# Patient Record
Sex: FEMALE | Race: WHITE | NOT HISPANIC OR LATINO | Employment: OTHER | URBAN - NONMETROPOLITAN AREA
[De-identification: names, ages, dates, MRNs, and addresses within clinical notes are randomized per-mention and may not be internally consistent; named-entity substitution may affect disease eponyms.]

---

## 2017-05-05 ENCOUNTER — ANTICOAGULATION VISIT (OUTPATIENT)
Dept: MEDICAL GROUP | Facility: PHYSICIAN GROUP | Age: 67
End: 2017-05-05
Payer: MEDICARE

## 2017-05-05 DIAGNOSIS — Z79.01 LONG TERM (CURRENT) USE OF ANTICOAGULANTS: ICD-10-CM

## 2017-05-05 PROBLEM — I63.9 STROKE (HCC): Status: ACTIVE | Noted: 2017-05-05

## 2017-05-05 PROBLEM — I48.91 ATRIAL FIBRILLATION (HCC): Status: ACTIVE | Noted: 2017-05-05

## 2017-05-05 LAB — INR PPP: 2.4 (ref 2–3.5)

## 2017-05-05 PROCEDURE — 85610 PROTHROMBIN TIME: CPT | Performed by: PHYSICIAN ASSISTANT

## 2017-05-05 NOTE — MR AVS SNAPSHOT
Tonya Del Valle   2017 9:45 AM   Anticoagulation Visit   MRN: [id number]    Department:  [facility]   Dept Phone:  [phone]    Description:  Female : 1950   Provider:  DANNA ANTI-COAG           Allergies as of 2017     Not on File      Basic Information     Date Of Birth Sex Race Ethnicity Preferred Language    1950 Female White Non- English      Your appointments     May 05, 2017  9:45 AM   Anti-Coag Routine with [place] ANTI-COAG   [facility] ([place])    [address]  [place] [id number] [phone]            2017  9:45 AM   Anti-Coag Routine with [place] ANTI-COAG   [facility] ([place])    [address]  [place] [address] [phone]              Problem List              ICD-10-CM Priority Class Noted - Resolved    Stroke (CMS-HCC) [I63.9] I63.9   2017 - Present      Health Maintenance     Patient has no pending health maintenance at this time      Current Immunizations     No immunizations on file.      Below and/or attached are the medications your provider expects you to take. Review all of your home medications and newly ordered medications with your provider and/or pharmacist. Follow medication instructions as directed by your provider and/or pharmacist. Please keep your medication list with you and share with your provider. Update the information when medications are discontinued, doses are changed, or new medications (including over-the-counter products) are added; and carry medication information at all times in the event of emergency situations     Allergies:  (Not on file)          Medications  Valid as of: May 05, 2017 -  9:43 AM    Generic Name Brand Name Tablet Size Instructions for use    .                 Medicines prescribed today were sent to:     None      Medication refill instructions:       If your prescription bottle indicates you have medication refills left,  it is not necessary to call your provider’s office. Please contact your pharmacy and they will refill your medication.    If your prescription bottle indicates you do not have any refills left, you may request refills at any time through one of the following ways: The online [employer] party supplies & rentals system (except Urgent Care), by calling your provider’s office, or by asking your pharmacy to contact your provider’s office with a refill request. Medication refills are processed only during regular business hours and may not be available until the next business day. Your provider may request additional information or to have a follow-up visit with you prior to refilling your medication.   *Please Note: Medication refills are assigned a new Rx number when refilled electronically. Your pharmacy may indicate that no refills were authorized even though a new prescription for the same medication is available at the pharmacy. Please request the medicine by name with the pharmacy before contacting your provider for a refill.           [employer] Status: Patient Declined

## 2017-05-05 NOTE — PROGRESS NOTES
Anticoagulation Summary as of 5/5/2017     INR goal 2.0-3.0   Selected INR 2.4 (5/5/2017)   Maintenance plan No maintenance plan   Next INR check 6/2/2017   Target end date Indefinite    Indications   Stroke (CMS-[facility]) [I63.9] [I63.9]  Atrial fibrillation (CMS-[facility]) [I48.91] [I48.91]  Long term (current) use of anticoagulants [Z79.01] [Z79.01]         Anticoagulation Episode Summary     INR check location     Preferred lab     Send INR reminders to     Comments INR to MD at [phone]        Anticoagulation Patient Findings      POC INR : 2.4      Plan: Patient is here for POC. She is being dosed by her MD in [place] where she resides. [facility]. Results will be faxed to [phone]    F/u 4 weeks     Jaspal Oh, WILLIED

## 2017-11-03 ENCOUNTER — ANTICOAGULATION VISIT (OUTPATIENT)
Dept: MEDICAL GROUP | Facility: PHYSICIAN GROUP | Age: 67
End: 2017-11-03
Payer: MEDICARE

## 2017-11-03 VITALS — HEART RATE: 80 BPM | DIASTOLIC BLOOD PRESSURE: 80 MMHG | SYSTOLIC BLOOD PRESSURE: 130 MMHG

## 2017-11-03 DIAGNOSIS — Z79.01 LONG TERM CURRENT USE OF ANTICOAGULANT THERAPY: ICD-10-CM

## 2017-11-03 LAB — INR PPP: 2.6 (ref 2–3.5)

## 2017-11-03 PROCEDURE — 85610 PROTHROMBIN TIME: CPT | Performed by: NURSE PRACTITIONER

## 2017-11-03 NOTE — PROGRESS NOTES
Anticoagulation Summary  As of 11/3/2017    INR goal:   2.0-3.0   TTR:   100.0 % (6.1 mo)   Today's INR:   2.6   Maintenance plan:   No maintenance plan   Next INR check:      Target end date:   Indefinite    Indications    Stroke (CMS-[facility]) [I63.9] [I63.9]  Atrial fibrillation (CMS-[facility]) [I48.91] [I48.91]  Long term (current) use of anticoagulants [Z79.01] [Z79.01]             Anticoagulation Episode Summary     INR check location:       Preferred lab:       Send INR reminders to:       Comments:   INR to MD at [phone]  Ghada CONTRERAS         Anticoagulation Patient Findings      HPI:  Tonya Del Valle seen in clinic today, on anticoagulation therapy with warfarin for stroke   Changes to current medical/health status since last appt: INR only and faxed to provider,   Denies signs/symptoms of bleeding and/or thrombosis since the last appt.    Denies any interval changes to diet  Denies any interval changes to medications since last appt.   Denies any complications or cost restrictions with current therapy.   BP recorded in vitals.    A/P   INR  therapeutic.     Follow up appointment prn      Jaspal Oh, PharmD

## 2018-03-30 ENCOUNTER — ANTICOAGULATION VISIT (OUTPATIENT)
Dept: MEDICAL GROUP | Facility: PHYSICIAN GROUP | Age: 68
End: 2018-03-30
Payer: MEDICARE

## 2018-03-30 ENCOUNTER — APPOINTMENT (OUTPATIENT)
Dept: VASCULAR LAB | Facility: MEDICAL CENTER | Age: 68
End: 2018-03-30
Attending: INTERNAL MEDICINE
Payer: MEDICARE

## 2018-03-30 DIAGNOSIS — Z79.01 LONG TERM CURRENT USE OF ANTICOAGULANT THERAPY: ICD-10-CM

## 2018-03-30 DIAGNOSIS — I48.91 ATRIAL FIBRILLATION, UNSPECIFIED TYPE (HCC): ICD-10-CM

## 2018-03-30 LAB — INR PPP: 3.1 (ref 2–3.5)

## 2018-03-30 PROCEDURE — 85610 PROTHROMBIN TIME: CPT | Performed by: NURSE PRACTITIONER

## 2018-03-30 NOTE — PROGRESS NOTES
Anticoagulation Summary  As of 3/30/2018    INR goal:   2.0-3.0   TTR:   --   Today's INR:   3.1!   Maintenance plan:   No maintenance plan   Next INR check:      Target end date:   Indefinite    Indications    Stroke (CMS-[facility]) [I63.9] [I63.9]  Atrial fibrillation (CMS-[facility]) [I48.91] [I48.91]  Long term (current) use of anticoagulants [Z79.01] [Z79.01]             Anticoagulation Episode Summary     INR check location:       Preferred lab:       Send INR reminders to:       Comments:   INR to MD at [phone]  Ghada CONTRERAS         Anticoagulation Patient Findings    INR results forwarded to provider in [place].    Juan R Mirza, PharmD

## 2018-04-27 ENCOUNTER — ANTICOAGULATION VISIT (OUTPATIENT)
Dept: MEDICAL GROUP | Facility: PHYSICIAN GROUP | Age: 68
End: 2018-04-27
Payer: MEDICARE

## 2018-04-27 DIAGNOSIS — Z79.01 LONG TERM CURRENT USE OF ANTICOAGULANT THERAPY: Primary | ICD-10-CM

## 2018-04-27 LAB — INR PPP: 2.4 (ref 2–3.5)

## 2018-04-27 PROCEDURE — 99999 PR NO CHARGE: CPT | Performed by: NURSE PRACTITIONER

## 2018-04-27 PROCEDURE — 85610 PROTHROMBIN TIME: CPT | Performed by: NURSE PRACTITIONER

## 2018-04-27 NOTE — PROGRESS NOTES
Anticoagulation Summary  As of 4/27/2018    INR goal:   2.0-3.0   TTR:   --   Today's INR:   2.4   Warfarin maintenance plan:   No maintenance plan   Next INR check:   5/25/2018   Target end date:   Indefinite    Indications    Stroke (CMS-[facility]) [I63.9] [I63.9]  Atrial fibrillation (CMS-[facility]) [I48.91] [I48.91]  Long term (current) use of anticoagulants [Z79.01] [Z79.01]             Anticoagulation Episode Summary     INR check location:       Preferred lab:       Send INR reminders to:       Comments:   INR to MD at [phone]  Ghada CONTRERAS         Anticoagulation Patient Findings    Pt came into clinic today for INR.   INR was 2.4  INR result was faxed to DIANE as in comments.       Emy Monroe, PharmD

## 2018-05-25 ENCOUNTER — ANTICOAGULATION VISIT (OUTPATIENT)
Dept: MEDICAL GROUP | Facility: PHYSICIAN GROUP | Age: 68
End: 2018-05-25
Payer: MEDICARE

## 2018-05-25 DIAGNOSIS — Z79.01 LONG TERM CURRENT USE OF ANTICOAGULANT THERAPY: ICD-10-CM

## 2018-05-25 LAB — INR PPP: 2.9 (ref 2–3.5)

## 2018-05-25 PROCEDURE — 85610 PROTHROMBIN TIME: CPT | Performed by: NURSE PRACTITIONER

## 2018-05-25 PROCEDURE — 99999 PR NO CHARGE: CPT | Performed by: NURSE PRACTITIONER

## 2018-05-25 NOTE — PROGRESS NOTES
OP Anticoagulation Service Note    Date: 5/25/2018  There were no vitals filed for this visit.    Anticoagulation Summary  As of 5/25/2018    INR goal:   2.0-3.0   TTR:   91.3 % (1.1 y)   Today's INR:   2.9   Warfarin maintenance plan:   No maintenance plan   Next INR check:      Target end date:   Indefinite    Indications    Stroke (CMS-HCC) [I63.9] [I63.9]  Atrial fibrillation (CMS-HCC) [I48.91] [I48.91]  Long term (current) use of anticoagulants [Z79.01] [Z79.01]             Anticoagulation Episode Summary     INR check location:       Preferred lab:       Send INR reminders to:       Comments:   INR to MD at [phone]  Ghada CONTRERAS         Anticoagulation Patient Findings      HPI:   Tonya Del Valle seen in clinic today, they are here today for a INR check on anticoagulation therapy with warfarin because they have a pmh of a stroke    The reason for today's visit is to prevent morbidity and mortality from a stroke  and to reduce the risk of bleeding while on a anticoagulant.     Reason for today's visit (per our collaborative practice policy) is because their last INR was 2.4  on  4/27.   Intervention at the last visit:  none    Any upcoming procedures:   none    Confirmed warfarin dosing regimen  Interval Changes with foods rich in vitamin K: No  Interval Changes in ETOH:   No  Interval Changes in smoking status:  No  Interval Changes in medication:  No   Cost restriction:  No    S/S of bleeding or bruising:  No  Signs/symptoms  thrombosis since the last appt:  No  Bleed risk is:  moderate,     3 vitals included with today's appt :No  (BP, HR, weight, ht, RR)     Assessment:   INR  therapeutic.      No change in dose needed today, they will need to continue with the same dose and diet to prevent adverse events while on a anticoagulant.     They have a TTR of 91.3  which is not at target (TTR target/goal is 100%) and requires close follow up to prevent a adverse event (the lower the TTR the higher risk of  clots, strokes, or bleeding).       Plan:  Dosing per PCP not us.       Follow up:  Follow up appointment in 4- 5 months per pt, they will call us when they get back into town       Other info:  Pt educated to contact our clinic with any changes in medications or s/s of bleeding or thrombosis    CHEST guidelines recommend frequent INR monitoring at regular intervals (a few days up to a max of 12 weeks) to ensure they are on the proper dose of warfarin and not having any complications from therapy.  INRs can dramatically change over a short time period due to diet, medications, and medical conditions.

## 2018-10-18 ENCOUNTER — ANTICOAGULATION MONITORING (OUTPATIENT)
Dept: MEDICAL GROUP | Facility: MEDICAL CENTER | Age: 68
End: 2018-10-18

## 2018-10-18 DIAGNOSIS — Z79.01 LONG TERM (CURRENT) USE OF ANTICOAGULANTS: ICD-10-CM

## 2018-10-18 DIAGNOSIS — I48.91 ATRIAL FIBRILLATION, UNSPECIFIED TYPE (HCC): ICD-10-CM

## 2018-10-18 NOTE — PROGRESS NOTES
Discharged from [facility].  Anticoagulation managed by MD in [facility], Clinical Pharmacist

## 2018-10-19 ENCOUNTER — HOSPITAL ENCOUNTER (OUTPATIENT)
Dept: LAB | Facility: MEDICAL CENTER | Age: 68
End: 2018-10-19
Payer: MEDICARE

## 2018-10-19 ENCOUNTER — ANTICOAGULATION VISIT (OUTPATIENT)
Dept: MEDICAL GROUP | Facility: PHYSICIAN GROUP | Age: 68
End: 2018-10-19
Payer: MEDICARE

## 2018-10-19 DIAGNOSIS — I48.91 ATRIAL FIBRILLATION, UNSPECIFIED TYPE (HCC): ICD-10-CM

## 2018-10-19 DIAGNOSIS — Z79.01 LONG TERM (CURRENT) USE OF ANTICOAGULANTS: ICD-10-CM

## 2018-10-19 LAB — INR PPP: 2.6 (ref 2–3.5)

## 2018-10-19 PROCEDURE — 85610 PROTHROMBIN TIME: CPT | Performed by: NURSE PRACTITIONER

## 2018-10-19 PROCEDURE — 99211 OFF/OP EST MAY X REQ PHY/QHP: CPT | Performed by: NURSE PRACTITIONER

## 2018-10-19 NOTE — PROGRESS NOTES
Anticoagulation Summary  As of 10/19/2018    INR goal:      TTR:   91.3 % (1.1 y)   Today's INR:   2.6   Warfarin maintenance plan:   No maintenance plan   Plan last modified:   Jaspal Oh, PharmD (10/19/2018)   Next INR check:      Target end date:       Indications    Atrial fibrillation (CMS-HCC) [I48.91] [I48.91]  Long term (current) use of anticoagulants [Z79.01] [Z79.01]  Stroke (CMS-HCC) [I63.9] [I63.9]             Anticoagulation Episode Summary     INR check location:       Preferred lab:       Send INR reminders to:       Comments:   INR to MD at [phone]      Anticoagulation Care Providers     Provider Role Specialty Phone number    [facility]   [phone]        Anticoagulation Patient Findings      Tonya is in clinic today for a point-of-care INR only.    Denies signs/symptoms of bleeding and/or thrombosis.   Denies changes to diet or medications.   Follow up appointment in 4 week(s).    Her INR will be sent to the fax number noted above.  Her primary care physician doses of her warfarin    Jaspal Oh, PharmD

## 2018-10-31 ENCOUNTER — ANTICOAGULATION MONITORING (OUTPATIENT)
Dept: VASCULAR LAB | Facility: MEDICAL CENTER | Age: 68
End: 2018-10-31

## 2018-10-31 DIAGNOSIS — I48.91 ATRIAL FIBRILLATION, UNSPECIFIED TYPE (HCC): ICD-10-CM

## 2018-10-31 DIAGNOSIS — Z79.01 LONG TERM (CURRENT) USE OF ANTICOAGULANTS: ICD-10-CM

## 2018-11-16 ENCOUNTER — ANTICOAGULATION VISIT (OUTPATIENT)
Dept: MEDICAL GROUP | Facility: PHYSICIAN GROUP | Age: 68
End: 2018-11-16
Payer: MEDICARE

## 2018-11-16 DIAGNOSIS — Z79.01 LONG TERM (CURRENT) USE OF ANTICOAGULANTS: ICD-10-CM

## 2018-11-16 LAB — INR PPP: 3.2 (ref 2–3.5)

## 2018-11-16 PROCEDURE — 85610 PROTHROMBIN TIME: CPT | Performed by: FAMILY MEDICINE

## 2018-11-16 PROCEDURE — 99211 OFF/OP EST MAY X REQ PHY/QHP: CPT | Performed by: FAMILY MEDICINE

## 2018-11-16 NOTE — PROGRESS NOTES
Anticoagulation Summary  As of 11/16/2018    INR goal:   2.0-3.0   TTR:   49.6 % (2.6 wk)   Today's INR:   3.2!   Warfarin maintenance plan:   No maintenance plan   Plan last modified:   Jaspal Oh, PharmD (10/19/2018)   Next INR check:   4/16/2019   Target end date:       Indications    Atrial fibrillation (CMS-HCC) [I48.91] [I48.91]  Long term (current) use of anticoagulants [Z79.01] [Z79.01]  Stroke (CMS-HCC) [I63.9] [I63.9]             Anticoagulation Episode Summary     INR check location:       Preferred lab:       Send INR reminders to:       Comments:   INR to MD at [phone]      Anticoagulation Care Providers     Provider Role Specialty Phone number    [facility]   [phone]        Anticoagulation Patient Findings      INR  supra-therapeutic.   They are here for INR only.  I will fax this note to their provider at the number noted above. They will be gone until April of next year.  I will not make any follow-up at this point.  They will call me once they get back into [place] to make an appointment with me.    Jaspal Oh, PharmD

## 2018-12-13 ENCOUNTER — ANTICOAGULATION MONITORING (OUTPATIENT)
Dept: VASCULAR LAB | Facility: MEDICAL CENTER | Age: 68
End: 2018-12-13

## 2018-12-13 DIAGNOSIS — I48.91 ATRIAL FIBRILLATION, UNSPECIFIED TYPE (HCC): ICD-10-CM

## 2018-12-13 DIAGNOSIS — Z79.01 LONG TERM (CURRENT) USE OF ANTICOAGULANTS: ICD-10-CM

## 2018-12-13 NOTE — PROGRESS NOTES
Discharged from [facility].  Anticoagulation managed by PCP in [facility], Clinical Pharmacist

## 2020-07-09 ENCOUNTER — TELEPHONE (OUTPATIENT)
Dept: VASCULAR LAB | Facility: MEDICAL CENTER | Age: 70
End: 2020-07-09

## 2020-07-09 ENCOUNTER — HOSPITAL ENCOUNTER (OUTPATIENT)
Dept: LAB | Facility: MEDICAL CENTER | Age: 70
End: 2020-07-09
Payer: MEDICARE

## 2020-07-09 ENCOUNTER — ANTICOAGULATION VISIT (OUTPATIENT)
Dept: MEDICAL GROUP | Facility: PHYSICIAN GROUP | Age: 70
End: 2020-07-09
Payer: MEDICARE

## 2020-07-09 ENCOUNTER — ANTICOAGULATION MONITORING (OUTPATIENT)
Dept: MEDICAL GROUP | Facility: PHYSICIAN GROUP | Age: 70
End: 2020-07-09
Payer: MEDICARE

## 2020-07-09 DIAGNOSIS — Z79.01 LONG TERM (CURRENT) USE OF ANTICOAGULANTS: ICD-10-CM

## 2020-07-09 DIAGNOSIS — I48.91 ATRIAL FIBRILLATION, UNSPECIFIED TYPE (HCC): ICD-10-CM

## 2020-07-09 LAB — INR PPP: 3.3 (ref 2–3.5)

## 2020-07-09 PROCEDURE — 85610 PROTHROMBIN TIME: CPT | Performed by: NURSE PRACTITIONER

## 2020-07-09 PROCEDURE — 99211 OFF/OP EST MAY X REQ PHY/QHP: CPT | Performed by: NURSE PRACTITIONER

## 2020-07-09 NOTE — PROGRESS NOTES
Date: 7/9/2020    Anticoagulation Summary  As of 7/9/2020    INR goal:      TTR:   --   INR used for dosing:   3.30 (7/9/2020)   Warfarin maintenance plan:   No maintenance plan   Next INR check:   8/6/2020   Target end date:       Indications    Atrial fibrillation (CMS-[facility]) [I48.91] [I48.91]  Long term (current) use of anticoagulants [Z79.01] [Z79.01]  Stroke (CMS-[facility]) [I63.9] [I63.9]             Anticoagulation Episode Summary     INR check location:       Preferred lab:       Send INR reminders to:       Comments:   INR to MD at [phone]        Anticoagulation Patient Findings      HPI:   Tonya Del Valle seen in clinic today, they are here today for a INR check on anticoagulation therapy     The reason for today's visit is to prevent morbidity and mortality from a blood clot or stroke and to reduce the risk of bleeding while on a anticoagulant.     Dose the patient in the medical group have a [facility] primary care provider and proper insurance?  Hyacinth Jones, A.P.R.N.  [address] [place] [address] [phone]      Additional education provided today regarding reducing bleed risk and dietary constraints:  About how vitamin K and foods work with warfarin and the bleeding risk on a anticoagulant     Any upcoming procedures:   none    Confirmed warfarin dosing regimen  Interval Changes with foods rich in vitamin K: No  Interval Changes in ETOH:   No  Interval Changes in smoking status:  No  Interval Changes in medication:  No   Cost restriction:  No  S/S of bleeding or bruising:  No  Signs/symptoms  thrombosis since the last appt:  No  Bleed risk is:  moderate,       Assessment:   INR  supra-therapeutic.     Medications reviewed and updated--    3 vitals included with today's appt :  (BP, HR, weight, ht, RR)   There were no vitals filed for this visit.      Plan:  INR to MD     Follow up:  Follow up appointment in 4 week(s)       Other info:  Pt educated to contact our clinic with any changes  in medications or s/s of bleeding or thrombosis    CHEST guidelines recommend frequent INR monitoring at regular intervals (a few days up to a max of 12 weeks) to ensure they are on the proper dose of warfarin and not having any complications from therapy.  INRs can dramatically change over a short time period due to diet, medications, and medical conditions.     Jaspal Oh, PharmD, MS, BCACP, C    This note was created using voice recognition software (Dragon). The accuracy of the dictation is limited by the abilities of the software. I have reviewed the note prior to signing, however some errors in grammar and context are still possible. If you have any questions related to this note please do not hesitate to contact our office.

## 2020-07-09 NOTE — PROGRESS NOTES
Please see prior note, she was booked in the wrong clinic.  She was booked in my clinic not the anticoagulant routine clinic.  By the time I had started the appointment I was unable to change the settings.

## 2020-07-16 ENCOUNTER — OFFICE VISIT (OUTPATIENT)
Dept: MEDICAL GROUP | Facility: PHYSICIAN GROUP | Age: 70
End: 2020-07-16
Payer: MEDICARE

## 2020-07-16 VITALS
SYSTOLIC BLOOD PRESSURE: 136 MMHG | HEIGHT: 66 IN | OXYGEN SATURATION: 96 % | DIASTOLIC BLOOD PRESSURE: 84 MMHG | RESPIRATION RATE: 16 BRPM | TEMPERATURE: 98.7 F | WEIGHT: 220 LBS | HEART RATE: 72 BPM | BODY MASS INDEX: 35.36 KG/M2

## 2020-07-16 DIAGNOSIS — Z12.11 SCREENING FOR COLON CANCER: ICD-10-CM

## 2020-07-16 DIAGNOSIS — I48.91 ATRIAL FIBRILLATION, UNSPECIFIED TYPE (HCC): ICD-10-CM

## 2020-07-16 DIAGNOSIS — I63.9 ISCHEMIC CEREBROVASCULAR ACCIDENT (CVA) (HCC): ICD-10-CM

## 2020-07-16 DIAGNOSIS — Z79.01 LONG TERM (CURRENT) USE OF ANTICOAGULANTS: ICD-10-CM

## 2020-07-16 DIAGNOSIS — N95.1 MENOPAUSAL STATE: ICD-10-CM

## 2020-07-16 DIAGNOSIS — Z23 NEED FOR VACCINATION: ICD-10-CM

## 2020-07-16 DIAGNOSIS — E78.2 MIXED HYPERLIPIDEMIA: ICD-10-CM

## 2020-07-16 DIAGNOSIS — Z12.31 ENCOUNTER FOR SCREENING MAMMOGRAM FOR BREAST CANCER: ICD-10-CM

## 2020-07-16 DIAGNOSIS — I10 ESSENTIAL HYPERTENSION: ICD-10-CM

## 2020-07-16 DIAGNOSIS — Z78.0 POSTMENOPAUSAL STATUS (AGE-RELATED) (NATURAL): ICD-10-CM

## 2020-07-16 PROBLEM — I48.0 PAROXYSMAL ATRIAL FIBRILLATION (HCC): Status: ACTIVE | Noted: 2017-05-05

## 2020-07-16 PROCEDURE — 99214 OFFICE O/P EST MOD 30 MIN: CPT | Performed by: NURSE PRACTITIONER

## 2020-07-16 RX ORDER — DILTIAZEM HYDROCHLORIDE 120 MG/1
120 TABLET, FILM COATED ORAL
COMMUNITY
Start: 2020-03-20 | End: 2020-10-15 | Stop reason: SDUPTHER

## 2020-07-16 RX ORDER — LOSARTAN POTASSIUM 100 MG/1
100 TABLET ORAL
COMMUNITY
Start: 2020-03-20 | End: 2020-07-16

## 2020-07-16 RX ORDER — DILTIAZEM HYDROCHLORIDE 120 MG/1
TABLET, FILM COATED ORAL
COMMUNITY
Start: 2020-06-17 | End: 2020-07-16

## 2020-07-16 RX ORDER — SIMVASTATIN 10 MG
10 TABLET ORAL
COMMUNITY
Start: 2020-03-20 | End: 2020-09-16

## 2020-07-16 RX ORDER — SIMVASTATIN 10 MG
TABLET ORAL
COMMUNITY
Start: 2020-06-17 | End: 2020-07-16

## 2020-07-16 RX ORDER — WARFARIN SODIUM 5 MG/1
TABLET ORAL
COMMUNITY
Start: 2020-03-20 | End: 2020-10-15 | Stop reason: SDUPTHER

## 2020-07-16 RX ORDER — LOSARTAN POTASSIUM 100 MG/1
TABLET ORAL
COMMUNITY
Start: 2020-06-17 | End: 2020-08-16 | Stop reason: SDUPTHER

## 2020-07-16 RX ORDER — CARVEDILOL 25 MG/1
50 TABLET ORAL
COMMUNITY
Start: 2020-05-08 | End: 2020-07-16

## 2020-07-16 RX ORDER — WARFARIN SODIUM 5 MG/1
TABLET ORAL
COMMUNITY
Start: 2020-06-17 | End: 2020-07-16

## 2020-07-16 RX ORDER — CHLORTHALIDONE 25 MG/1
TABLET ORAL
COMMUNITY
Start: 2020-03-20 | End: 2020-10-13 | Stop reason: SDUPTHER

## 2020-07-16 RX ORDER — CLONIDINE HYDROCHLORIDE 0.2 MG/1
TABLET ORAL
COMMUNITY
Start: 2020-05-08 | End: 2020-08-16 | Stop reason: SDUPTHER

## 2020-07-16 RX ORDER — CARVEDILOL 25 MG/1
25 TABLET ORAL 4 TIMES DAILY
COMMUNITY
Start: 2020-06-17 | End: 2020-12-17 | Stop reason: SDUPTHER

## 2020-07-16 RX ORDER — CIPROFLOXACIN 500 MG/1
TABLET, FILM COATED ORAL
COMMUNITY
Start: 2020-05-14 | End: 2020-07-16

## 2020-07-16 RX ORDER — CLONIDINE HYDROCHLORIDE 0.2 MG/1
0.2 TABLET ORAL
COMMUNITY
Start: 2020-05-08 | End: 2020-07-16

## 2020-07-16 RX ORDER — CHLORTHALIDONE 25 MG/1
TABLET ORAL
COMMUNITY
Start: 2020-06-17 | End: 2020-07-16

## 2020-07-16 ASSESSMENT — PATIENT HEALTH QUESTIONNAIRE - PHQ9: CLINICAL INTERPRETATION OF PHQ2 SCORE: 0

## 2020-07-16 NOTE — ASSESSMENT & PLAN NOTE
Chronic and stable  Blood pressure today within normal limits and denies symptoms of hypertension  Does not need refills on medications at this time  Will be due for labs in 6 months, these have been ordered

## 2020-07-16 NOTE — ASSESSMENT & PLAN NOTE
Occurred 11 years ago  Requesting outside record  Still has issues with R leg, ambulates with FWW  No longer followed by neurology  Reports right leg weakness is stable and unchanged  States she will think about physical therapy referral, perhaps sometime in the future but feels this does not need to be addressed at this time

## 2020-07-16 NOTE — LETTER
Critical access hospital  NATALI PadillaRLILLIAN  [address] [place] [address]  Fax: [phone]   Authorization for Release/Disclosure of   Protected Health Information   Name: ANTONIETTA CHRISTENSEN : 1950 SSN: [id number]   Address: [address] [place] [address] Phone:    [phone] (home)    I authorize the entity listed below to release/disclose the PHI below to:   Critical access hospital/MILO Padilla and MILO Padilla   Provider or Entity Name:  [facility]    Address   City, [facility], Zip  [address] [place] [address] Phone:  [phone]    Fax:  [phone]   Reason for request: continuity of care   Information to be released:    [  ] LAST COLONOSCOPY,  including any PATH REPORT and follow-up  [  ] LAST FIT/COLOGUARD RESULT [  ] LAST DEXA  [  ] LAST MAMMOGRAM  [  ] LAST PAP  [  ] LAST LABS [  ] RETINA EXAM REPORT  [  ] IMMUNIZATION RECORDS  [x  ] Release all info      [  ] Check here and initial the line next to each item to release ALL health information INCLUDING  _____ Care and treatment for drug and / or alcohol abuse  _____ HIV testing, infection status, or AIDS  _____ Genetic Testing    DATES OF SERVICE OR TIME PERIOD TO BE DISCLOSED: _____________  I understand and acknowledge that:  * This Authorization may be revoked at any time by you in writing, except if your health information has already been used or disclosed.  * Your health information that will be used or disclosed as a result of you signing this authorization could be re-disclosed by the recipient. If this occurs, your re-disclosed health information may no longer be protected by State or Federal laws.  * You may refuse to sign this Authorization. Your refusal will not affect your ability to obtain treatment.  * This Authorization becomes effective upon signing and will  on (date) __________.      If no date is indicated, this Authorization will  one (1) year from the  signature date.    Name: Tonya Del Valle    Signature:   Date:     7/16/2020       PLEASE FAX REQUESTED RECORDS BACK TO: [phone]

## 2020-07-16 NOTE — PROGRESS NOTES
Chief Complaint   Patient presents with   • Establish Care   • Referral Needed     coumadin clinic          This is a 69 y.o.female patient that presents today with the following: Establish care    Ischemic cerebrovascular accident (CVA) (HCC)  Occurred 11 years ago  Requesting outside record  Still has issues with R leg, ambulates with FWW  No longer followed by neurology  Reports right leg weakness is stable and unchanged  States she will think about physical therapy referral, perhaps sometime in the future but feels this does not need to be addressed at this time    Paroxysmal atrial fibrillation (HCC)  Very pleasant 69-year-old female patient presents today to establish care with new PCP  We will request all outside records  She has paroxysmal atrial fibrillation which resulted in a stroke (see additional notes).  She is on beta-blocker, calcium channel blocker, chlorthalidone and losartan  She does need a referral for a new cardiologist, referral placed  Is asymptomatic of this  Followed by the anticoagulation clinic and on Coumadin    Essential hypertension  Chronic and stable  Blood pressure today within normal limits and denies symptoms of hypertension  Does not need refills on medications at this time  Will be due for labs in 6 months, these have been ordered    Long term (current) use of anticoagulants [Z79.01]  See additional notes    Mixed hyperlipidemia  Chronic and stable  On simvastatin 10 mg daily  She will be due for labs in 6 months  We will request records from outside provider  Discussed the importance of ongoing lifestyle modifications      Anticoagulation Monitoring on 07/09/2020   Component Date Value   • INR 07/09/2020 3.30          clinical course has been stable    No past medical history on file.    No past surgical history on file.    No family history on file.    Atorvastatin and Sulfa drugs    Current Outpatient Medications Ordered in Epic   Medication Sig Dispense Refill   • carvedilol  "(COREG) 25 MG Tab      • chlorthalidone (HYGROTON) 25 MG Tab   See Instructions, TAKE 1 TABLET EVERY MORNING, # 90 tab, 1 Refill(s), Pharmacy: [facility], 165.1, cm, 10/30/19 8:35:00 CDT, Height, 97, kg, 07/09/19 23:54:00 CDT, Weight     • cloNIDine (CATAPRESS) 0.2 MG Tab      • DILTIAZem (CARDIZEM) 120 MG Tab 120 mg.     • warfarin (COUMADIN) 5 MG Tab   See Instructions, 10mg daily, # 180 tab, 1 Refill(s), Pharmacy: [facility], 165.1, cm, 10/30/19 8:35:00 CDT, Height, 97, kg, 07/09/19 23:54:00 CDT, Weight     • losartan (COZAAR) 100 MG Tab      • simvastatin (ZOCOR) 10 MG Tab 10 mg.     • Zoster Vac Recomb Adjuvanted (SHINGRIX) 50 MCG/0.5ML Recon Susp 0.5 mL by Intramuscular route Once for 1 dose. 0.5 mL 0     No current Epic-ordered facility-administered medications on file.        Constitutional ROS: No unexpected change in weight, No weakness, No unexplained fevers, sweats, or chills  Pulmonary ROS: No chronic cough, sputum, or hemoptysis, No shortness of breath, No recent change in breathing  Cardiovascular ROS: Positive per HPI  Gastrointestinal ROS: No abdominal pain, No nausea, vomiting, diarrhea, or constipation  Musculoskeletal/Extremities ROS: Positive for right lower extremity weakness  Neurologic ROS: Normal development, No seizures, No weakness, Positive for past stroke    Physical exam:  /84   Pulse 72   Temp 37.1 °C (98.7 °F) (Temporal)   Resp 16   Ht 1.664 m (5' 5.5\")   Wt 99.8 kg (220 lb)   SpO2 96%   BMI 36.05 kg/m²   General Appearance: Very pleasant elderly female, alert, no distress, obese, well-groomed  Skin: Skin color, texture, turgor normal. No rashes or lesions.  Neck: negative findings: no asymmetry, masses, or scars, no adenopathy, trachea midline and normal to palpitation  Lungs: negative findings: normal respiratory rate and rhythm, lungs clear to auscultation  Heart: positive findings: irregular rhythm  Abdomen: Abdomen soft, " non-tender. BS normal. No masses,  No organomegaly  Musculoskeletal: Positive for impaired gait, ambulating with front wheeled walker secondary to right lower extremity weakness  Peripheral Pulses: Normal  Neurologic: intact, CN II through XII grossly intact    Medical decision making/discussion:     Referral to cardiology    Will obtain outside records    Continue care per Coumadin  Clinic    Mammogram, DEXA scan and cologuard    Follow up 6 months with labs    Tonya was seen today for establish care and referral needed.    Diagnoses and all orders for this visit:    Long term (current) use of anticoagulants [Z79.01]    Atrial fibrillation, unspecified type (HCC)  -     REFERRAL TO CARDIOLOGY Cardiology    Encounter for screening mammogram for breast cancer  -     MA-SCREENING MAMMO BILAT W/CAD; Future    Need for vaccination  -     Zoster Vac Recomb Adjuvanted (SHINGRIX) 50 MCG/0.5ML Recon Susp; 0.5 mL by Intramuscular route Once for 1 dose.    Postmenopausal status (age-related) (natural)  -     DS-BONE DENSITY STUDY (DEXA)    Menopausal state  -     DS-BONE DENSITY STUDY (DEXA)    Screening for colon cancer  -     Cologuard Colon Cancer Screening    Ischemic cerebrovascular accident (CVA) (HCC)    Essential hypertension  -     CBC WITH DIFFERENTIAL; Future  -     Comp Metabolic Panel; Future  -     Lipid Profile; Future    Mixed hyperlipidemia  -     Comp Metabolic Panel; Future  -     Lipid Profile; Future        Return in about 6 months (around 1/16/2021) for Discuss Labs, Follow-up.        Please note that this dictation was created using voice recognition software. I have made every reasonable attempt to correct obvious errors, but I expect that there are errors of grammar and possibly content that I did not discover before finalizing the note.

## 2020-07-16 NOTE — ASSESSMENT & PLAN NOTE
Chronic and stable  On simvastatin 10 mg daily  She will be due for labs in 6 months  We will request records from outside provider  Discussed the importance of ongoing lifestyle modifications

## 2020-07-16 NOTE — PATIENT INSTRUCTIONS
Referral to cardiology    Will obtain outside records    Continue care per Coumadin  Clinic    Mammogram, DEXA scan and cologuard    Follow up 6 months with labs      MyPlate from USDA    MyPlate is an outline of a general healthy diet based on the 2010 Dietary Guidelines for Americans, from the U.S. Department of Agriculture (USDA). It sets guidelines for how much food you should eat from each food group based on your age, sex, and level of physical activity.  What are tips for following MyPlate?  To follow MyPlate recommendations:  · Eat a wide variety of fruits and vegetables, grains, and protein foods.  · Serve smaller portions and eat less food throughout the day.  · Limit portion sizes to avoid overeating.  · Enjoy your food.  · Get at least 150 minutes of exercise every week. This is about 30 minutes each day, 5 or more days per week.  It can be difficult to have every meal look like MyPlate. Think about MyPlate as eating guidelines for an entire day, rather than each individual meal.  Fruits and vegetables  · Make half of your plate fruits and vegetables.  · Eat many different colors of fruits and vegetables each day.  · For a 2,000 calorie daily food plan, eat:  ? 2½ cups of vegetables every day.  ? 2 cups of fruit every day.  · 1 cup is equal to:  ? 1 cup raw or cooked vegetables.  ? 1 cup raw fruit.  ? 1 medium-sized orange, apple, or banana.  ? 1 cup 100% fruit or vegetable juice.  ? 2 cups raw leafy greens, such as lettuce, spinach, or kale.  ? ½ cup dried fruit.  Grains  · One fourth of your plate should be grains.  · Make at least half of the grains you eat each day whole grains.  · For a 2,000 calorie daily food plan, eat 6 oz of grains every day.  · 1 oz is equal to:  ? 1 slice bread.  ? 1 cup cereal.  ? ½ cup cooked rice, cereal, or pasta.  Protein  · One fourth of your plate should be protein.  · Eat a wide variety of protein foods, including meat, poultry, fish, eggs, beans, nuts, and  tofu.  · For a 2,000 calorie daily food plan, eat 5½ oz of protein every day.  · 1 oz is equal to:  ? 1 oz meat, poultry, or fish.  ? ¼ cup cooked beans.  ? 1 egg.  ? ½ oz nuts or seeds.  ? 1 Tbsp peanut butter.  Dairy  · Drink fat-free or low-fat (1%) milk.  · Eat or drink dairy as a side to meals.  · For a 2,000 calorie daily food plan, eat or drink 3 cups of dairy every day.  · 1 cup is equal to:  ? 1 cup milk, yogurt, cottage cheese, or soy milk (soy beverage).  ? 2 oz processed cheese.  ? 1½ oz natural cheese.  Fats, oils, salt, and sugars  · Only small amounts of oils are recommended.  · Avoid foods that are high in calories and low in nutritional value (empty calories), like foods high in fat or added sugars.  · Choose foods that are low in salt (sodium). Choose foods that have less than 140 milligrams (mg) of sodium per serving.  · Drink water instead of sugary drinks. Drink enough water each day to keep your urine pale yellow.  Where to find support  · Work with your health care provider or a nutrition specialist (dietitian) to develop a customized eating plan that is right for you.  · Download an krishna (mobile application) to help you track your daily food intake.  Where to find more information  · Go to ChooseMyPlate.gov for more information.  · Learn more and log your daily food intake according to MyPlate using USDA's SuperTracker: www.supertracker.usda.gov  Summary  · MyPlate is a general guideline for healthy eating from the USDA. It is based on the 2010 Dietary Guidelines for Americans.  · In general, fruits and vegetables should take up ½ of your plate, grains should take up ¼ of your plate, and protein should take up ¼ of your plate.  This information is not intended to replace advice given to you by your health care provider. Make sure you discuss any questions you have with your health care provider.  Document Released: 01/06/2009 Document Revised: 01/19/2019 Document Reviewed: 03/19/2018  [place]  Patient Education © 2020 Elsevier Inc.

## 2020-07-16 NOTE — ASSESSMENT & PLAN NOTE
Very pleasant 69-year-old female patient presents today to establish care with new PCP  We will request all outside records  She has paroxysmal atrial fibrillation which resulted in a stroke (see additional notes).  She is on beta-blocker, calcium channel blocker, chlorthalidone and losartan  She does need a referral for a new cardiologist, referral placed  Is asymptomatic of this  Followed by the anticoagulation clinic and on Coumadin

## 2020-07-21 DIAGNOSIS — I48.0 PAROXYSMAL ATRIAL FIBRILLATION (HCC): ICD-10-CM

## 2020-07-28 ENCOUNTER — ANTICOAGULATION MONITORING (OUTPATIENT)
Dept: VASCULAR LAB | Facility: MEDICAL CENTER | Age: 70
End: 2020-07-28

## 2020-07-28 DIAGNOSIS — I63.9 ISCHEMIC CEREBROVASCULAR ACCIDENT (CVA) (HCC): ICD-10-CM

## 2020-07-28 DIAGNOSIS — I48.0 PAROXYSMAL ATRIAL FIBRILLATION (HCC): ICD-10-CM

## 2020-07-28 DIAGNOSIS — Z79.01 LONG TERM (CURRENT) USE OF ANTICOAGULANTS: ICD-10-CM

## 2020-07-28 LAB
INR PPP: 3.7 (ref 2–3.5)
INR PPP: 3.7 (ref 2–3.5)

## 2020-08-07 ENCOUNTER — ANTICOAGULATION MONITORING (OUTPATIENT)
Dept: MEDICAL GROUP | Facility: PHYSICIAN GROUP | Age: 70
End: 2020-08-07

## 2020-08-07 ENCOUNTER — APPOINTMENT (OUTPATIENT)
Dept: MEDICAL GROUP | Facility: PHYSICIAN GROUP | Age: 70
End: 2020-08-07
Payer: MEDICARE

## 2020-08-07 DIAGNOSIS — I63.9 ISCHEMIC CEREBROVASCULAR ACCIDENT (CVA) (HCC): ICD-10-CM

## 2020-08-07 DIAGNOSIS — Z79.01 LONG TERM (CURRENT) USE OF ANTICOAGULANTS: ICD-10-CM

## 2020-08-07 DIAGNOSIS — I48.0 PAROXYSMAL ATRIAL FIBRILLATION (HCC): ICD-10-CM

## 2020-08-07 NOTE — PROGRESS NOTES
Anticoagulation Summary  As of 8/7/2020    INR goal:  2.0-3.0   TTR:  0.0 % (1.3 wk)   INR used for dosing:  3.70 (7/28/2020)   Warfarin maintenance plan:  15 mg (10 mg x 1.5) every Mon, Fri; 10 mg (10 mg x 1) all other days   Weekly warfarin total:  80 mg   Plan last modified:  Jaspal Oh PharmD (8/7/2020)   Next INR check:  8/10/2020   Target end date:      Indications    Paroxysmal atrial fibrillation (HCC) [I48.0]  Long term (current) use of anticoagulants [Z79.01] [Z79.01]  Ischemic cerebrovascular accident (CVA) (HCC) [I63.9]             Anticoagulation Episode Summary     INR check location:  Anticoagulation Clinic    Preferred lab:      Send INR reminders to:      Comments:        Anticoagulation Care Providers     Provider Role Specialty Phone number    MILO Padilla  [facility] [phone]        Anticoagulation Patient Findings    Spoke to patient on the phone.   INR  supra-therapeutic but INR was from 7/28  Denies signs/symptoms of bleeding and/or thrombosis.   Denies changes to diet or medications.   Follow up appointment in 3 days ).    Continue weekly warfarin dose as noted per pt       Jaspal Oh, Jonel, MS, BCACP, LCC    This note was created using voice recognition software (Dragon). The accuracy of the dictation is limited by the abilities of the software. I have reviewed the note prior to signing, however some errors in grammar and context are still possible. If you have any questions related to this note please do not hesitate to contact our office.

## 2020-08-10 ENCOUNTER — ANTICOAGULATION MONITORING (OUTPATIENT)
Dept: MEDICAL GROUP | Facility: PHYSICIAN GROUP | Age: 70
End: 2020-08-10

## 2020-08-10 DIAGNOSIS — I48.0 PAROXYSMAL ATRIAL FIBRILLATION (HCC): ICD-10-CM

## 2020-08-10 DIAGNOSIS — Z79.01 LONG TERM (CURRENT) USE OF ANTICOAGULANTS: ICD-10-CM

## 2020-08-10 DIAGNOSIS — I63.9 ISCHEMIC CEREBROVASCULAR ACCIDENT (CVA) (HCC): ICD-10-CM

## 2020-08-10 LAB — INR PPP: 3.3 (ref 2–3.5)

## 2020-08-10 NOTE — PROGRESS NOTES
Anticoagulation Summary  As of 8/10/2020    INR goal:  2.0-3.0   TTR:  0.0 % (3.1 wk)   INR used for dosing:  3.30 (8/10/2020)   Warfarin maintenance plan:  10 mg (5 mg x 2) every day   Weekly warfarin total:  70 mg   Plan last modified:  Jaspal Oh PharmD (8/10/2020)   Next INR check:  8/17/2020   Target end date:      Indications    Paroxysmal atrial fibrillation (HCC) [I48.0]  Long term (current) use of anticoagulants [Z79.01] [Z79.01]  Ischemic cerebrovascular accident (CVA) (HCC) [I63.9]             Anticoagulation Episode Summary     INR check location:  Anticoagulation Clinic    Preferred lab:      Send INR reminders to:      Comments:        Anticoagulation Care Providers     Provider Role Specialty Phone number    JO ANN Padilla.  [facility] [phone]        Anticoagulation Patient Findings      Spoke to patient on the phone.   INR  supra-therapeutic.   Denies signs/symptoms of bleeding and/or thrombosis.   Denies changes to diet or medications.   Follow up appointment in 1 week(s).    Decrease weekly warfarin dose as noted      Jaspal hO, PharmD, MS, BCACP, LCC    This note was created using voice recognition software (Dragon). The accuracy of the dictation is limited by the abilities of the software. I have reviewed the note prior to signing, however some errors in grammar and context are still possible. If you have any questions related to this note please do not hesitate to contact our office.

## 2020-08-15 ENCOUNTER — TELEPHONE (OUTPATIENT)
Dept: URGENT CARE | Facility: PHYSICIAN GROUP | Age: 70
End: 2020-08-15

## 2020-08-15 NOTE — TELEPHONE ENCOUNTER
1. Caller Name: Tonya Del Valle                       Call Back Number: [phone]      How would the patient prefer to be contacted with a response: Phone call OK to leave a detailed message    Pt is completely out of   losartan (COZAAR) 100 MG Tab and cloNIDine (CATAPRESS) 0.2 MG Tab would like refill to be sent to [facility] [place] just for this time since she is completely out.

## 2020-08-16 RX ORDER — CLONIDINE HYDROCHLORIDE 0.2 MG/1
0.2 TABLET ORAL 2 TIMES DAILY
Qty: 60 TAB | Refills: 0 | Status: SHIPPED | OUTPATIENT
Start: 2020-08-16 | End: 2020-10-13 | Stop reason: SDUPTHER

## 2020-08-16 RX ORDER — LOSARTAN POTASSIUM 100 MG/1
100 TABLET ORAL DAILY
Qty: 30 TAB | Refills: 0 | Status: SHIPPED | OUTPATIENT
Start: 2020-08-16 | End: 2020-10-15 | Stop reason: SDUPTHER

## 2020-08-17 ENCOUNTER — ANTICOAGULATION MONITORING (OUTPATIENT)
Dept: VASCULAR LAB | Facility: MEDICAL CENTER | Age: 70
End: 2020-08-17

## 2020-08-17 DIAGNOSIS — I63.9 ISCHEMIC CEREBROVASCULAR ACCIDENT (CVA) (HCC): ICD-10-CM

## 2020-08-17 DIAGNOSIS — I48.0 PAROXYSMAL ATRIAL FIBRILLATION (HCC): ICD-10-CM

## 2020-08-17 DIAGNOSIS — Z79.01 LONG TERM (CURRENT) USE OF ANTICOAGULANTS: ICD-10-CM

## 2020-08-17 LAB — INR PPP: 2.8 (ref 2–3.5)

## 2020-08-17 NOTE — PROGRESS NOTES
OP   Telephone Anticoagulation Service Note      Anticoagulation Summary  As of 2020    INR goal:  2.0-3.0   TTR:  9.7 % (4.1 wk)   INR used for dosin.80 (2020)   Warfarin maintenance plan:  10 mg (5 mg x 2) every day   Weekly warfarin total:  70 mg   Plan last modified:  Jaspal Oh PharmD (8/10/2020)   Next INR check:  2020   Target end date:      Indications    Paroxysmal atrial fibrillation (HCC) [I48.0]  Long term (current) use of anticoagulants [Z79.01] [Z79.01]  Ischemic cerebrovascular accident (CVA) (HCC) [I63.9]             Anticoagulation Episode Summary     INR check location:  Anticoagulation Clinic    Preferred lab:      Send INR reminders to:      Comments:        Anticoagulation Care Providers     Provider Role Specialty Phone number    MILO Padilla  [facility] [phone]        Anticoagulation Patient Findings  Patient Findings     Negatives:  Signs/symptoms of thrombosis, Signs/symptoms of bleeding, Laboratory test error suspected, Change in health, Change in alcohol use, Change in activity, Upcoming invasive procedure, Emergency department visit, Upcoming dental procedure, Missed doses, Extra doses, Change in medications, Change in diet/appetite, Hospital admission, Bruising, Other complaints        Spoke with the patient on the phone today, reporting a therapeutic INR of 2.8. Confirmed the current warfarin dosing regimen and patient compliance. Patient denies any interval changes to diet and/or medications. Patient denies any signs/symptoms of bleeding or clotting.  Patient instructed to continue with the current warfarin dosing regimen, and asked to follow up again in 2 weeks with new home monitor.     Lei Callahan PharmD

## 2020-08-18 ENCOUNTER — TELEPHONE (OUTPATIENT)
Dept: MEDICAL GROUP | Facility: PHYSICIAN GROUP | Age: 70
End: 2020-08-18

## 2020-08-18 NOTE — TELEPHONE ENCOUNTER
Patient has an appointment with cardiology tomorrow. I left a VM on her 's cell phone as home phone not working, reminding of the cardiology appointment and to ask for all medication refills. If cardiologist oks, we can continue refills at [facility] in future.   Aj Mejias M.D.

## 2020-08-19 ENCOUNTER — TELEMEDICINE2 (OUTPATIENT)
Dept: CARDIOLOGY | Facility: MEDICAL CENTER | Age: 70
End: 2020-08-19
Payer: MEDICARE

## 2020-08-19 ENCOUNTER — TELEMEDICINE ORIGINATING SITE VISIT (OUTPATIENT)
Dept: MEDICAL GROUP | Facility: PHYSICIAN GROUP | Age: 70
End: 2020-08-19
Payer: MEDICARE

## 2020-08-19 VITALS
SYSTOLIC BLOOD PRESSURE: 138 MMHG | DIASTOLIC BLOOD PRESSURE: 72 MMHG | HEART RATE: 75 BPM | WEIGHT: 218 LBS | TEMPERATURE: 98.6 F | BODY MASS INDEX: 36.32 KG/M2 | OXYGEN SATURATION: 92 % | RESPIRATION RATE: 16 BRPM | HEIGHT: 65 IN

## 2020-08-19 DIAGNOSIS — I48.0 PAROXYSMAL ATRIAL FIBRILLATION (HCC): ICD-10-CM

## 2020-08-19 DIAGNOSIS — E66.9 OBESITY (BMI 30-39.9): Chronic | ICD-10-CM

## 2020-08-19 DIAGNOSIS — Z79.01 LONG TERM (CURRENT) USE OF ANTICOAGULANTS: ICD-10-CM

## 2020-08-19 DIAGNOSIS — Z92.89 HISTORY OF ECHOCARDIOGRAM: Chronic | ICD-10-CM

## 2020-08-19 DIAGNOSIS — E78.2 MIXED HYPERLIPIDEMIA: ICD-10-CM

## 2020-08-19 DIAGNOSIS — I10 ESSENTIAL HYPERTENSION: ICD-10-CM

## 2020-08-19 PROBLEM — I63.9 ISCHEMIC CEREBROVASCULAR ACCIDENT (CVA) (HCC): Chronic | Status: ACTIVE | Noted: 2017-05-05

## 2020-08-19 PROCEDURE — 93000 ELECTROCARDIOGRAM COMPLETE: CPT | Performed by: FAMILY MEDICINE

## 2020-08-19 PROCEDURE — 99204 OFFICE O/P NEW MOD 45 MIN: CPT | Mod: 95,CR | Performed by: INTERNAL MEDICINE

## 2020-08-19 NOTE — PATIENT INSTRUCTIONS
Work on at least 1.5 hours a week of moderate exercise (typical brisk walking or similar activity)    Please look into the following diets and incorporate them into your diet    LOW SALT DIET   KEEP YOUR SODIUM EQUAL TO CALORIES AND NO MORE THAN DOUBLE THE CALORIES FOR A LOW SALT DIET    Cardiosmart.org - great resource for American College of Cardiology on heart disease prevention and treatment      FOR TREATMENT OR PREVENTION OF CORONARY ARTERY DISEASE  These three programs are approved by Medicare/Insurers for those with heart disease  [facility]  Dr. Dud[facility] for Reversing Heart Disease - [facility]'s Cardiologist vegetarian-based  [facility] - [place]-based mind-body Program    This is a commonly referenced Program  Dr Hairston - [place] [facility] [place] (book and documentary) - vegetarian-based      FOR TREATMENT OF BLOOD PRESSURE  DASH DIET - American Heart Association for treatment of HYPERTENSION    FOR TREATMENT OF BAD CHOLESTEROL/FATS  REDUCE PROCESSED SUGAR AS MUCH AS POSSIBLE  INCREASE WHOLE GRAINS/VEGETABLES    Lowering total cholesterol and LDL (bad) cholesterol:  - Eat leaner cuts of meat, or eliminate altogether if possible red meat, and frequently substitute fish or chicken.  - Limit saturated fat to no more than 7-10% of total calories no more than 10 g per day is recommended. Some sources of saturated fat include butter, animal fats, hydrogenated vegetable fats and oils, many desserts, whole milk dairy products.  - Replaced saturated fats with polyunsaturated fats and monounsaturated fats. Foods high in monounsaturated fat include nuts, although well, canola oil, avocados, and olives.  - Limit trans fat (processed foods) and replaced with fresh fruits and vegetables  - Recommend nonfat dairy products  - Increase substantially the amount of soluble fiber intake (legumes such as beans, fruit, whole grains).  - Consider  nutritional supplements: plant sterile spreads such as Benecol, fish oil,  flaxseed oil, omega-3 acids capsules 1000 mg twice a day, or viscous fiber such as Metamucil  - Attain ideal weight and regular exercise (at least 30 minutes per day of walking)    Lowering triglycerides:  - Reduce intake of simple sugar: Desserts, candy, pastries, honey, sodas, sugared cereals, yogurt, Gatorade, sports bars, canned fruit, smoothies, fruit juice, coffee drinks  - Reduced intake of refined starches: Refined Pasta  - Reduce or abstain from alcohol  - Increase omega-3 fatty acids: [place], Trout, Mackerel, Herring, Albacore tuna and supplements  - Attain ideal weight and regular exercise (at least 30 minutes per day of walking)      Elevating HDL (good) cholesterol:  - Increase physical activity  - Seasoned foods with garlic and onions  - Increase omega-3 fatty acids and supplements as listed above  - Incorporating appropriate amounts of monounsaturated fats such as nuts, olive oil, canola oil, avocados, olives  - Stop smoking  - Attain ideal weight and regular exercise (at least 30 minutes per day of walking)

## 2020-08-19 NOTE — LETTER
[facility]   [address] [place] [address]  [place] [address]  Phone: [phone]  Fax: [phone]              Tonya Del Valle  1950    Encounter Date: 8/19/2020    Chuckie Malik M.D.          PROGRESS NOTE:  No chief complaint on file.      Subjective:   Tonya Del Valle is a 69 y.o. female who presents today in consultation from MILO Padilla  For evaluation of her cardiac history she has a remote history of stroke was found to have A. fib at that time is been on long-term anticoagulation and rate control she had bloodstream infection last year and had an echocardiogram in that setting shows normal function of the heart with paroxysmal A. fib she has had intervening EKGs over the years and no further signs for A. fib although she does have documented A. fib from her stroke EKGs sent from her primary care office she did follow regularly with cardiology and is tolerating medications well she feels most comfortable on Coumadin and has done well without long-term.  The time of her stroke she is started on clonidine is tolerating that well blood pressures have been good I believe she also reported a normal sleep study she is struggled long-term with obesity    Past Medical History:   Diagnosis Date   • History of echocardiogram - 7/2019 normal    • Ischemic cerebrovascular accident (CVA) ([facility]) 5/5/2017   • Obesity (BMI 30-39.9)      Past Surgical History:   Procedure Laterality Date   • OTHER      artery repair in 1970 related to childbirth     Family History   Problem Relation Age of Onset   • Heart Disease Mother      Social History     Socioeconomic History   • Marital status:      Spouse name: Not on file   • Number of children: Not on file   • Years of education: Not on file   • Highest education level: Not on file   Occupational History   • Not on file   Social Needs   • Financial resource strain: Not on file   • Food insecurity    Worry: Not on file     Inability: Not on file   • Transportation needs     Medical: Not on file     Non-medical: Not on file   Tobacco Use   • Smoking status: Never Smoker   • Smokeless tobacco: Never Used   Substance and Sexual Activity   • Alcohol use: Not Currently   • Drug use: Not Currently   • Sexual activity: Yes     Partners: Male     Comment:     Lifestyle   • Physical activity     Days per week: Not on file     Minutes per session: Not on file   • Stress: Not on file   Relationships   • Social connections     Talks on phone: Not on file     Gets together: Not on file     Attends Alevism service: Not on file     Active member of club or organization: Not on file     Attends meetings of clubs or organizations: Not on file     Relationship status: Not on file   • Intimate partner violence     Fear of current or ex partner: Not on file     Emotionally abused: Not on file     Physically abused: Not on file     Forced sexual activity: Not on file   Other Topics Concern   • Not on file   Social History Narrative   • Not on file     Allergies   Allergen Reactions   • Sulfa Drugs Rash     Outpatient Encounter Medications as of 8/19/2020   Medication Sig Dispense Refill   • cloNIDine (CATAPRES) 0.2 MG Tab Take 1 Tab by mouth 2 times a day. 60 Tab 0   • losartan (COZAAR) 100 MG Tab Take 1 Tab by mouth every day. 30 Tab 0   • carvedilol (COREG) 25 MG Tab      • chlorthalidone (HYGROTON) 25 MG Tab   See Instructions, TAKE 1 TABLET EVERY MORNING, # 90 tab, 1 Refill(s), Pharmacy: [facility], 165.1, cm, 10/30/19 8:35:00 CDT, Height, 97, kg, 07/09/19 23:54:00 CDT, Weight     • DILTIAZem (CARDIZEM) 120 MG Tab 120 mg.     • warfarin (COUMADIN) 5 MG Tab   See Instructions, 10mg daily, # 180 tab, 1 Refill(s), Pharmacy: [facility], 165.1, cm, 10/30/19 8:35:00 CDT, Height, 97, kg, 07/09/19 23:54:00 CDT, Weight     • simvastatin (ZOCOR) 10 MG Tab 10 mg.       No  "facility-administered encounter medications on file as of 8/19/2020.      Review of Systems   Constitutional: Negative for chills and fever.   HENT: Negative for sore throat.    Eyes: Negative for blurred vision.   Respiratory: Negative for cough and shortness of breath.    Cardiovascular: Negative for chest pain, palpitations, claudication, leg swelling and PND.   Gastrointestinal: Negative for abdominal pain and nausea.   Musculoskeletal: Negative for falls and joint pain.   Skin: Negative for rash.   Neurological: Negative for dizziness, focal weakness and weakness.   Endo/Heme/Allergies: Does not bruise/bleed easily.        Objective:   /72 (BP Location: Left arm, Patient Position: Sitting, BP Cuff Size: Adult)   Pulse 75   Temp 37 °C (98.6 °F) (Temporal)   Resp 16   Ht 1.651 m (5' 5\")   Wt 98.9 kg (218 lb)   SpO2 92%   BMI 36.28 kg/m²     Physical Exam   Constitutional: She appears well-developed. No distress.   HENT:   Wearing a mask due to COVID precautions   Eyes: No scleral icterus.   Cardiovascular: Normal rate and regular rhythm.   No murmur heard.  Pulmonary/Chest: Effort normal.   Abdominal:   Obesity   Musculoskeletal:         General: No edema.   Neurological: She is alert.   Psychiatric: She has a normal mood and affect.      We reviewed her records from [place] echocardiogram last year was normal EKGs over the years of normal aside from the initial one at the time of her stroke showed A. fib with RVR    We reviewed in person the most recent labs  Recent Results (from the past 4032 hour(s))   POCT Protime    Collection Time: 07/09/20 10:04 AM   Result Value Ref Range    INR 3.30    Prothrombin Time    Collection Time: 07/28/20 12:00 AM   Result Value Ref Range    INR 3.70    Prothrombin Time    Collection Time: 07/28/20 12:00 AM   Result Value Ref Range    INR 3.70    Prothrombin Time    Collection Time: 08/10/20 12:00 AM   Result Value Ref Range    INR 3.30    Prothrombin Time    " Collection Time: 08/17/20 12:00 AM   Result Value Ref Range    INR 2.80          Patient was presented for a telehealth consultation via secure and encrypted videoconferencing technology.   Assessment:     1. Long term (current) use of anticoagulants [Z79.01]     2. Mixed hyperlipidemia     3. Paroxysmal atrial fibrillation (HCC)     4. Essential hypertension     5. History of echocardiogram - 7/2019 normal     6. Obesity (BMI 30-39.9)         Medical Decision Making:  Today's Assessment / Status / Plan:     It was my pleasure to meet with Ms. Del Valle.    Blood pressure is well controlled.  We specifically assessed the labs on hypertension treatment    She is on appropriate statin.    She understands the risks and benefits of chronic anticoagulation.  We reviewed and verified the results of annual testing for anemia and kidney function.    I will see Ms. Del Valle back in 1 year time and encouraged her to follow up with us over the phone or electronically using my MyChart as issues arise.    It is my pleasure to participate in the care of Ms. Del Valle.  Please do not hesitate to contact me with questions or concerns.    Chuckie Malik MD PhD [facility]  Cardiologist [facility]    Please note that this dictation was created using voice recognition software. There may be errors I did not discover before finalizing the note.         Hyacinth Jones, A.P.R.N.  [address] [place] [address]  Via In Basket

## 2020-08-20 ASSESSMENT — ENCOUNTER SYMPTOMS
FOCAL WEAKNESS: 0
DIZZINESS: 0
SORE THROAT: 0
NAUSEA: 0
ABDOMINAL PAIN: 0
SHORTNESS OF BREATH: 0
BLURRED VISION: 0
COUGH: 0
CLAUDICATION: 0
CHILLS: 0
WEAKNESS: 0
FALLS: 0
FEVER: 0
PALPITATIONS: 0
PND: 0
BRUISES/BLEEDS EASILY: 0

## 2020-08-20 NOTE — PROGRESS NOTES
No chief complaint on file.      Subjective:   Tonya Del Valle is a 69 y.o. female who presents today in consultation from MILO Padilla  For evaluation of her cardiac history she has a remote history of stroke was found to have A. fib at that time is been on long-term anticoagulation and rate control she had bloodstream infection last year and had an echocardiogram in that setting shows normal function of the heart with paroxysmal A. fib she has had intervening EKGs over the years and no further signs for A. fib although she does have documented A. fib from her stroke EKGs sent from her primary care office she did follow regularly with cardiology and is tolerating medications well she feels most comfortable on Coumadin and has done well without long-term.  The time of her stroke she is started on clonidine is tolerating that well blood pressures have been good I believe she also reported a normal sleep study she is struggled long-term with obesity    Past Medical History:   Diagnosis Date   • History of echocardiogram - 7/2019 normal    • Ischemic cerebrovascular accident (CVA) ([facility]) 5/5/2017   • Obesity (BMI 30-39.9)      Past Surgical History:   Procedure Laterality Date   • OTHER      artery repair in 1970 related to childbirth     Family History   Problem Relation Age of Onset   • Heart Disease Mother      Social History     Socioeconomic History   • Marital status:      Spouse name: Not on file   • Number of children: Not on file   • Years of education: Not on file   • Highest education level: Not on file   Occupational History   • Not on file   Social Needs   • Financial resource strain: Not on file   • Food insecurity     Worry: Not on file     Inability: Not on file   • Transportation needs     Medical: Not on file     Non-medical: Not on file   Tobacco Use   • Smoking status: Never Smoker   • Smokeless tobacco: Never Used   Substance and Sexual Activity   • Alcohol use: Not Currently   •  Drug use: Not Currently   • Sexual activity: Yes     Partners: Male     Comment:     Lifestyle   • Physical activity     Days per week: Not on file     Minutes per session: Not on file   • Stress: Not on file   Relationships   • Social connections     Talks on phone: Not on file     Gets together: Not on file     Attends Spiritism service: Not on file     Active member of club or organization: Not on file     Attends meetings of clubs or organizations: Not on file     Relationship status: Not on file   • Intimate partner violence     Fear of current or ex partner: Not on file     Emotionally abused: Not on file     Physically abused: Not on file     Forced sexual activity: Not on file   Other Topics Concern   • Not on file   Social History Narrative   • Not on file     Allergies   Allergen Reactions   • Sulfa Drugs Rash     Outpatient Encounter Medications as of 8/19/2020   Medication Sig Dispense Refill   • cloNIDine (CATAPRES) 0.2 MG Tab Take 1 Tab by mouth 2 times a day. 60 Tab 0   • losartan (COZAAR) 100 MG Tab Take 1 Tab by mouth every day. 30 Tab 0   • carvedilol (COREG) 25 MG Tab      • chlorthalidone (HYGROTON) 25 MG Tab   See Instructions, TAKE 1 TABLET EVERY MORNING, # 90 tab, 1 Refill(s), Pharmacy: [facility], 165.1, cm, 10/30/19 8:35:00 CDT, Height, 97, kg, 07/09/19 23:54:00 CDT, Weight     • DILTIAZem (CARDIZEM) 120 MG Tab 120 mg.     • warfarin (COUMADIN) 5 MG Tab   See Instructions, 10mg daily, # 180 tab, 1 Refill(s), Pharmacy: [facility], 165.1, cm, 10/30/19 8:35:00 CDT, Height, 97, kg, 07/09/19 23:54:00 CDT, Weight     • simvastatin (ZOCOR) 10 MG Tab 10 mg.       No facility-administered encounter medications on file as of 8/19/2020.      Review of Systems   Constitutional: Negative for chills and fever.   HENT: Negative for sore throat.    Eyes: Negative for blurred vision.   Respiratory: Negative for cough and shortness of breath.   "  Cardiovascular: Negative for chest pain, palpitations, claudication, leg swelling and PND.   Gastrointestinal: Negative for abdominal pain and nausea.   Musculoskeletal: Negative for falls and joint pain.   Skin: Negative for rash.   Neurological: Negative for dizziness, focal weakness and weakness.   Endo/Heme/Allergies: Does not bruise/bleed easily.        Objective:   /72 (BP Location: Left arm, Patient Position: Sitting, BP Cuff Size: Adult)   Pulse 75   Temp 37 °C (98.6 °F) (Temporal)   Resp 16   Ht 1.651 m (5' 5\")   Wt 98.9 kg (218 lb)   SpO2 92%   BMI 36.28 kg/m²     Physical Exam   Constitutional: She appears well-developed. No distress.   HENT:   Wearing a mask due to COVID precautions   Eyes: No scleral icterus.   Cardiovascular: Normal rate and regular rhythm.   No murmur heard.  Pulmonary/Chest: Effort normal.   Abdominal:   Obesity   Musculoskeletal:         General: No edema.   Neurological: She is alert.   Psychiatric: She has a normal mood and affect.      We reviewed her records from [place] echocardiogram last year was normal EKGs over the years of normal aside from the initial one at the time of her stroke showed A. fib with RVR    We reviewed in person the most recent labs  Recent Results (from the past 4032 hour(s))   POCT Protime    Collection Time: 07/09/20 10:04 AM   Result Value Ref Range    INR 3.30    Prothrombin Time    Collection Time: 07/28/20 12:00 AM   Result Value Ref Range    INR 3.70    Prothrombin Time    Collection Time: 07/28/20 12:00 AM   Result Value Ref Range    INR 3.70    Prothrombin Time    Collection Time: 08/10/20 12:00 AM   Result Value Ref Range    INR 3.30    Prothrombin Time    Collection Time: 08/17/20 12:00 AM   Result Value Ref Range    INR 2.80          Patient was presented for a telehealth consultation via secure and encrypted videoconferencing technology, she was presented with a remote presenter who did vital signs and digital stethoscope as " able. This evaluation was conducted via  using secure and encrypted videoconferencing technology.   The patient's identity was confirmed and verbal consent was obtained for this telemedicine encounter.    Exam  Gen appears well  HEENT wearing mask due to covid precautions  Neck no apparent JVD  CV RRR by digital stethoscope  Resp no rales per digital stethoscope   Ext no apparent edema      Patient was presented for a telehealth consultation via secure and encrypted videoconferencing technology, she was presented with a remote presenter who did vital signs and digital stethoscope as able.  Tonya Del Valle's identity was confirmed and verbal consent was obtained for this telemedicine encounter.    Assessment:     1. Long term (current) use of anticoagulants [Z79.01]     2. Mixed hyperlipidemia     3. Paroxysmal atrial fibrillation (HCC)     4. Essential hypertension     5. History of echocardiogram - 7/2019 normal     6. Obesity (BMI 30-39.9)         Medical Decision Making:  Today's Assessment / Status / Plan:     It was my pleasure to meet with Ms. Del Valle.    Blood pressure is well controlled.  We specifically assessed the labs on hypertension treatment    She is on appropriate statin.    She understands the risks and benefits of chronic anticoagulation.  We reviewed and verified the results of annual testing for anemia and kidney function.    I will see Ms. Del Valle back in 1 year time and encouraged her to follow up with us over the phone or electronically using my MyChart as issues arise.    It is my pleasure to participate in the care of Ms. Del Valle.  Please do not hesitate to contact me with questions or concerns.    Chuckie Malik MD PhD [facility]  Cardiologist [facility]    Please note that this dictation was created using voice recognition software. There may be errors I did not discover before finalizing the note.

## 2020-08-31 ENCOUNTER — ANTICOAGULATION MONITORING (OUTPATIENT)
Dept: MEDICAL GROUP | Facility: PHYSICIAN GROUP | Age: 70
End: 2020-08-31

## 2020-08-31 DIAGNOSIS — Z79.01 LONG TERM (CURRENT) USE OF ANTICOAGULANTS: ICD-10-CM

## 2020-08-31 DIAGNOSIS — I48.0 PAROXYSMAL ATRIAL FIBRILLATION (HCC): ICD-10-CM

## 2020-08-31 DIAGNOSIS — I63.9 ISCHEMIC CEREBROVASCULAR ACCIDENT (CVA) (HCC): ICD-10-CM

## 2020-08-31 LAB — INR PPP: 2.7 (ref 2–3.5)

## 2020-08-31 NOTE — PROGRESS NOTES
OP   Telephone Anticoagulation Service Note      Anticoagulation Summary  As of 2020    INR goal:  2.0-3.0   TTR:  39.1 % (1.4 mo)   INR used for dosin.70 (2020)   Warfarin maintenance plan:  10 mg (5 mg x 2) every day   Weekly warfarin total:  70 mg   Plan last modified:  Jaspal Oh PharmD (8/10/2020)   Next INR check:  2020   Target end date:      Indications    Paroxysmal atrial fibrillation (HCC) [I48.0]  Long term (current) use of anticoagulants [Z79.01] [Z79.01]  Ischemic cerebrovascular accident (CVA) (HCC) [I63.9]             Anticoagulation Episode Summary     INR check location:  Anticoagulation Clinic    Preferred lab:      Send INR reminders to:      Comments:        Anticoagulation Care Providers     Provider Role Specialty Phone number    MILO Padilla  [facility] [phone]        Anticoagulation Patient Findings    Left a message on the patient's voicemail today, informing the patient of a therapeutic INR of 2.7. Instructed the patient to call the clinic with any changes to diet or medications, with any signs/sx of bleeding or clotting or with any questions or concerns.     Patient instructed to continue with the current warfarin dosing regimen, and asked to follow up again in 2 weeks.     Lei Callahan PharmD

## 2020-09-15 ENCOUNTER — ANTICOAGULATION MONITORING (OUTPATIENT)
Dept: VASCULAR LAB | Facility: MEDICAL CENTER | Age: 70
End: 2020-09-15

## 2020-09-15 DIAGNOSIS — I48.0 PAROXYSMAL ATRIAL FIBRILLATION (HCC): ICD-10-CM

## 2020-09-15 DIAGNOSIS — Z79.01 LONG TERM (CURRENT) USE OF ANTICOAGULANTS: ICD-10-CM

## 2020-09-15 DIAGNOSIS — I63.9 ISCHEMIC CEREBROVASCULAR ACCIDENT (CVA) (HCC): ICD-10-CM

## 2020-09-15 LAB — INR PPP: 2.4 (ref 2–3.5)

## 2020-09-15 NOTE — PROGRESS NOTES
OP Telephone Anticoagulation Service Note    Date: 9/15/2020      Anticoagulation Summary  As of 9/15/2020    INR goal:  2.0-3.0   TTR:  54.8 % (1.9 mo)   INR used for dosin.40 (9/15/2020)   Warfarin maintenance plan:  10 mg (5 mg x 2) every day   Weekly warfarin total:  70 mg   Plan last modified:  Jaspal Oh, PharmD (8/10/2020)   Next INR check:  2020   Target end date:      Indications    Paroxysmal atrial fibrillation (HCC) [I48.0]  Long term (current) use of anticoagulants [Z79.01] [Z79.01]  Ischemic cerebrovascular accident (CVA) (HCC) [I63.9]             Anticoagulation Episode Summary     INR check location:  Anticoagulation Clinic    Preferred lab:      Send INR reminders to:      Comments:        Anticoagulation Care Providers     Provider Role Specialty Phone number    MILO Padilla  [facility] [phone]        Anticoagulation Patient Findings      INR therapeutic at 2.4.  Spoke w/ pt's  on phone.  Verified regimen.  Instructed that pt is to continue on with current regimen.  NO s/s bleeding reported.  NO changes in diet reported.  NO changes in medications reported.  Check INR in 2 week(s).  Instructed that pt is to call clinic at [phone] if there are any questions.  Allen stated understanding.    Renny Christina, PharmD

## 2020-09-29 ENCOUNTER — ANTICOAGULATION MONITORING (OUTPATIENT)
Dept: VASCULAR LAB | Facility: MEDICAL CENTER | Age: 70
End: 2020-09-29

## 2020-09-29 DIAGNOSIS — I63.9 ISCHEMIC CEREBROVASCULAR ACCIDENT (CVA) (HCC): ICD-10-CM

## 2020-09-29 DIAGNOSIS — I48.0 PAROXYSMAL ATRIAL FIBRILLATION (HCC): ICD-10-CM

## 2020-09-29 DIAGNOSIS — Z79.01 LONG TERM (CURRENT) USE OF ANTICOAGULANTS: ICD-10-CM

## 2020-09-29 LAB — INR PPP: 2.6 (ref 2–3.5)

## 2020-09-30 NOTE — PROGRESS NOTES
Anticoagulation Summary  As of 2020    INR goal:  2.0-3.0   TTR:  63.6 % (2.4 mo)   INR used for dosin.60 (2020)   Warfarin maintenance plan:  10 mg (5 mg x 2) every day   Weekly warfarin total:  70 mg   Plan last modified:  Jaspal Oh PharmD (8/10/2020)   Next INR check:  10/13/2020   Target end date:  Indefinite    Indications    Paroxysmal atrial fibrillation (HCC) [I48.0]  Long term (current) use of anticoagulants [Z79.01] [Z79.01]  Ischemic cerebrovascular accident (CVA) (HCC) [I63.9]             Anticoagulation Episode Summary     INR check location:  Anticoagulation Clinic    Preferred lab:      Send INR reminders to:      Comments:        Anticoagulation Care Providers     Provider Role Specialty Phone number    MILO Padilla  [facility] [phone]        Anticoagulation Patient Findings      Spoke with patient to report a therapeutic INR.    Pt instructed to continue with current warfarin dosing regimen, confirms dosing.   Pt denies any s/s of bleeding, bruising, clotting or any changes to diet or medication.    Will follow up in 2 week(s).     Jolanta Landon, PharmD

## 2020-10-13 RX ORDER — CHLORTHALIDONE 25 MG/1
TABLET ORAL
Qty: 90 TAB | Refills: 0 | Status: SHIPPED | OUTPATIENT
Start: 2020-10-13 | End: 2020-12-23

## 2020-10-13 RX ORDER — CLONIDINE HYDROCHLORIDE 0.2 MG/1
0.2 TABLET ORAL 2 TIMES DAILY
Qty: 180 TAB | Refills: 0 | Status: SHIPPED | OUTPATIENT
Start: 2020-10-13 | End: 2020-12-23

## 2020-10-14 ENCOUNTER — ANTICOAGULATION MONITORING (OUTPATIENT)
Dept: VASCULAR LAB | Facility: MEDICAL CENTER | Age: 70
End: 2020-10-14

## 2020-10-14 DIAGNOSIS — I63.9 ISCHEMIC CEREBROVASCULAR ACCIDENT (CVA) (HCC): ICD-10-CM

## 2020-10-14 DIAGNOSIS — I48.0 PAROXYSMAL ATRIAL FIBRILLATION (HCC): ICD-10-CM

## 2020-10-14 DIAGNOSIS — Z79.01 LONG TERM (CURRENT) USE OF ANTICOAGULANTS: ICD-10-CM

## 2020-10-14 LAB — INR PPP: 2.9 (ref 2–3.5)

## 2020-10-15 DIAGNOSIS — Z79.01 CHRONIC ANTICOAGULATION: ICD-10-CM

## 2020-10-15 RX ORDER — WARFARIN SODIUM 5 MG/1
TABLET ORAL
Qty: 180 TAB | Refills: 1 | Status: SHIPPED | OUTPATIENT
Start: 2020-10-15 | End: 2021-02-12 | Stop reason: SDUPTHER

## 2020-10-15 RX ORDER — LOSARTAN POTASSIUM 100 MG/1
100 TABLET ORAL DAILY
Qty: 90 TAB | Refills: 3 | Status: SHIPPED | OUTPATIENT
Start: 2020-10-15 | End: 2021-09-10

## 2020-10-15 RX ORDER — WARFARIN SODIUM 5 MG/1
TABLET ORAL
Qty: 30 TAB | OUTPATIENT
Start: 2020-10-15

## 2020-10-15 RX ORDER — DILTIAZEM HYDROCHLORIDE 120 MG/1
120 TABLET, FILM COATED ORAL DAILY
Qty: 90 TAB | Refills: 3 | Status: SHIPPED | OUTPATIENT
Start: 2020-10-15 | End: 2020-12-17 | Stop reason: SDUPTHER

## 2020-10-15 NOTE — PROGRESS NOTES
Anticoagulation Summary  As of 10/14/2020    INR goal:  2.0-3.0   TTR:  69.9 % (2.9 mo)   INR used for dosin.90 (10/14/2020)   Warfarin maintenance plan:  10 mg (5 mg x 2) every day   Weekly warfarin total:  70 mg   Plan last modified:  Jaspal Oh, PharmD (8/10/2020)   Next INR check:  10/28/2020   Target end date:  Indefinite    Indications    Paroxysmal atrial fibrillation (HCC) [I48.0]  Long term (current) use of anticoagulants [Z79.01] [Z79.01]  Ischemic cerebrovascular accident (CVA) (HCC) [I63.9]             Anticoagulation Episode Summary     INR check location:  Anticoagulation Clinic    Preferred lab:      Send INR reminders to:      Comments:        Anticoagulation Care Providers     Provider Role Specialty Phone number    MILO Padilla  [facility] [phone]        Anticoagulation Patient Findings    Spoke with patient's  by phone. Patient is therapeutic. Denies any medication or diet changes. No current symptoms of bleeding or thrombosis reported. Continue current regimen. Follow up in 2 weeks.    Hyacinth CONTRERAS

## 2020-10-22 ENCOUNTER — NON-PROVIDER VISIT (OUTPATIENT)
Dept: MEDICAL GROUP | Facility: PHYSICIAN GROUP | Age: 70
End: 2020-10-22
Payer: MEDICARE

## 2020-10-22 DIAGNOSIS — Z23 NEED FOR INFLUENZA VACCINATION: ICD-10-CM

## 2020-10-22 PROCEDURE — G0008 ADMIN INFLUENZA VIRUS VAC: HCPCS | Performed by: NURSE PRACTITIONER

## 2020-10-22 PROCEDURE — 90662 IIV NO PRSV INCREASED AG IM: CPT | Performed by: NURSE PRACTITIONER

## 2020-10-23 NOTE — NON-PROVIDER
"Tonya Del Valle is a 69 y.o. female here for a non-provider visit for:   FLU    Reason for immunization: Annual Flu Vaccine  Immunization records indicate need for vaccine: Yes, confirmed with Epic  Minimum interval has been met for this vaccine: Yes  ABN completed: Yes    Order and dose verified by: Jennie OLIVA Dated   was given to patient: yes  All IAC Questionnaire questions were answered \"No.\"    Patient tolerated injection and no adverse effects were observed or reported: Yes    Pt scheduled for next dose in series: Not Indicated  "

## 2020-10-28 ENCOUNTER — ANTICOAGULATION MONITORING (OUTPATIENT)
Dept: VASCULAR LAB | Facility: MEDICAL CENTER | Age: 70
End: 2020-10-28

## 2020-10-28 DIAGNOSIS — I48.0 PAROXYSMAL ATRIAL FIBRILLATION (HCC): ICD-10-CM

## 2020-10-28 DIAGNOSIS — Z79.01 LONG TERM (CURRENT) USE OF ANTICOAGULANTS: ICD-10-CM

## 2020-10-28 DIAGNOSIS — I63.9 ISCHEMIC CEREBROVASCULAR ACCIDENT (CVA) (HCC): ICD-10-CM

## 2020-10-28 LAB — INR PPP: 3.5 (ref 2–3.5)

## 2020-10-28 NOTE — PROGRESS NOTES
Anticoagulation Summary  As of 10/28/2020    INR goal:  2.0-3.0   TTR:  62.5 % (3.4 mo)   INR used for dosing:  3.50 (10/28/2020)   Warfarin maintenance plan:  10 mg (5 mg x 2) every day   Weekly warfarin total:  70 mg   Plan last modified:  Jaspal hO, PharmD (8/10/2020)   Next INR check:  11/4/2020   Target end date:  Indefinite    Indications    Paroxysmal atrial fibrillation (HCC) [I48.0]  Long term (current) use of anticoagulants [Z79.01] [Z79.01]  Ischemic cerebrovascular accident (CVA) (HCC) [I63.9]             Anticoagulation Episode Summary     INR check location:  Anticoagulation Clinic    Preferred lab:      Send INR reminders to:      Comments:        Anticoagulation Care Providers     Provider Role Specialty Phone number    MILO Padilla  [facility] [phone]        Anticoagulation Patient Findings     Left voicemail message to report a supra therapeutic INR of 3.5.  Instructed pt to to decrease dose tonight only to 5 mg then continue with current warfarin dosing regimen. Requested pt contact the clinic for any s/s of unusual bleeding, bruising, clotting or any changes to diet or medication. FU 1 week.    Tamela Schuler, Pharmacy Intern

## 2020-11-11 ENCOUNTER — ANTICOAGULATION MONITORING (OUTPATIENT)
Dept: VASCULAR LAB | Facility: MEDICAL CENTER | Age: 70
End: 2020-11-11

## 2020-11-11 DIAGNOSIS — I48.0 PAROXYSMAL ATRIAL FIBRILLATION (HCC): ICD-10-CM

## 2020-11-11 DIAGNOSIS — I63.9 ISCHEMIC CEREBROVASCULAR ACCIDENT (CVA) (HCC): ICD-10-CM

## 2020-11-11 DIAGNOSIS — Z79.01 LONG TERM (CURRENT) USE OF ANTICOAGULANTS: ICD-10-CM

## 2020-11-11 LAB — INR PPP: 2.6 (ref 2–3.5)

## 2020-11-11 NOTE — PROGRESS NOTES
Anticoagulation Summary  As of 2020    INR goal:  2.0-3.0   TTR:  60.3 % (3.8 mo)   INR used for dosin.60 (2020)   Warfarin maintenance plan:  10 mg (5 mg x 2) every day   Weekly warfarin total:  70 mg   Plan last modified:  Idris EnnisD (8/10/2020)   Next INR check:  2020   Target end date:  Indefinite    Indications    Paroxysmal atrial fibrillation (HCC) [I48.0]  Long term (current) use of anticoagulants [Z79.01] [Z79.01]  Ischemic cerebrovascular accident (CVA) (HCC) [I63.9]             Anticoagulation Episode Summary     INR check location:  Anticoagulation Clinic    Preferred lab:      Send INR reminders to:      Comments:        Anticoagulation Care Providers     Provider Role Specialty Phone number    MILO Padilla  [facility] [phone]        Anticoagulation Patient Findings      Spoke with pt's .  INR is therapeutic.   Pt denies any unusual s/s of bleeding, bruising, clotting or any changes to diet or medications. Denies any etoh, cranberries, supplements, or illness.   Pt verifies warfarin weekly dosing.     Will have pt continue regimen    Repeat INR in 2 week(s).     Jolanta Landon, PharmD

## 2020-11-25 ENCOUNTER — ANTICOAGULATION MONITORING (OUTPATIENT)
Dept: VASCULAR LAB | Facility: MEDICAL CENTER | Age: 70
End: 2020-11-25

## 2020-11-25 DIAGNOSIS — I48.0 PAROXYSMAL ATRIAL FIBRILLATION (HCC): ICD-10-CM

## 2020-11-25 DIAGNOSIS — I63.9 ISCHEMIC CEREBROVASCULAR ACCIDENT (CVA) (HCC): ICD-10-CM

## 2020-11-25 DIAGNOSIS — Z79.01 LONG TERM (CURRENT) USE OF ANTICOAGULANTS: ICD-10-CM

## 2020-11-25 LAB — INR PPP: 2.4 (ref 2–3.5)

## 2020-11-25 NOTE — PROGRESS NOTES
Anticoagulation Summary  As of 2020    INR goal:  2.0-3.0   TTR:  64.6 % (4.3 mo)   INR used for dosin.40 (2020)   Warfarin maintenance plan:  10 mg (5 mg x 2) every day   Weekly warfarin total:  70 mg   Plan last modified:  Jaspal Oh, PharmD (8/10/2020)   Next INR check:  2020   Target end date:  Indefinite    Indications    Paroxysmal atrial fibrillation (HCC) [I48.0]  Long term (current) use of anticoagulants [Z79.01] [Z79.01]  Ischemic cerebrovascular accident (CVA) (HCC) [I63.9]             Anticoagulation Episode Summary     INR check location:  Anticoagulation Clinic    Preferred lab:      Send INR reminders to:      Comments:        Anticoagulation Care Providers     Provider Role Specialty Phone number    MILO Padilla  [facility] [phone]        Anticoagulation Patient Findings          Left voicemail message to report a therapeutic INR of 2.4.  Pt to continue with current warfarin dosing regimen. Requested pt contact the clinic for any s/s of unusual bleeding, bruising, clotting or any changes to diet or medication. FU 2 weeks.    Harjit Romero, PharmD

## 2020-12-09 ENCOUNTER — ANTICOAGULATION MONITORING (OUTPATIENT)
Dept: VASCULAR LAB | Facility: MEDICAL CENTER | Age: 70
End: 2020-12-09

## 2020-12-09 DIAGNOSIS — I63.9 ISCHEMIC CEREBROVASCULAR ACCIDENT (CVA) (HCC): ICD-10-CM

## 2020-12-09 DIAGNOSIS — I48.0 PAROXYSMAL ATRIAL FIBRILLATION (HCC): ICD-10-CM

## 2020-12-09 DIAGNOSIS — Z79.01 LONG TERM (CURRENT) USE OF ANTICOAGULANTS: ICD-10-CM

## 2020-12-09 LAB — INR PPP: 2.8 (ref 2–3.5)

## 2020-12-10 NOTE — PROGRESS NOTES
Anticoagulation Summary  As of 2020    INR goal:  2.0-3.0   TTR:  68.1 % (4.8 mo)   INR used for dosin.80 (2020)   Warfarin maintenance plan:  10 mg (5 mg x 2) every day   Weekly warfarin total:  70 mg   Plan last modified:  Jaspal Oh PharmD (8/10/2020)   Next INR check:  2020   Target end date:  Indefinite    Indications    Paroxysmal atrial fibrillation (HCC) [I48.0]  Long term (current) use of anticoagulants [Z79.01] [Z79.01]  Ischemic cerebrovascular accident (CVA) (HCC) [I63.9]             Anticoagulation Episode Summary     INR check location:  Anticoagulation Clinic    Preferred lab:      Send INR reminders to:      Comments:        Anticoagulation Care Providers     Provider Role Specialty Phone number    MILO Padilla  [facility] [phone]        Anticoagulation Patient Findings      Spoke with patient's  to report a therapeutic INR.    Pt instructed to continue with current warfarin dosing regimen, confirms dosing.   Pt denies any s/s of bleeding, bruising, clotting or any changes to diet or medication.    Will follow up in 2 week(s).     Jolanta Landon, PharmD

## 2020-12-18 NOTE — TELEPHONE ENCOUNTER
Received request via: Pharmacy    Was the patient seen in the last year in this department? Yes    Does the patient have an active prescription (recently filled or refills available) for medication(s) requested? No     Last OV 7/2020

## 2020-12-19 RX ORDER — DILTIAZEM HYDROCHLORIDE 120 MG/1
120 TABLET, FILM COATED ORAL DAILY
Qty: 90 TAB | Refills: 3 | Status: SHIPPED | OUTPATIENT
Start: 2020-12-19 | End: 2020-12-22 | Stop reason: SDUPTHER

## 2020-12-19 RX ORDER — SIMVASTATIN 10 MG
10 TABLET ORAL EVERY EVENING
Qty: 90 TAB | Refills: 3 | Status: SHIPPED | OUTPATIENT
Start: 2020-12-19 | End: 2020-12-22 | Stop reason: SDUPTHER

## 2020-12-19 RX ORDER — CARVEDILOL 25 MG/1
50 TABLET ORAL 2 TIMES DAILY
Qty: 360 TAB | Refills: 3 | Status: SHIPPED | OUTPATIENT
Start: 2020-12-19 | End: 2021-04-03 | Stop reason: SDUPTHER

## 2020-12-22 RX ORDER — DILTIAZEM HYDROCHLORIDE 120 MG/1
120 TABLET, FILM COATED ORAL DAILY
Qty: 90 TAB | Refills: 3 | Status: SHIPPED | OUTPATIENT
Start: 2020-12-22 | End: 2021-07-19 | Stop reason: SDUPTHER

## 2020-12-22 RX ORDER — CARVEDILOL 25 MG/1
50 TABLET ORAL 2 TIMES DAILY
Qty: 360 TAB | Refills: 3 | OUTPATIENT
Start: 2020-12-22

## 2020-12-22 RX ORDER — SIMVASTATIN 10 MG
10 TABLET ORAL EVERY EVENING
Qty: 90 TAB | Refills: 3 | Status: SHIPPED | OUTPATIENT
Start: 2020-12-22 | End: 2021-12-21

## 2020-12-23 ENCOUNTER — ANTICOAGULATION MONITORING (OUTPATIENT)
Dept: VASCULAR LAB | Facility: MEDICAL CENTER | Age: 70
End: 2020-12-23

## 2020-12-23 DIAGNOSIS — I63.9 ISCHEMIC CEREBROVASCULAR ACCIDENT (CVA) (HCC): ICD-10-CM

## 2020-12-23 DIAGNOSIS — I48.0 PAROXYSMAL ATRIAL FIBRILLATION (HCC): ICD-10-CM

## 2020-12-23 DIAGNOSIS — Z79.01 LONG TERM (CURRENT) USE OF ANTICOAGULANTS: ICD-10-CM

## 2020-12-23 LAB — INR PPP: 2.9 (ref 2–3.5)

## 2020-12-23 RX ORDER — CHLORTHALIDONE 25 MG/1
TABLET ORAL
Qty: 90 TAB | Refills: 1 | Status: SHIPPED | OUTPATIENT
Start: 2020-12-23 | End: 2021-03-30

## 2020-12-23 RX ORDER — CLONIDINE HYDROCHLORIDE 0.2 MG/1
TABLET ORAL
Qty: 180 TAB | Refills: 1 | Status: SHIPPED | OUTPATIENT
Start: 2020-12-23 | End: 2021-03-30

## 2020-12-23 NOTE — TELEPHONE ENCOUNTER
Last seen by PCP 07/16/2020.   Last Blood Pressure reading was 138/72 on 08/19/2020  Will send 6 month(s) to the pharmacy.

## 2020-12-24 NOTE — PROGRESS NOTES
Anticoagulation Summary  As of 2020    INR goal:  2.0-3.0   TTR:  70.9 % (5.2 mo)   INR used for dosin.90 (2020)   Warfarin maintenance plan:  10 mg (5 mg x 2) every day   Weekly warfarin total:  70 mg   Plan last modified:  Idris EnnisD (8/10/2020)   Next INR check:  2021   Target end date:  Indefinite    Indications    Paroxysmal atrial fibrillation (HCC) [I48.0]  Long term (current) use of anticoagulants [Z79.01] [Z79.01]  Ischemic cerebrovascular accident (CVA) (HCC) [I63.9]             Anticoagulation Episode Summary     INR check location:  Anticoagulation Clinic    Preferred lab:      Send INR reminders to:      Comments:        Anticoagulation Care Providers     Provider Role Specialty Phone number    JO ANN Padilla.  [facility] [phone]        Anticoagulation Patient Findings      Left voicemail message to report a therapeutic INR of 2.9.      Pt to continue with current warfarin dosing regimen. Requested pt contact the clinic for any s/s of unusual bleeding, bruising, clotting or any changes to diet or medication.    FU INR in 2 week(s).    Jolanta Landon, PharmD

## 2021-01-06 ENCOUNTER — ANTICOAGULATION MONITORING (OUTPATIENT)
Dept: VASCULAR LAB | Facility: MEDICAL CENTER | Age: 71
End: 2021-01-06

## 2021-01-06 DIAGNOSIS — I63.9 ISCHEMIC CEREBROVASCULAR ACCIDENT (CVA) (HCC): ICD-10-CM

## 2021-01-06 DIAGNOSIS — Z79.01 LONG TERM (CURRENT) USE OF ANTICOAGULANTS: ICD-10-CM

## 2021-01-06 DIAGNOSIS — I48.0 PAROXYSMAL ATRIAL FIBRILLATION (HCC): ICD-10-CM

## 2021-01-06 LAB — INR PPP: 2.3 (ref 2–3.5)

## 2021-01-07 NOTE — PROGRESS NOTES
Anticoagulation Summary  As of 2021    INR goal:  2.0-3.0   TTR:  73.3 % (5.7 mo)   INR used for dosin.30 (2021)   Warfarin maintenance plan:  10 mg (5 mg x 2) every day   Weekly warfarin total:  70 mg   Plan last modified:  Jaspal Oh, PharmD (8/10/2020)   Next INR check:  2021   Target end date:  Indefinite    Indications    Paroxysmal atrial fibrillation (HCC) [I48.0]  Long term (current) use of anticoagulants [Z79.01] [Z79.01]  Ischemic cerebrovascular accident (CVA) (HCC) [I63.9]             Anticoagulation Episode Summary     INR check location:  Anticoagulation Clinic    Preferred lab:      Send INR reminders to:      Comments:        Anticoagulation Care Providers     Provider Role Specialty Phone number    MILO Padilla  [facility] [phone]        Anticoagulation Patient Findings        Left voicemail message to report a  therapeutic INR of 2.3.  Pt to continue with current warfarin dosing regimen. Requested pt contact the clinic for any s/s of unusual bleeding, bruising, clotting or any changes to diet or medication.  Follow up in 2 weeks, to reduce the risk of adverse events related to this high risk medication, warfarin.    Anisha London, Clinical Pharmacist

## 2021-01-20 ENCOUNTER — ANTICOAGULATION MONITORING (OUTPATIENT)
Dept: VASCULAR LAB | Facility: MEDICAL CENTER | Age: 71
End: 2021-01-20

## 2021-01-20 DIAGNOSIS — I63.9 ISCHEMIC CEREBROVASCULAR ACCIDENT (CVA) (HCC): ICD-10-CM

## 2021-01-20 DIAGNOSIS — I48.0 PAROXYSMAL ATRIAL FIBRILLATION (HCC): ICD-10-CM

## 2021-01-20 DIAGNOSIS — Z79.01 LONG TERM (CURRENT) USE OF ANTICOAGULANTS: ICD-10-CM

## 2021-01-20 LAB — INR PPP: 2.4 (ref 2–3.5)

## 2021-01-21 NOTE — PROGRESS NOTES
Anticoagulation Summary  As of 2021    INR goal:  2.0-3.0   TTR:  75.3 % (6.2 mo)   INR used for dosin.40 (2021)   Warfarin maintenance plan:  10 mg (5 mg x 2) every day   Weekly warfarin total:  70 mg   Plan last modified:  Idris EnnisD (8/10/2020)   Next INR check:  2/3/2021   Target end date:  Indefinite    Indications    Paroxysmal atrial fibrillation (HCC) [I48.0]  Long term (current) use of anticoagulants [Z79.01] [Z79.01]  Ischemic cerebrovascular accident (CVA) (HCC) [I63.9]             Anticoagulation Episode Summary     INR check location:  Anticoagulation Clinic    Preferred lab:      Send INR reminders to:      Comments:        Anticoagulation Care Providers     Provider Role Specialty Phone number    MILO Padilla  [facility] [phone]        Anticoagulation Patient Findings      Spoke with patient's  to report a therapeutic INR.    Pt instructed to continue with current warfarin dosing regimen, confirms dosing.   Pt denies any s/s of bleeding, bruising, clotting or any changes to diet or medication.    Will follow up in 2 week(s).     Jolanta Landon, PharmD

## 2021-02-03 ENCOUNTER — ANTICOAGULATION MONITORING (OUTPATIENT)
Dept: MEDICAL GROUP | Facility: PHYSICIAN GROUP | Age: 71
End: 2021-02-03

## 2021-02-03 DIAGNOSIS — Z79.01 LONG TERM (CURRENT) USE OF ANTICOAGULANTS: ICD-10-CM

## 2021-02-03 DIAGNOSIS — I48.0 PAROXYSMAL ATRIAL FIBRILLATION (HCC): ICD-10-CM

## 2021-02-03 DIAGNOSIS — I63.9 ISCHEMIC CEREBROVASCULAR ACCIDENT (CVA) (HCC): ICD-10-CM

## 2021-02-03 LAB — INR PPP: 2.3 (ref 2–3.5)

## 2021-02-04 NOTE — PROGRESS NOTES
Anticoagulation Summary  As of 2/3/2021    INR goal:  2.0-3.0   TTR:  77.1 % (6.6 mo)   INR used for dosin.30 (2/3/2021)   Warfarin maintenance plan:  10 mg (5 mg x 2) every day   Weekly warfarin total:  70 mg   Plan last modified:  Jaspal Oh, PharmD (8/10/2020)   Next INR check:     Target end date:  Indefinite    Indications    Paroxysmal atrial fibrillation (HCC) [I48.0]  Long term (current) use of anticoagulants [Z79.01] [Z79.01]  Ischemic cerebrovascular accident (CVA) (HCC) [I63.9]             Anticoagulation Episode Summary     INR check location:  Anticoagulation Clinic    Preferred lab:      Send INR reminders to:      Comments:        Anticoagulation Care Providers     Provider Role Specialty Phone number    JO ANN Padilla.  [facility] [phone]        Anticoagulation Patient Findings     Spoke with Allen to report a therapeutic INR of 2.3.Continue current dosing regimen.  Follow up in 2 weeks, to reduce the risk of adverse events related to this high risk medication, warfarin.    Anisha London, Clinical Pharmacist

## 2021-02-12 DIAGNOSIS — Z79.01 CHRONIC ANTICOAGULATION: ICD-10-CM

## 2021-02-12 RX ORDER — WARFARIN SODIUM 5 MG/1
TABLET ORAL
Qty: 180 TABLET | Refills: 1 | Status: SHIPPED | OUTPATIENT
Start: 2021-02-12 | End: 2021-07-13

## 2021-02-17 ENCOUNTER — ANTICOAGULATION MONITORING (OUTPATIENT)
Dept: CARDIOLOGY | Facility: PHYSICIAN GROUP | Age: 71
End: 2021-02-17

## 2021-02-17 DIAGNOSIS — I63.9 ISCHEMIC CEREBROVASCULAR ACCIDENT (CVA) (HCC): ICD-10-CM

## 2021-02-17 DIAGNOSIS — I48.0 PAROXYSMAL ATRIAL FIBRILLATION (HCC): ICD-10-CM

## 2021-02-17 DIAGNOSIS — Z79.01 LONG TERM (CURRENT) USE OF ANTICOAGULANTS: ICD-10-CM

## 2021-02-17 LAB — INR PPP: 2.7 (ref 2–3.5)

## 2021-02-18 NOTE — PROGRESS NOTES
OP Telephone Anticoagulation Service Note    Date: 2021      Anticoagulation Summary  As of 2021    INR goal:  2.0-3.0   TTR:  78.6 % (7.1 mo)   INR used for dosin.70 (2021)   Warfarin maintenance plan:  10 mg (5 mg x 2) every day   Weekly warfarin total:  70 mg   Plan last modified:  Jaspal Oh PharmD (8/10/2020)   Next INR check:  3/3/2021   Target end date:  Indefinite    Indications    Paroxysmal atrial fibrillation (HCC) [I48.0]  Long term (current) use of anticoagulants [Z79.01] [Z79.01]  Ischemic cerebrovascular accident (CVA) (HCC) [I63.9]             Anticoagulation Episode Summary     INR check location:  Anticoagulation Clinic    Preferred lab:      Send INR reminders to:      Comments:        Anticoagulation Care Providers     Provider Role Specialty Phone number    MILO Padilla  [facility] [phone]        Anticoagulation Patient Findings      INR therapeutic at 2.7.  Left voicemail message for pt.  Verified regimen.  Instructed pt to continue on with current regimen.  Told pt to call if any s/s of bleeding or medication changes.  Check INR in 2 week(s).  Instructed pt to call clinic at [phone] if there are any questions.    Idris RodriguezD

## 2021-03-03 ENCOUNTER — ANTICOAGULATION MONITORING (OUTPATIENT)
Dept: VASCULAR LAB | Facility: MEDICAL CENTER | Age: 71
End: 2021-03-03

## 2021-03-03 DIAGNOSIS — I48.0 PAROXYSMAL ATRIAL FIBRILLATION (HCC): ICD-10-CM

## 2021-03-03 DIAGNOSIS — Z79.01 LONG TERM (CURRENT) USE OF ANTICOAGULANTS: ICD-10-CM

## 2021-03-03 DIAGNOSIS — I63.9 ISCHEMIC CEREBROVASCULAR ACCIDENT (CVA) (HCC): ICD-10-CM

## 2021-03-03 LAB — INR PPP: 2.3 (ref 2–3.5)

## 2021-03-03 NOTE — PROGRESS NOTES
Anticoagulation Summary  As of 3/3/2021    INR goal:  2.0-3.0   TTR:  79.9 % (7.6 mo)   INR used for dosin.30 (3/3/2021)   Warfarin maintenance plan:  10 mg (5 mg x 2) every day   Weekly warfarin total:  70 mg   Plan last modified:  Jaspal Oh, PharmD (8/10/2020)   Next INR check:  3/17/2021   Target end date:  Indefinite    Indications    Paroxysmal atrial fibrillation (HCC) [I48.0]  Long term (current) use of anticoagulants [Z79.01] [Z79.01]  Ischemic cerebrovascular accident (CVA) (HCC) [I63.9]             Anticoagulation Episode Summary     INR check location:  Anticoagulation Clinic    Preferred lab:      Send INR reminders to:      Comments:        Anticoagulation Care Providers     Provider Role Specialty Phone number    MILO Padilla  [facility] [phone]        Anticoagulation Patient Findings    Left voicemail message to report a therapeutic INR of 2.3.  Pt to continue with current warfarin dosing regimen. Requested pt contact the clinic for any s/s of unusual bleeding, bruising, clotting or any changes to diet or medication. FU 2 weeks.  Nestor Burch, IdrisD, BCACP

## 2021-03-17 ENCOUNTER — ANTICOAGULATION MONITORING (OUTPATIENT)
Dept: VASCULAR LAB | Facility: MEDICAL CENTER | Age: 71
End: 2021-03-17

## 2021-03-17 DIAGNOSIS — I63.9 ISCHEMIC CEREBROVASCULAR ACCIDENT (CVA) (HCC): ICD-10-CM

## 2021-03-17 DIAGNOSIS — Z79.01 LONG TERM (CURRENT) USE OF ANTICOAGULANTS: ICD-10-CM

## 2021-03-17 DIAGNOSIS — I48.0 PAROXYSMAL ATRIAL FIBRILLATION (HCC): ICD-10-CM

## 2021-03-17 LAB — INR PPP: 2.8 (ref 2–3.5)

## 2021-03-17 NOTE — PROGRESS NOTES
Anticoagulation Summary  As of 3/17/2021    INR goal:  2.0-3.0   TTR:  81.1 % (8 mo)   INR used for dosin.80 (3/17/2021)   Warfarin maintenance plan:  10 mg (5 mg x 2) every day   Weekly warfarin total:  70 mg   No change documented:  Cale Huntley Med Ass't   Plan last modified:  Idris EnnisD (8/10/2020)   Next INR check:  3/31/2021   Target end date:  Indefinite    Indications    Paroxysmal atrial fibrillation (HCC) [I48.0]  Long term (current) use of anticoagulants [Z79.01] [Z79.01]  Ischemic cerebrovascular accident (CVA) (HCC) [I63.9]             Anticoagulation Episode Summary     INR check location:  Anticoagulation Clinic    Preferred lab:      Send INR reminders to:      Comments:        Anticoagulation Care Providers     Provider Role Specialty Phone number    NATALI PadillaRMIKI.  [facility] [phone]        Anticoagulation Patient Findings  Patient Findings     Negatives:  Signs/symptoms of thrombosis, Signs/symptoms of bleeding, Laboratory test error suspected, Change in health, Change in alcohol use, Change in activity, Upcoming invasive procedure, Emergency department visit, Upcoming dental procedure, Missed doses, Extra doses, Change in medications, Change in diet/appetite, Hospital admission, Bruising, Other complaints        Spoke with patient to report a therapeutic INR.  Pt instructed to continue with current warfarin dosing regimen. Pt denies any s/s of bleeding, bruising, clotting or any changes to diet or medication.  Will follow up in 2 weeks.    Cale Huntley Med Ass't    I have reviewed and am in agreement with the above stated plan on 3-17-21.  Nestor Burch, IdrisD, BCACP

## 2021-03-31 ENCOUNTER — ANTICOAGULATION MONITORING (OUTPATIENT)
Dept: VASCULAR LAB | Facility: MEDICAL CENTER | Age: 71
End: 2021-03-31

## 2021-03-31 DIAGNOSIS — Z79.01 LONG TERM (CURRENT) USE OF ANTICOAGULANTS: ICD-10-CM

## 2021-03-31 DIAGNOSIS — I63.9 ISCHEMIC CEREBROVASCULAR ACCIDENT (CVA) (HCC): ICD-10-CM

## 2021-03-31 DIAGNOSIS — I48.0 PAROXYSMAL ATRIAL FIBRILLATION (HCC): ICD-10-CM

## 2021-03-31 LAB — INR PPP: 2.2 (ref 2–3.5)

## 2021-03-31 NOTE — PROGRESS NOTES
Anticoagulation Summary  As of 3/31/2021    INR goal:  2.0-3.0   TTR:  82.1 % (8.5 mo)   INR used for dosin.20 (3/31/2021)   Warfarin maintenance plan:  10 mg (5 mg x 2) every day   Weekly warfarin total:  70 mg   Plan last modified:  Jaspal Oh, PharmD (8/10/2020)   Next INR check:  2021   Target end date:  Indefinite    Indications    Paroxysmal atrial fibrillation (HCC) [I48.0]  Long term (current) use of anticoagulants [Z79.01] [Z79.01]  Ischemic cerebrovascular accident (CVA) (HCC) [I63.9]             Anticoagulation Episode Summary     INR check location:  Anticoagulation Clinic    Preferred lab:      Send INR reminders to:      Comments:        Anticoagulation Care Providers     Provider Role Specialty Phone number    MILO Padilla  [facility] [phone]        Anticoagulation Patient Findings    Spoke with patients  Allen today regarding therapeutic INR of 2.2.  Patient denies any signs/symptoms of bruising or bleeding or any changes in diet and medications.  Instructed patient to call clinic with any questions or concerns.  Pt is to continue with current warfarin dosing regimen.  Follow up in 2 weeks, to reduce risk of adverse events related to this high risk medication,  Warfarin.    Nestor Burch, PharmD, BCACP

## 2021-04-05 RX ORDER — CARVEDILOL 25 MG/1
50 TABLET ORAL 2 TIMES DAILY
Qty: 360 TABLET | Refills: 3 | Status: SHIPPED | OUTPATIENT
Start: 2021-04-05 | End: 2021-12-21

## 2021-04-14 ENCOUNTER — ANTICOAGULATION MONITORING (OUTPATIENT)
Dept: VASCULAR LAB | Facility: MEDICAL CENTER | Age: 71
End: 2021-04-14

## 2021-04-14 DIAGNOSIS — I63.9 ISCHEMIC CEREBROVASCULAR ACCIDENT (CVA) (HCC): ICD-10-CM

## 2021-04-14 DIAGNOSIS — Z79.01 LONG TERM (CURRENT) USE OF ANTICOAGULANTS: ICD-10-CM

## 2021-04-14 DIAGNOSIS — I48.0 PAROXYSMAL ATRIAL FIBRILLATION (HCC): ICD-10-CM

## 2021-04-14 LAB — INR PPP: 2.8 (ref 2–3.5)

## 2021-04-14 NOTE — PROGRESS NOTES
Anticoagulation Summary  As of 2021    INR goal:  2.0-3.0   TTR:  83.0 % (9 mo)   INR used for dosin.80 (2021)   Warfarin maintenance plan:  10 mg (5 mg x 2) every day   Weekly warfarin total:  70 mg   Plan last modified:  Jaspal Oh, PharmD (8/10/2020)   Next INR check:  2021   Target end date:  Indefinite    Indications    Paroxysmal atrial fibrillation (HCC) [I48.0]  Long term (current) use of anticoagulants [Z79.01] [Z79.01]  Ischemic cerebrovascular accident (CVA) (HCC) [I63.9]             Anticoagulation Episode Summary     INR check location:  Anticoagulation Clinic    Preferred lab:      Send INR reminders to:      Comments:        Anticoagulation Care Providers     Provider Role Specialty Phone number    MILO Padilla  [facility] [phone]        Anticoagulation Patient Findings    Spoke with patient today regarding therapeutic INR of 2.8.  Patient denies any signs/symptoms of bruising or bleeding or any changes in diet and medications.  Instructed patient to call clinic with any questions or concerns.  Follow up in 2 weeks, to reduce risk of adverse events related to this high risk medication,  Warfarin.    Josey Bautista - Student Pharmacist

## 2021-04-28 ENCOUNTER — ANTICOAGULATION MONITORING (OUTPATIENT)
Dept: VASCULAR LAB | Facility: MEDICAL CENTER | Age: 71
End: 2021-04-28

## 2021-04-28 DIAGNOSIS — Z79.01 LONG TERM (CURRENT) USE OF ANTICOAGULANTS: ICD-10-CM

## 2021-04-28 DIAGNOSIS — I48.0 PAROXYSMAL ATRIAL FIBRILLATION (HCC): ICD-10-CM

## 2021-04-28 DIAGNOSIS — I63.9 ISCHEMIC CEREBROVASCULAR ACCIDENT (CVA) (HCC): ICD-10-CM

## 2021-04-28 LAB — INR PPP: 2.3 (ref 2–3.5)

## 2021-04-30 NOTE — PROGRESS NOTES
Anticoagulation Summary  As of 2021    INR goal:  2.0-3.0   TTR:  83.9 % (9.4 mo)   INR used for dosin.30 (2021)   Warfarin maintenance plan:  10 mg (5 mg x 2) every day   Weekly warfarin total:  70 mg   Plan last modified:  Idris EnnisD (8/10/2020)   Next INR check:  2021   Target end date:  Indefinite    Indications    Paroxysmal atrial fibrillation (HCC) [I48.0]  Long term (current) use of anticoagulants [Z79.01] [Z79.01]  Ischemic cerebrovascular accident (CVA) (HCC) [I63.9]             Anticoagulation Episode Summary     INR check location:  Anticoagulation Clinic    Preferred lab:      Send INR reminders to:      Comments:        Anticoagulation Care Providers     Provider Role Specialty Phone number    MILO Padilla  [facility] [phone]        Anticoagulation Patient Findings      Spoke with patient's  to report a therapeutic INR.      Pt denies any s/s of bleeding, bruising, clotting or any changes to diet or medication.      Pt instructed to continue with current warfarin dosing regimen, confirms dosing.   Will follow up in 2 week(s).     Jolanta Landon, PharmD     0

## 2021-05-10 ENCOUNTER — HOSPITAL ENCOUNTER (OUTPATIENT)
Dept: LAB | Facility: MEDICAL CENTER | Age: 71
End: 2021-05-10
Attending: NURSE PRACTITIONER
Payer: MEDICARE

## 2021-05-10 DIAGNOSIS — I10 ESSENTIAL HYPERTENSION: ICD-10-CM

## 2021-05-10 DIAGNOSIS — E78.2 MIXED HYPERLIPIDEMIA: ICD-10-CM

## 2021-05-10 PROCEDURE — 85025 COMPLETE CBC W/AUTO DIFF WBC: CPT

## 2021-05-10 PROCEDURE — 80053 COMPREHEN METABOLIC PANEL: CPT

## 2021-05-10 PROCEDURE — 80061 LIPID PANEL: CPT

## 2021-05-10 PROCEDURE — 36415 COLL VENOUS BLD VENIPUNCTURE: CPT

## 2021-05-11 LAB
ALBUMIN SERPL BCP-MCNC: 4.3 G/DL (ref 3.2–4.9)
ALBUMIN/GLOB SERPL: 1.6 G/DL
ALP SERPL-CCNC: 54 U/L (ref 30–99)
ALT SERPL-CCNC: 24 U/L (ref 2–50)
ANION GAP SERPL CALC-SCNC: 5 MMOL/L (ref 7–16)
AST SERPL-CCNC: 22 U/L (ref 12–45)
BASOPHILS # BLD AUTO: 1.3 % (ref 0–1.8)
BASOPHILS # BLD: 0.07 K/UL (ref 0–0.12)
BILIRUB SERPL-MCNC: 0.5 MG/DL (ref 0.1–1.5)
BUN SERPL-MCNC: 25 MG/DL (ref 8–22)
CALCIUM SERPL-MCNC: 10 MG/DL (ref 8.5–10.5)
CHLORIDE SERPL-SCNC: 96 MMOL/L (ref 96–112)
CHOLEST SERPL-MCNC: 136 MG/DL (ref 100–199)
CO2 SERPL-SCNC: 33 MMOL/L (ref 20–33)
CREAT SERPL-MCNC: 0.92 MG/DL (ref 0.5–1.4)
EOSINOPHIL # BLD AUTO: 0.21 K/UL (ref 0–0.51)
EOSINOPHIL NFR BLD: 4 % (ref 0–6.9)
ERYTHROCYTE [DISTWIDTH] IN BLOOD BY AUTOMATED COUNT: 48.1 FL (ref 35.9–50)
FASTING STATUS PATIENT QL REPORTED: NORMAL
GLOBULIN SER CALC-MCNC: 2.7 G/DL (ref 1.9–3.5)
GLUCOSE SERPL-MCNC: 112 MG/DL (ref 65–99)
HCT VFR BLD AUTO: 48.3 % (ref 37–47)
HDLC SERPL-MCNC: 42 MG/DL
HGB BLD-MCNC: 15.6 G/DL (ref 12–16)
IMM GRANULOCYTES # BLD AUTO: 0.02 K/UL (ref 0–0.11)
IMM GRANULOCYTES NFR BLD AUTO: 0.4 % (ref 0–0.9)
LDLC SERPL CALC-MCNC: 80 MG/DL
LYMPHOCYTES # BLD AUTO: 1.81 K/UL (ref 1–4.8)
LYMPHOCYTES NFR BLD: 34.7 % (ref 22–41)
MCH RBC QN AUTO: 30.1 PG (ref 27–33)
MCHC RBC AUTO-ENTMCNC: 32.3 G/DL (ref 33.6–35)
MCV RBC AUTO: 93.1 FL (ref 81.4–97.8)
MONOCYTES # BLD AUTO: 0.55 K/UL (ref 0–0.85)
MONOCYTES NFR BLD AUTO: 10.5 % (ref 0–13.4)
NEUTROPHILS # BLD AUTO: 2.56 K/UL (ref 2–7.15)
NEUTROPHILS NFR BLD: 49.1 % (ref 44–72)
NRBC # BLD AUTO: 0 K/UL
NRBC BLD-RTO: 0 /100 WBC
PLATELET # BLD AUTO: 240 K/UL (ref 164–446)
PMV BLD AUTO: 11.1 FL (ref 9–12.9)
POTASSIUM SERPL-SCNC: 3.7 MMOL/L (ref 3.6–5.5)
PROT SERPL-MCNC: 7 G/DL (ref 6–8.2)
RBC # BLD AUTO: 5.19 M/UL (ref 4.2–5.4)
SODIUM SERPL-SCNC: 134 MMOL/L (ref 135–145)
TRIGL SERPL-MCNC: 70 MG/DL (ref 0–149)
WBC # BLD AUTO: 5.2 K/UL (ref 4.8–10.8)

## 2021-05-12 ENCOUNTER — OFFICE VISIT (OUTPATIENT)
Dept: MEDICAL GROUP | Facility: PHYSICIAN GROUP | Age: 71
End: 2021-05-12
Payer: MEDICARE

## 2021-05-12 ENCOUNTER — ANTICOAGULATION MONITORING (OUTPATIENT)
Dept: MEDICAL GROUP | Facility: MEDICAL CENTER | Age: 71
End: 2021-05-12

## 2021-05-12 VITALS
HEIGHT: 65 IN | RESPIRATION RATE: 20 BRPM | DIASTOLIC BLOOD PRESSURE: 82 MMHG | HEART RATE: 85 BPM | SYSTOLIC BLOOD PRESSURE: 146 MMHG | BODY MASS INDEX: 36.15 KG/M2 | TEMPERATURE: 98.5 F | WEIGHT: 217 LBS | OXYGEN SATURATION: 95 %

## 2021-05-12 DIAGNOSIS — I10 ESSENTIAL HYPERTENSION: ICD-10-CM

## 2021-05-12 DIAGNOSIS — Z23 NEED FOR VACCINATION: ICD-10-CM

## 2021-05-12 DIAGNOSIS — R73.01 ELEVATED FASTING GLUCOSE: ICD-10-CM

## 2021-05-12 DIAGNOSIS — I48.0 PAROXYSMAL ATRIAL FIBRILLATION (HCC): ICD-10-CM

## 2021-05-12 DIAGNOSIS — E78.2 MIXED HYPERLIPIDEMIA: ICD-10-CM

## 2021-05-12 DIAGNOSIS — I63.9 ISCHEMIC CEREBROVASCULAR ACCIDENT (CVA) (HCC): ICD-10-CM

## 2021-05-12 DIAGNOSIS — Z79.01 LONG TERM (CURRENT) USE OF ANTICOAGULANTS: ICD-10-CM

## 2021-05-12 LAB — INR PPP: 3.2 (ref 2–3.5)

## 2021-05-12 PROCEDURE — 99214 OFFICE O/P EST MOD 30 MIN: CPT | Performed by: NURSE PRACTITIONER

## 2021-05-12 ASSESSMENT — FIBROSIS 4 INDEX: FIB4 SCORE: 1.33

## 2021-05-12 ASSESSMENT — PATIENT HEALTH QUESTIONNAIRE - PHQ9: CLINICAL INTERPRETATION OF PHQ2 SCORE: 0

## 2021-05-12 NOTE — PROGRESS NOTES
Chief Complaint   Patient presents with   • Hypertension       HISTORY OF PRESENT ILLNESS: Patient is a 70 y.o. female established patient who presents today to next month follow-up, review labs    Paroxysmal atrial fibrillation (HCC)  This is a chronic condition, stable closely followed by cardiology, on carvedilol, will chlorthalidone, clonidine, diltiazem and is followed by the anticoagulation clinic as she is on Coumadin patient also carries diagnoses of hypertension hyperlipidemia    She is appropriately on statin, does not need refills at this time, up-to-date with labs  Encouraged to continue care per cardiology, she will be due for annual exam in August      Mixed hyperlipidemia  The ASCVD Risk score (Kelsie MARQUEZ Jr, et al., 2013) failed to calculate.  Patient appropriately on statin, takes simvastatin 10 mg daily  Up-to-date with labs  Closely followed by cardiology  Does not need refills at this time    Essential hypertension  This is a chronic condition, stable and usually well controlled on current medications however blood pressure very slightly elevated today 146/82  Denies symptoms associated with this  Closely followed by cardiology  Does not need refills at this time    Elevated fasting glucose  Patient found to have mildly elevated fasting glucose with recent labs, glucose was 112, she does state that she was in fact fasting does not carry  diagnoses of type 2 diabetes or pre or early diabetes  We discussed at length appropriate diet, less carbs, less fruits if she is eating more than 3 servings per day  We will reevaluate with labs to include hemoglobin A1c in 2 months        Patient Active Problem List    Diagnosis Date Noted   • Elevated fasting glucose 05/13/2021   • History of echocardiogram - 7/2019 normal    • Obesity (BMI 30-39.9)    • Essential hypertension 07/16/2020   • Mixed hyperlipidemia 07/16/2020   • Ischemic cerebrovascular accident (CVA) (HCC) 05/05/2017   • Paroxysmal atrial  fibrillation (HCC) 2017   • Long term (current) use of anticoagulants [Z79.01] 2017       Allergies:Sulfa drugs    Current Outpatient Medications   Medication Sig Dispense Refill   • carvedilol (COREG) 25 MG Tab Take 2 Tablets by mouth 2 times a day. 360 tablet 3   • cloNIDine (CATAPRES) 0.2 MG Tab TAKE 1 TABLET TWICE DAILY 180 tablet 1   • chlorthalidone (HYGROTON) 25 MG Tab TAKE 1 TABLET EVERY MORNING 90 tablet 1   • warfarin (COUMADIN) 5 MG Tab Take two tablets daily as directed by [facility] 180 tablet 1   • DILTIAZem (CARDIZEM) 120 MG Tab Take 1 Tab by mouth every day. (Patient taking differently: Take 120 mg by mouth 2 times a day.) 90 Tab 3   • simvastatin (ZOCOR) 10 MG Tab Take 1 Tab by mouth every evening. 90 Tab 3   • losartan (COZAAR) 100 MG Tab Take 1 Tab by mouth every day. 90 Tab 3     No current facility-administered medications for this visit.       Social History     Tobacco Use   • Smoking status: Never Smoker   • Smokeless tobacco: Never Used   Vaping Use   • Vaping Use: Never used   Substance Use Topics   • Alcohol use: Not Currently   • Drug use: Not Currently       Family Status   Relation Name Status   • Mo   at age 59     Family History   Problem Relation Age of Onset   • Heart Disease Mother        Review of Systems:   Constitutional: Negative for fever, chills, weight loss and malaise/fatigue.   Respiratory: Negative for cough, sputum production, shortness of breath and wheezing.    Cardiovascular: Negative for chest pain, palpitations, orthopnea and leg swelling.   Gastrointestinal: Negative for heartburn, nausea, vomiting and abdominal pain.   Genitourinary/Renal: Negative for dysuria, urgency and frequency.   Musculoskeletal: Negative for myalgias, back pain and joint pain.   Skin: Negative for rash and itching.   Neurological: Negative for dizziness, tingling, tremors, sensory change, focal weakness and headaches.   Endo/Heme/Allergies: positive  "per HPI    Exam:  /82   Pulse 85   Temp 36.9 °C (98.5 °F)   Resp 20   Ht 1.651 m (5' 5\")   Wt 98.4 kg (217 lb)   SpO2 95%   General:  Well nourished, well developed female in NAD  Skin: warm, dry, intact, no evidence of rash or concerning lesions  Head: is grossly normal.  HEENT: eyes clear, conjunctiva normal, PERRLA  Pulmonary: Clear to ausculation. Normal effort. No rales, ronchi, or wheezing.  Cardiovascular: Regular rate and rhythm without murmur. Carotid and radial pulses are intact and equal bilaterally.  Abdomen: soft, non-tender, positive bowel sounds  Musculoskeletal: no clubbing, cyanosis, or edema.  Psych/mental: no depression, anxiety, hallucinations  Neuro: alert, intact, CN 2-12 grossly intact    Medical decision-making and discussion:    As mentioned above discussed the importance of ongoing healthy lifestyle modifications including healthy diet, regular exercise and efforts towards weight loss.  She will have repeat labs done in 2 to 3 months but otherwise to follow-up with new PCP in 6 months.  She can call for refills as needed, advised to make annual appointment with cardiology that will be due in August        Assessment/Plan:  Tonya was seen today for hypertension.    Diagnoses and all orders for this visit:    Need for vaccination  -     Zoster Vac Recomb Adjuvanted (SHINGRIX) 50 MCG/0.5ML Recon Susp; Inject 0.5 mL into the shoulder, thigh, or buttocks one time for 1 dose.    Elevated fasting glucose  -     Basic Metabolic Panel; Future  -     HEMOGLOBIN A1C; Future    Paroxysmal atrial fibrillation (HCC)    Mixed hyperlipidemia    Essential hypertension         Return in about 6 months (around 11/12/2021) for Follow-up.    Please note that this dictation was created using voice recognition software. I have made every reasonable attempt to correct obvious errors, but I expect that there are errors of grammar and possibly content that I did not discover before finalizing the " note.

## 2021-05-12 NOTE — PROGRESS NOTES
Anticoagulation Summary  As of 5/12/2021    INR goal:  2.0-3.0   TTR:  83.6 % (9.9 mo)   INR used for dosing:  3.20 (5/12/2021)   Warfarin maintenance plan:  10 mg (5 mg x 2) every day   Weekly warfarin total:  70 mg   Plan last modified:  Jaspal Oh PharmD (8/10/2020)   Next INR check:  5/26/2021   Target end date:  Indefinite    Indications    Paroxysmal atrial fibrillation (HCC) [I48.0]  Long term (current) use of anticoagulants [Z79.01] [Z79.01]  Ischemic cerebrovascular accident (CVA) (HCC) [I63.9]             Anticoagulation Episode Summary     INR check location:  Anticoagulation Clinic    Preferred lab:      Send INR reminders to:      Comments:        Anticoagulation Care Providers     Provider Role Specialty Phone number    MILO Padilla  [facility] [phone]        Anticoagulation Patient Findings      Spoke with pt.  INR is supratherapeutic.   Pt denies any unusual s/s of bleeding, bruising, clotting or any changes to medications. Denies any etoh, cranberries, supplements, or illness.   Pt verifies warfarin weekly dosing.   Pt states she might have eaten less vitamin K this past week    Will have pt reduce dose x 1 day then continue regimen    Repeat INR in 2 week(s).     Jolanta Landon, PharmD

## 2021-05-13 PROBLEM — R73.01 ELEVATED FASTING GLUCOSE: Status: ACTIVE | Noted: 2021-05-13

## 2021-05-13 NOTE — ASSESSMENT & PLAN NOTE
This is a chronic condition, stable and usually well controlled on current medications however blood pressure very slightly elevated today 146/82  Denies symptoms associated with this  Closely followed by cardiology  Does not need refills at this time

## 2021-05-13 NOTE — ASSESSMENT & PLAN NOTE
Patient found to have mildly elevated fasting glucose with recent labs, glucose was 112, she does state that she was in fact fasting does not carry  diagnoses of type 2 diabetes or pre or early diabetes  We discussed at length appropriate diet, less carbs, less fruits if she is eating more than 3 servings per day  We will reevaluate with labs to include hemoglobin A1c in 2 months

## 2021-05-13 NOTE — ASSESSMENT & PLAN NOTE
The ASCVD Risk score (Kelsiemalcom MARQUEZ Jr, et al., 2013) failed to calculate.  Patient appropriately on statin, takes simvastatin 10 mg daily  Up-to-date with labs  Closely followed by cardiology  Does not need refills at this time

## 2021-05-13 NOTE — ASSESSMENT & PLAN NOTE
This is a chronic condition, stable closely followed by cardiology, on carvedilol, will chlorthalidone, clonidine, diltiazem and is followed by the anticoagulation clinic as she is on Coumadin patient also carries diagnoses of hypertension hyperlipidemia    She is appropriately on statin, does not need refills at this time, up-to-date with labs  Encouraged to continue care per cardiology, she will be due for annual exam in August

## 2021-05-26 ENCOUNTER — ANTICOAGULATION MONITORING (OUTPATIENT)
Dept: VASCULAR LAB | Facility: MEDICAL CENTER | Age: 71
End: 2021-05-26

## 2021-05-26 DIAGNOSIS — I48.0 PAROXYSMAL ATRIAL FIBRILLATION (HCC): ICD-10-CM

## 2021-05-26 DIAGNOSIS — I63.9 ISCHEMIC CEREBROVASCULAR ACCIDENT (CVA) (HCC): ICD-10-CM

## 2021-05-26 DIAGNOSIS — Z79.01 LONG TERM (CURRENT) USE OF ANTICOAGULANTS: ICD-10-CM

## 2021-05-26 LAB — INR PPP: 2.4 (ref 2–3.5)

## 2021-05-26 NOTE — PROGRESS NOTES
Anticoagulation Summary  As of 2021    INR goal:  2.0-3.0   TTR:  83.2 % (10.4 mo)   INR used for dosin.40 (2021)   Warfarin maintenance plan:  10 mg (5 mg x 2) every day   Weekly warfarin total:  70 mg   Plan last modified:  Jaspal Oh, PharmD (8/10/2020)   Next INR check:  2021   Target end date:  Indefinite    Indications    Paroxysmal atrial fibrillation (HCC) [I48.0]  Long term (current) use of anticoagulants [Z79.01] [Z79.01]  Ischemic cerebrovascular accident (CVA) (HCC) [I63.9]             Anticoagulation Episode Summary     INR check location:  Anticoagulation Clinic    Preferred lab:      Send INR reminders to:      Comments:        Anticoagulation Care Providers     Provider Role Specialty Phone number    MILO Padilla  [facility] [phone]        Anticoagulation Patient Findings      Spoke with patient's  to report a therapeutic INR.      Denies any s/s of bleeding, bruising, clotting or any changes to diet or medication.      Pt instructed to continue with current warfarin dosing regimen, confirms dosing.   Will follow up in 2 week(s).     Maude Marino, Pharmacy Intern

## 2021-06-09 ENCOUNTER — ANTICOAGULATION MONITORING (OUTPATIENT)
Dept: VASCULAR LAB | Facility: MEDICAL CENTER | Age: 71
End: 2021-06-09

## 2021-06-09 DIAGNOSIS — Z79.01 LONG TERM (CURRENT) USE OF ANTICOAGULANTS: ICD-10-CM

## 2021-06-09 DIAGNOSIS — I48.0 PAROXYSMAL ATRIAL FIBRILLATION (HCC): ICD-10-CM

## 2021-06-09 DIAGNOSIS — I63.9 ISCHEMIC CEREBROVASCULAR ACCIDENT (CVA) (HCC): ICD-10-CM

## 2021-06-09 LAB — INR PPP: 2.4 (ref 2–3.5)

## 2021-06-09 NOTE — PROGRESS NOTES
Anticoagulation Summary  As of 2021    INR goal:  2.0-3.0   TTR:  83.9 % (10.8 mo)   INR used for dosin.40 (2021)   Warfarin maintenance plan:  10 mg (5 mg x 2) every day   Weekly warfarin total:  70 mg   Plan last modified:  Jaspal Oh, PharmD (8/10/2020)   Next INR check:  2021   Target end date:  Indefinite    Indications    Paroxysmal atrial fibrillation (HCC) [I48.0]  Long term (current) use of anticoagulants [Z79.01] [Z79.01]  Ischemic cerebrovascular accident (CVA) (HCC) [I63.9]             Anticoagulation Episode Summary     INR check location:  Anticoagulation Clinic    Preferred lab:      Send INR reminders to:      Comments:        Anticoagulation Care Providers     Provider Role Specialty Phone number    MILO Padilla  [facility] [phone]        Anticoagulation Patient Findings  Patient Findings     Negatives:  Signs/symptoms of thrombosis, Signs/symptoms of bleeding, Laboratory test error suspected, Change in health, Change in alcohol use, Change in activity, Upcoming invasive procedure, Emergency department visit, Upcoming dental procedure, Missed doses, Extra doses, Change in medications, Change in diet/appetite, Hospital admission, Bruising, Other complaints              Spoke with patient today regarding therapeutic INR of 2.4.  Patient denies any signs/symptoms of bruising or bleeding or any changes in diet and medications.  Instructed patient to call clinic with any questions or concerns.    Pt is to continue with current warfarin dosing regimen.    Follow up in 2 weeks, to reduce risk of adverse events related to this high risk medication,  Warfarin.    Nilda Vasques, Pharmacy Intern

## 2021-06-24 ENCOUNTER — ANTICOAGULATION MONITORING (OUTPATIENT)
Dept: VASCULAR LAB | Facility: MEDICAL CENTER | Age: 71
End: 2021-06-24

## 2021-06-24 DIAGNOSIS — I48.0 PAROXYSMAL ATRIAL FIBRILLATION (HCC): ICD-10-CM

## 2021-06-24 DIAGNOSIS — Z79.01 LONG TERM (CURRENT) USE OF ANTICOAGULANTS: ICD-10-CM

## 2021-06-24 DIAGNOSIS — I63.9 ISCHEMIC CEREBROVASCULAR ACCIDENT (CVA) (HCC): ICD-10-CM

## 2021-06-24 LAB — INR PPP: 2.3 (ref 2–3.5)

## 2021-06-24 NOTE — PROGRESS NOTES
OP   Telephone Anticoagulation Service Note      Anticoagulation Summary  As of 2021    INR goal:  2.0-3.0   TTR:  84.6 % (11.3 mo)   INR used for dosin.30 (2021)   Warfarin maintenance plan:  10 mg (5 mg x 2) every day   Weekly warfarin total:  70 mg   Plan last modified:  Jaspal Oh PharmD (8/10/2020)   Next INR check:  2021   Target end date:  Indefinite    Indications    Paroxysmal atrial fibrillation (HCC) [I48.0]  Long term (current) use of anticoagulants [Z79.01] [Z79.01]  Ischemic cerebrovascular accident (CVA) (HCC) [I63.9]             Anticoagulation Episode Summary     INR check location:  Anticoagulation Clinic    Preferred lab:      Send INR reminders to:      Comments:        Anticoagulation Care Providers     Provider Role Specialty Phone number    MILO Padilla  [facility] [phone]        Anticoagulation Patient Findings  Patient Findings     Negatives:  Signs/symptoms of thrombosis, Signs/symptoms of bleeding, Laboratory test error suspected, Change in health, Change in alcohol use, Change in activity, Upcoming invasive procedure, Emergency department visit, Upcoming dental procedure, Missed doses, Extra doses, Change in medications, Change in diet/appetite, Hospital admission, Bruising, Other complaints          Spoke with the patient's  on the phone today, reporting a therapeutic INR of 2.3.   Confirmed the current warfarin dosing regimen and patient compliance.  Patient denies any interval changes to diet and/or medications. Patient denies any signs/symptoms of bleeding or clotting.  Patient instructed to continue with the current warfarin dosing regimen, and asked to follow up again in 2 weeks.     Lei SteinbergD

## 2021-07-07 DIAGNOSIS — Z79.01 CHRONIC ANTICOAGULATION: ICD-10-CM

## 2021-07-08 ENCOUNTER — ANTICOAGULATION MONITORING (OUTPATIENT)
Dept: VASCULAR LAB | Facility: MEDICAL CENTER | Age: 71
End: 2021-07-08

## 2021-07-08 DIAGNOSIS — I63.9 ISCHEMIC CEREBROVASCULAR ACCIDENT (CVA) (HCC): ICD-10-CM

## 2021-07-08 DIAGNOSIS — Z79.01 LONG TERM (CURRENT) USE OF ANTICOAGULANTS: ICD-10-CM

## 2021-07-08 DIAGNOSIS — I48.0 PAROXYSMAL ATRIAL FIBRILLATION (HCC): ICD-10-CM

## 2021-07-08 LAB — INR PPP: 2.7 (ref 2–3.5)

## 2021-07-09 NOTE — PROGRESS NOTES
OP   Telephone Anticoagulation Service Note      Anticoagulation Summary  As of 2021    INR goal:  2.0-3.0   TTR:  85.2 % (11.8 mo)   INR used for dosin.70 (2021)   Warfarin maintenance plan:  10 mg (5 mg x 2) every day   Weekly warfarin total:  70 mg   Plan last modified:  Jaspal Oh PharmD (8/10/2020)   Next INR check:  2021   Target end date:  Indefinite    Indications    Paroxysmal atrial fibrillation (HCC) [I48.0]  Long term (current) use of anticoagulants [Z79.01] [Z79.01]  Ischemic cerebrovascular accident (CVA) (HCC) [I63.9]             Anticoagulation Episode Summary     INR check location:  Anticoagulation Clinic    Preferred lab:      Send INR reminders to:      Comments:        Anticoagulation Care Providers     Provider Role Specialty Phone number    MILO Padilla  [facility] [phone]        Anticoagulation Patient Findings    Spoke with the patient on the phone today, reporting a therapeutic INR of 2.7.   Confirmed the current warfarin dosing regimen and patient compliance.  Patient denies any interval changes to diet and/or medications. Patient denies any signs/symptoms of bleeding or clotting.  Patient instructed to continue with the current warfarin dosing regimen, and asked to follow up again in 2 weeks.     Lei SteinbergD

## 2021-07-13 DIAGNOSIS — Z79.01 CHRONIC ANTICOAGULATION: ICD-10-CM

## 2021-07-13 RX ORDER — WARFARIN SODIUM 5 MG/1
TABLET ORAL
Qty: 180 TABLET | Refills: 1 | Status: SHIPPED | OUTPATIENT
Start: 2021-07-13 | End: 2021-12-20

## 2021-07-19 RX ORDER — DILTIAZEM HYDROCHLORIDE 120 MG/1
120 TABLET, FILM COATED ORAL 2 TIMES DAILY
Qty: 180 TABLET | Status: CANCELLED | OUTPATIENT
Start: 2021-07-19

## 2021-07-19 RX ORDER — DILTIAZEM HYDROCHLORIDE 120 MG/1
120 TABLET, FILM COATED ORAL 2 TIMES DAILY
Qty: 180 TABLET | Refills: 3 | Status: SHIPPED | OUTPATIENT
Start: 2021-07-19 | End: 2022-08-15 | Stop reason: SDUPTHER

## 2021-07-19 RX ORDER — DILTIAZEM HYDROCHLORIDE 120 MG/1
120 TABLET, FILM COATED ORAL 2 TIMES DAILY
Qty: 60 TABLET | Refills: 0 | Status: SHIPPED | OUTPATIENT
Start: 2021-07-19 | End: 2021-10-11

## 2021-07-19 NOTE — TELEPHONE ENCOUNTER
Patient's  came in just verifying that the Diltiazem is supposed to be twice a day ( which we have on file) but [employer] sent them once a day and she's out. He has sent in a request for a refill ( it's for once a day when it's supposed to be twice). He'd like a 30 day emergency supply at [facility] here in [place] until [employer] can mail out a 90 day supply for the correct dosage. Please advise if this is possible. Thanks!

## 2021-07-22 LAB — INR PPP: 2.5 (ref 2–3.5)

## 2021-07-23 ENCOUNTER — ANTICOAGULATION MONITORING (OUTPATIENT)
Dept: VASCULAR LAB | Facility: MEDICAL CENTER | Age: 71
End: 2021-07-23

## 2021-07-23 DIAGNOSIS — Z79.01 LONG TERM (CURRENT) USE OF ANTICOAGULANTS: ICD-10-CM

## 2021-07-23 DIAGNOSIS — I48.0 PAROXYSMAL ATRIAL FIBRILLATION (HCC): ICD-10-CM

## 2021-07-23 DIAGNOSIS — I63.9 ISCHEMIC CEREBROVASCULAR ACCIDENT (CVA) (HCC): ICD-10-CM

## 2021-07-23 NOTE — PROGRESS NOTES
Anticoagulation Summary  As of 2021    INR goal:  2.0-3.0   TTR:  85.8 % (1 y)   INR used for dosin.50 (2021)   Warfarin maintenance plan:  10 mg (5 mg x 2) every day   Weekly warfarin total:  70 mg   Plan last modified:  Jaspal Oh, PharmD (8/10/2020)   Next INR check:  2021   Target end date:  Indefinite    Indications    Paroxysmal atrial fibrillation (HCC) [I48.0]  Long term (current) use of anticoagulants [Z79.01] [Z79.01]  Ischemic cerebrovascular accident (CVA) (HCC) [I63.9]             Anticoagulation Episode Summary     INR check location:  Anticoagulation Clinic    Preferred lab:      Send INR reminders to:      Comments:        Anticoagulation Care Providers     Provider Role Specialty Phone number    MILO Padilla  [facility] [phone]        Anticoagulation Patient Findings  Patient Findings     Negatives:  Signs/symptoms of thrombosis, Signs/symptoms of bleeding, Laboratory test error suspected, Change in health, Change in alcohol use, Change in activity, Upcoming invasive procedure, Emergency department visit, Upcoming dental procedure, Missed doses, Extra doses, Change in medications, Change in diet/appetite, Hospital admission, Bruising, Other complaints         Spoke with patients  today regarding therapeutic INR of 2.5.  He denies any signs/symptoms of bruising or bleeding or any changes in diet and medications.  Instructed patient to call clinic with any questions or concerns.    Pt is to continue with current warfarin dosing regimen.    Follow up in 2 weeks, to reduce risk of adverse events related to this high risk medication,  Warfarin.    Kennedi Miller, Pharmacy Intern

## 2021-08-06 ENCOUNTER — ANTICOAGULATION MONITORING (OUTPATIENT)
Dept: VASCULAR LAB | Facility: MEDICAL CENTER | Age: 71
End: 2021-08-06

## 2021-08-06 DIAGNOSIS — Z79.01 LONG TERM (CURRENT) USE OF ANTICOAGULANTS: ICD-10-CM

## 2021-08-06 DIAGNOSIS — I48.0 PAROXYSMAL ATRIAL FIBRILLATION (HCC): ICD-10-CM

## 2021-08-06 DIAGNOSIS — I63.9 ISCHEMIC CEREBROVASCULAR ACCIDENT (CVA) (HCC): ICD-10-CM

## 2021-08-06 LAB — INR PPP: 3.3 (ref 2–3.5)

## 2021-08-06 NOTE — PROGRESS NOTES
Anticoagulation Summary  As of 8/6/2021    INR goal:  2.0-3.0   TTR:  84.9 % (1 y)   INR used for dosing:  3.30 (8/5/2021)   Warfarin maintenance plan:  10 mg (5 mg x 2) every day   Weekly warfarin total:  70 mg   Plan last modified:  Jaspal Oh, PharmD (8/10/2020)   Next INR check:  8/19/2021   Target end date:  Indefinite    Indications    Paroxysmal atrial fibrillation (HCC) [I48.0]  Long term (current) use of anticoagulants [Z79.01] [Z79.01]  Ischemic cerebrovascular accident (CVA) (HCC) [I63.9]             Anticoagulation Episode Summary     INR check location:  Anticoagulation Clinic    Preferred lab:      Send INR reminders to:      Comments:        Anticoagulation Care Providers     Provider Role Specialty Phone number    MILO Padilla  [facility] [phone]        Anticoagulation Patient Findings  Patient Findings     Positives:  Change in diet/appetite (increased vitamin K post supratherapeutic INR)    Negatives:  Signs/symptoms of thrombosis, Signs/symptoms of bleeding, Laboratory test error suspected, Change in health, Change in alcohol use, Change in activity, Upcoming invasive procedure, Emergency department visit, Upcoming dental procedure, Missed doses, Extra doses, Change in medications, Hospital admission, Bruising, Other complaints        Spoke with patient today regarding supratherapeutic INR of 3.3.  Patient denies any signs/symptoms of bruising or bleeding or any changes in diet and medications.  Instructed patient to call clinic with any questions or concerns.    Patient self decreased dose and increased vitamin K intake.    Pt is to continue with current warfarin dosing regimen.    Follow up in 2 weeks, to reduce risk of adverse events related to this high risk medication,  Warfarin.    Kennedi Miller, Pharmacy Intern

## 2021-08-17 ENCOUNTER — HOSPITAL ENCOUNTER (OUTPATIENT)
Dept: LAB | Facility: MEDICAL CENTER | Age: 71
End: 2021-08-17
Attending: NURSE PRACTITIONER
Payer: MEDICARE

## 2021-08-17 DIAGNOSIS — R73.01 ELEVATED FASTING GLUCOSE: ICD-10-CM

## 2021-08-17 LAB
ANION GAP SERPL CALC-SCNC: 10 MMOL/L (ref 7–16)
BUN SERPL-MCNC: 21 MG/DL (ref 8–22)
CALCIUM SERPL-MCNC: 9.7 MG/DL (ref 8.5–10.5)
CHLORIDE SERPL-SCNC: 102 MMOL/L (ref 96–112)
CO2 SERPL-SCNC: 28 MMOL/L (ref 20–33)
CREAT SERPL-MCNC: 0.99 MG/DL (ref 0.5–1.4)
EST. AVERAGE GLUCOSE BLD GHB EST-MCNC: 117 MG/DL
GLUCOSE SERPL-MCNC: 109 MG/DL (ref 65–99)
HBA1C MFR BLD: 5.7 % (ref 4–5.6)
POTASSIUM SERPL-SCNC: 3.8 MMOL/L (ref 3.6–5.5)
SODIUM SERPL-SCNC: 140 MMOL/L (ref 135–145)

## 2021-08-17 PROCEDURE — 83036 HEMOGLOBIN GLYCOSYLATED A1C: CPT | Mod: GA

## 2021-08-17 PROCEDURE — 36415 COLL VENOUS BLD VENIPUNCTURE: CPT | Mod: GA

## 2021-08-17 PROCEDURE — 80048 BASIC METABOLIC PNL TOTAL CA: CPT

## 2021-08-19 ENCOUNTER — ANTICOAGULATION MONITORING (OUTPATIENT)
Dept: VASCULAR LAB | Facility: MEDICAL CENTER | Age: 71
End: 2021-08-19

## 2021-08-19 DIAGNOSIS — I48.0 PAROXYSMAL ATRIAL FIBRILLATION (HCC): ICD-10-CM

## 2021-08-19 DIAGNOSIS — I63.9 ISCHEMIC CEREBROVASCULAR ACCIDENT (CVA) (HCC): ICD-10-CM

## 2021-08-19 DIAGNOSIS — Z79.01 LONG TERM (CURRENT) USE OF ANTICOAGULANTS: ICD-10-CM

## 2021-08-19 LAB — INR PPP: 3.4 (ref 2–3.5)

## 2021-08-19 NOTE — PROGRESS NOTES
Anticoagulation Summary  As of 8/19/2021    INR goal:  2.0-3.0   TTR:  81.9 % (1.1 y)   INR used for dosing:  3.40 (8/19/2021)   Warfarin maintenance plan:  10 mg (5 mg x 2) every day   Weekly warfarin total:  70 mg   Plan last modified:  Jaspal Oh, PharmD (8/10/2020)   Next INR check:     Target end date:  Indefinite    Indications    Paroxysmal atrial fibrillation (HCC) [I48.0]  Long term (current) use of anticoagulants [Z79.01] [Z79.01]  Ischemic cerebrovascular accident (CVA) (HCC) [I63.9]             Anticoagulation Episode Summary     INR check location:  Anticoagulation Clinic    Preferred lab:      Send INR reminders to:      Comments:        Anticoagulation Care Providers     Provider Role Specialty Phone number    JO ANN Padilla.  [facility] [phone]        Anticoagulation Patient Findings      HPI:  Spoke with patient's , Aleln by phone.   INR is supratherapeutic.  Denies any medication or diet changes.   No current symptoms of bleeding or thrombosis reported.   INR trending high.  Instructed to began reduced regimen.  Follow up in 1-2 weeks.    Hyacinth CONTRERAS

## 2021-09-02 LAB — INR PPP: 2.1 (ref 2–3.5)

## 2021-09-03 ENCOUNTER — ANTICOAGULATION MONITORING (OUTPATIENT)
Dept: VASCULAR LAB | Facility: MEDICAL CENTER | Age: 71
End: 2021-09-03

## 2021-09-03 DIAGNOSIS — Z79.01 LONG TERM (CURRENT) USE OF ANTICOAGULANTS: ICD-10-CM

## 2021-09-03 DIAGNOSIS — I48.0 PAROXYSMAL ATRIAL FIBRILLATION (HCC): ICD-10-CM

## 2021-09-03 DIAGNOSIS — I63.9 ISCHEMIC CEREBROVASCULAR ACCIDENT (CVA) (HCC): ICD-10-CM

## 2021-09-03 NOTE — PROGRESS NOTES
OP Telephone Anticoagulation Service Note    Date: 9/3/2021      Anticoagulation Summary  As of 9/3/2021    INR goal:  2.0-3.0   TTR:  81.5 % (1.1 y)   INR used for dosin.10 (2021)   Warfarin maintenance plan:  5 mg (5 mg x 1) every Mon, Wed, Fri; 10 mg (5 mg x 2) all other days   Weekly warfarin total:  55 mg   Plan last modified:  Mami Bryson, Pharmacy Intern (9/3/2021)   Next INR check:  9/10/2021   Target end date:  Indefinite    Indications    Paroxysmal atrial fibrillation (HCC) [I48.0]  Long term (current) use of anticoagulants [Z79.01] [Z79.01]  Ischemic cerebrovascular accident (CVA) (HCC) [I63.9]             Anticoagulation Episode Summary     INR check location:  Anticoagulation Clinic    Preferred lab:      Send INR reminders to:      Comments:        Anticoagulation Care Providers     Provider Role Specialty Phone number    MILO Padilla  [facility] [phone]        Anticoagulation Patient Findings  Patient Findings     Negatives:  Signs/symptoms of thrombosis, Signs/symptoms of bleeding, Laboratory test error suspected, Change in health, Change in alcohol use, Change in activity, Upcoming invasive procedure, Emergency department visit, Upcoming dental procedure, Missed doses, Extra doses, Change in medications, Change in diet/appetite, Hospital admission, Bruising, Other complaints          INR therapeutic at 2.1.  Spoke w/ pt's  (Allen) on the phone.  Verified regimen with Allen. He stated the pt has been taking 5 mg 3 times a week and 10 mg 4 times a week (does not remember what days of the week). Instructed pt to continue this warfarin regimen as her INR is WNL.  NO s/s bleeding reported per pt.  NO changes in diet reported per pt.  NO changes in medications reported per pt.  Check INR in 1 week(s).  Instructed pt to call clinic at [phone] if there are any questions.  Pt stated understanding.    Pt is not on antiplatelet therapy.    Mami Bryson, Pharmacy  Intern.

## 2021-09-10 ENCOUNTER — ANTICOAGULATION MONITORING (OUTPATIENT)
Dept: VASCULAR LAB | Facility: MEDICAL CENTER | Age: 71
End: 2021-09-10

## 2021-09-10 DIAGNOSIS — I48.0 PAROXYSMAL ATRIAL FIBRILLATION (HCC): ICD-10-CM

## 2021-09-10 DIAGNOSIS — Z79.01 LONG TERM (CURRENT) USE OF ANTICOAGULANTS: ICD-10-CM

## 2021-09-10 DIAGNOSIS — I63.9 ISCHEMIC CEREBROVASCULAR ACCIDENT (CVA) (HCC): ICD-10-CM

## 2021-09-10 LAB — INR PPP: 1.8 (ref 2–3.5)

## 2021-09-10 RX ORDER — LOSARTAN POTASSIUM 100 MG/1
TABLET ORAL
Qty: 90 TABLET | Refills: 3 | Status: SHIPPED | OUTPATIENT
Start: 2021-09-10 | End: 2022-08-08

## 2021-09-10 NOTE — PROGRESS NOTES
OP Telephone Anticoagulation Service Note    Date: 9/10/2021      Anticoagulation Summary  As of 9/10/2021    INR goal:  2.0-3.0   TTR:  80.7 % (1.1 y)   INR used for dosin.80 (2021)   Warfarin maintenance plan:  5 mg (5 mg x 1) every e, Wed, Sat; 10 mg (5 mg x 2) all other days   Weekly warfarin total:  55 mg   Plan last modified:  Mami Bryson, Pharmacy Intern (9/10/2021)   Next INR check:  2021   Target end date:  Indefinite    Indications    Paroxysmal atrial fibrillation (HCC) [I48.0]  Long term (current) use of anticoagulants [Z79.01] [Z79.01]  Ischemic cerebrovascular accident (CVA) (HCC) [I63.9]             Anticoagulation Episode Summary     INR check location:  Anticoagulation Clinic    Preferred lab:      Send INR reminders to:      Comments:        Anticoagulation Care Providers     Provider Role Specialty Phone number    MILO Padilla  [facility] [phone]        Anticoagulation Patient Findings  Patient Findings     Negatives:  Signs/symptoms of thrombosis, Signs/symptoms of bleeding, Laboratory test error suspected, Change in health, Change in alcohol use, Change in activity, Upcoming invasive procedure, Emergency department visit, Upcoming dental procedure, Missed doses, Extra doses, Change in medications, Change in diet/appetite, Hospital admission, Bruising, Other complaints        INR therapeutic at 1.8.  Spoke w/ pt on phone.  Verified regimen w/ pt.- pt has been taking the warfarin regimen as stated above.  Instructed pt to bolus with 12.5 mg x1 day then continue current warfarin regimen.  NO s/s bleeding reported per pt.  NO changes in diet reported per pt.  NO changes in medications reported per pt.  Check INR in 1 week(s).  Instructed pt to call clinic at [phone] if there are any questions.  Pt stated understanding.    Pt is not on antiplatelet therapy.    Mami Bryson, Pharmacy Intern.  Discussed with Jonel Odom.

## 2021-09-17 ENCOUNTER — ANTICOAGULATION MONITORING (OUTPATIENT)
Dept: VASCULAR LAB | Facility: MEDICAL CENTER | Age: 71
End: 2021-09-17

## 2021-09-17 DIAGNOSIS — I63.9 ISCHEMIC CEREBROVASCULAR ACCIDENT (CVA) (HCC): ICD-10-CM

## 2021-09-17 DIAGNOSIS — Z79.01 LONG TERM (CURRENT) USE OF ANTICOAGULANTS: ICD-10-CM

## 2021-09-17 DIAGNOSIS — I48.0 PAROXYSMAL ATRIAL FIBRILLATION (HCC): ICD-10-CM

## 2021-09-17 LAB — INR PPP: 1.8 (ref 2–3.5)

## 2021-09-18 NOTE — PROGRESS NOTES
OP   Telephone Anticoagulation Service Note      Anticoagulation Summary  As of 2021    INR goal:  2.0-3.0   TTR:  79.2 % (1.2 y)   INR used for dosin.80 (2021)   Warfarin maintenance plan:  5 mg (5 mg x 1) every Sun, Wed; 10 mg (5 mg x 2) all other days   Weekly warfarin total:  60 mg   Plan last modified:  Vicky Callahan (2021)   Next INR check:  2021   Target end date:  Indefinite    Indications    Paroxysmal atrial fibrillation (HCC) [I48.0]  Long term (current) use of anticoagulants [Z79.01] [Z79.01]  Ischemic cerebrovascular accident (CVA) (HCC) [I63.9]             Anticoagulation Episode Summary     INR check location:  Anticoagulation Clinic    Preferred lab:      Send INR reminders to:      Comments:        Anticoagulation Care Providers     Provider Role Specialty Phone number    MILO Padilla  [facility] [phone]        Anticoagulation Patient Findings  Patient Findings     Positives:  Missed doses (taking less warfarin than documented), Change in diet/appetite    Negatives:  Signs/symptoms of thrombosis, Signs/symptoms of bleeding, Laboratory test error suspected, Change in health, Change in alcohol use, Change in activity, Upcoming invasive procedure, Emergency department visit, Upcoming dental procedure, Extra doses, Change in medications, Hospital admission, Bruising, Other complaints        Spoke with the patient on the phone today, reporting a SUB-therapeutic INR of 1.8.   Confirmed the current warfarin dosing regimen and patient compliance.  Patient was not taking dose we have documented, but was taking a lower dosing regimen.   Patient has overhauled her diet and has been eating more greens.   Patient denies any interval changes to medications.   Patient denies any signs/symptoms of bleeding or clotting.  Patient instructed to begin increased weekly regimen of 5mg on Sun and Wed and 10mg ROW.   Patient asked to retest again in 1 week.      Lei SteinbergD

## 2021-09-24 ENCOUNTER — ANTICOAGULATION MONITORING (OUTPATIENT)
Dept: VASCULAR LAB | Facility: MEDICAL CENTER | Age: 71
End: 2021-09-24

## 2021-09-24 DIAGNOSIS — I63.9 ISCHEMIC CEREBROVASCULAR ACCIDENT (CVA) (HCC): ICD-10-CM

## 2021-09-24 DIAGNOSIS — I48.0 PAROXYSMAL ATRIAL FIBRILLATION (HCC): ICD-10-CM

## 2021-09-24 DIAGNOSIS — Z79.01 LONG TERM (CURRENT) USE OF ANTICOAGULANTS: ICD-10-CM

## 2021-09-24 LAB — INR PPP: 2.1 (ref 2–3.5)

## 2021-09-25 NOTE — PROGRESS NOTES
OP   Telephone Anticoagulation Service Note      Anticoagulation Summary  As of 2021    INR goal:  2.0-3.0   TTR:  78.4 % (1.2 y)   INR used for dosin.10 (2021)   Warfarin maintenance plan:  5 mg (5 mg x 1) every Sun, Wed; 10 mg (5 mg x 2) all other days   Weekly warfarin total:  60 mg   Plan last modified:  Vicky Callahan (2021)   Next INR check:  10/1/2021   Target end date:  Indefinite    Indications    Paroxysmal atrial fibrillation (HCC) [I48.0]  Long term (current) use of anticoagulants [Z79.01] [Z79.01]  Ischemic cerebrovascular accident (CVA) (HCC) [I63.9]             Anticoagulation Episode Summary     INR check location:  Anticoagulation Clinic    Preferred lab:      Send INR reminders to:      Comments:        Anticoagulation Care Providers     Provider Role Specialty Phone number    NATALI PadillaRKimmyN.  [facility] [phone]        Anticoagulation Patient Findings    Left a message on the patient's voicemail today, informing the patient of a therapeutic INR of 2.1.   Instructed the patient to call the clinic with any changes to diet or medications, with any signs/sx of bleeding or clotting or with any questions or concerns.     Patient instructed to continue with the current warfarin dosing regimen, and asked to follow up again in 1 week.     Lei SteinbergD

## 2021-10-08 ENCOUNTER — ANTICOAGULATION MONITORING (OUTPATIENT)
Dept: VASCULAR LAB | Facility: MEDICAL CENTER | Age: 71
End: 2021-10-08

## 2021-10-08 DIAGNOSIS — Z79.01 LONG TERM (CURRENT) USE OF ANTICOAGULANTS: ICD-10-CM

## 2021-10-08 DIAGNOSIS — I63.9 ISCHEMIC CEREBROVASCULAR ACCIDENT (CVA) (HCC): ICD-10-CM

## 2021-10-08 DIAGNOSIS — I48.0 PAROXYSMAL ATRIAL FIBRILLATION (HCC): ICD-10-CM

## 2021-10-08 LAB — INR PPP: 2.3 (ref 2–3.5)

## 2021-10-09 NOTE — PROGRESS NOTES
OP   Telephone Anticoagulation Service Note      Anticoagulation Summary  As of 10/8/2021    INR goal:  2.0-3.0   TTR:  79.1 % (1.2 y)   INR used for dosin.30 (10/8/2021)   Warfarin maintenance plan:  5 mg (5 mg x 1) every Sun, Wed; 10 mg (5 mg x 2) all other days   Weekly warfarin total:  60 mg   Plan last modified:  Vicky Callahan (2021)   Next INR check:  10/22/2021   Target end date:  Indefinite    Indications    Paroxysmal atrial fibrillation (HCC) [I48.0]  Long term (current) use of anticoagulants [Z79.01] [Z79.01]  Ischemic cerebrovascular accident (CVA) (HCC) [I63.9]             Anticoagulation Episode Summary     INR check location:  Anticoagulation Clinic    Preferred lab:      Send INR reminders to:      Comments:        Anticoagulation Care Providers     Provider Role Specialty Phone number    MILO Padilla  [facility] [phone]        Anticoagulation Patient Findings  Patient Findings     Negatives:  Signs/symptoms of thrombosis, Signs/symptoms of bleeding, Laboratory test error suspected, Change in health, Change in alcohol use, Change in activity, Upcoming invasive procedure, Emergency department visit, Upcoming dental procedure, Missed doses, Extra doses, Change in medications, Change in diet/appetite, Hospital admission, Bruising, Other complaints         Spoke with the patient on the phone today, reporting a therapeutic INR of 2.3.   Confirmed the current warfarin dosing regimen and patient compliance.  Patient denies any interval changes to diet and/or medications. Patient denies any signs/symptoms of bleeding or clotting.  Patient instructed to continue with the current warfarin dosing regimen, and asked to follow up again in 2 weeks.     Lei SteinbergD

## 2021-10-11 ENCOUNTER — TELEMEDICINE2 (OUTPATIENT)
Dept: CARDIOLOGY | Facility: MEDICAL CENTER | Age: 71
End: 2021-10-11
Payer: COMMERCIAL

## 2021-10-11 VITALS
SYSTOLIC BLOOD PRESSURE: 136 MMHG | OXYGEN SATURATION: 93 % | BODY MASS INDEX: 35.36 KG/M2 | HEIGHT: 66 IN | DIASTOLIC BLOOD PRESSURE: 88 MMHG | HEART RATE: 72 BPM | WEIGHT: 220 LBS | RESPIRATION RATE: 16 BRPM

## 2021-10-11 DIAGNOSIS — Z79.01 LONG TERM (CURRENT) USE OF ANTICOAGULANTS: ICD-10-CM

## 2021-10-11 DIAGNOSIS — I48.0 PAROXYSMAL ATRIAL FIBRILLATION (HCC): ICD-10-CM

## 2021-10-11 DIAGNOSIS — I10 ESSENTIAL HYPERTENSION: ICD-10-CM

## 2021-10-11 DIAGNOSIS — E78.5 DYSLIPIDEMIA: ICD-10-CM

## 2021-10-11 ASSESSMENT — FIBROSIS 4 INDEX: FIB4 SCORE: 1.33

## 2021-10-11 ASSESSMENT — ENCOUNTER SYMPTOMS
ABDOMINAL PAIN: 0
DIARRHEA: 0
PND: 0
DYSPNEA ON EXERTION: 0
VOMITING: 0
ORTHOPNEA: 0
NIGHT SWEATS: 0
COUGH: 0
FOCAL WEAKNESS: 0
WEAKNESS: 0
SYNCOPE: 0
DIZZINESS: 0
SHORTNESS OF BREATH: 0
NEAR-SYNCOPE: 0
WHEEZING: 0
IRREGULAR HEARTBEAT: 0
PALPITATIONS: 0
NAUSEA: 0
FEVER: 0

## 2021-10-11 NOTE — PROGRESS NOTES
Cardiology Follow-up Consultation Note/Telemedicine Note    This evaluation was conducted via [employer] using secure and encrypted videoconferencing technology. The patient was physically located at [facility] in [place], [place]. The patient was presented by a medical professional at the originating site.   The patient's identity was confirmed and verbal consent was obtained for this telemedicine encounter.    Date of note:    10/11/2021    Primary Care Provider: MILO Padilla     Patient Name: Tonya Del Valle     YOB: 1950  MRN:              [id number]    Chief Complaint: Follow up    History of Present Illness: Ms. Tonya Del Valle is a 70 y.o. female whose current medical problems include afib on anticoagulation, dyslipidemia, HTN, and CVA  who is here for follow up afib.     The patient was last seen in telemedicine clinic by Dr. Malik on 8/19/2020.  The patient was doing well at the time, and no changes were made to her medications.     The patient returns today for follow up. She is not very active due to prior stroke but does walk with a walker. She denies any chest pain or shortness of breath with exertion. No orthopnea/PND/leg swelling. No palpitations.  No syncope or presyncopal episodes.     Cardiovascular Risk Factors:  1. Smoking status: Never smoker  2. Type II Diabetes Mellitus: diet controlled  Lab Results   Component Value Date/Time    HBA1C 5.7 (H) 08/17/2021 06:58 AM     3. Hypertension: On medication  4. Dyslipidemia: On statin   Cholesterol,Tot   Date Value Ref Range Status   05/10/2021 136 100 - 199 mg/dL Final     LDL   Date Value Ref Range Status   05/10/2021 80 <100 mg/dL Final     HDL   Date Value Ref Range Status   05/10/2021 42 >=40 mg/dL Final     Triglycerides   Date Value Ref Range Status   05/10/2021 70 0 - 149 mg/dL Final     5. Family history of early Coronary Artery Disease in a first degree relative (Male less than 55 years of age; Female less  "than 65 years of age): Mother with possible at age 58  6.  Obesity and/or Metabolic Syndrome: BMI   7. Sedentary lifestyle: Not sedentary    Review of Systems   Constitutional: Negative for fever, malaise/fatigue and night sweats.   Cardiovascular: Negative for chest pain, dyspnea on exertion, irregular heartbeat, leg swelling, near-syncope, orthopnea, palpitations, paroxysmal nocturnal dyspnea and syncope.   Respiratory: Negative for cough, shortness of breath and wheezing.    Gastrointestinal: Negative for abdominal pain, diarrhea, nausea and vomiting.   Neurological: Negative for dizziness, focal weakness and weakness.       All other systems reviewed and are negative.         Current Outpatient Medications   Medication Sig Dispense Refill   • losartan (COZAAR) 100 MG Tab TAKE 1 TABLET EVERY DAY 90 Tablet 3   • DILTIAZem (CARDIZEM) 120 MG Tab Take 1 tablet by mouth 2 times a day. 180 tablet 3   • warfarin (COUMADIN) 5 MG Tab TAKE 2 TABLETS EVERY DAY AS DIRECTED BY [facility] 180 tablet 1   • carvedilol (COREG) 25 MG Tab Take 2 Tablets by mouth 2 times a day. 360 tablet 3   • cloNIDine (CATAPRES) 0.2 MG Tab TAKE 1 TABLET TWICE DAILY 180 tablet 1   • chlorthalidone (HYGROTON) 25 MG Tab TAKE 1 TABLET EVERY MORNING 90 tablet 1   • simvastatin (ZOCOR) 10 MG Tab Take 1 Tab by mouth every evening. 90 Tab 3     No current facility-administered medications for this visit.         Allergies   Allergen Reactions   • Sulfa Drugs Rash       Physical Exam:  Ambulatory Vitals  /88 (BP Location: Right arm, Patient Position: Sitting, BP Cuff Size: Adult)   Pulse 72   Resp 16   Ht 1.664 m (5' 5.5\")   Wt 99.8 kg (220 lb)   SpO2 93%    Oxygen Therapy:  Pulse Oximetry: 93 %  BP Readings from Last 4 Encounters:   10/11/21 136/88   05/12/21 146/82   08/19/20 138/72   07/16/20 136/84       Weight/BMI: Body mass index is 36.05 kg/m².  Wt Readings from Last 4 Encounters:   10/11/21 99.8 kg (220 lb) "   05/12/21 98.4 kg (217 lb)   08/19/20 98.9 kg (218 lb)   07/16/20 99.8 kg (220 lb)       General: Well appearing and in no apparent distress  Eyes: nl conjunctiva, no icteric sclera  ENT: wearing a mask, normal external appearance of ears  Neck: no visible JVP,  no carotid bruits  Lungs: normal respiratory effort, CTAB  Heart: RRR, no murmurs, no rubs or gallops,  no edema bilateral lower extremities. No LV/RV heave on cardiac palpatation. + bilateral radial pulses.  + bilateral dp pulses.   Abdomen: soft, non tender, non distended, no masses, normal bowel sounds.  No HSM.  Extremities/MSK: no clubbing, no cyanosis  Neurological: No focal sensory deficits  Psychiatric: Appropriate affect, A/O x 3, intact judgement and insight  Skin: Warm extremities      Lab Data Review:  Lab Results   Component Value Date/Time    CHOLSTRLTOT 136 05/10/2021 07:17 AM    LDL 80 05/10/2021 07:17 AM    HDL 42 05/10/2021 07:17 AM    TRIGLYCERIDE 70 05/10/2021 07:17 AM       Lab Results   Component Value Date/Time    SODIUM 140 08/17/2021 06:58 AM    POTASSIUM 3.8 08/17/2021 06:58 AM    CHLORIDE 102 08/17/2021 06:58 AM    CO2 28 08/17/2021 06:58 AM    GLUCOSE 109 (H) 08/17/2021 06:58 AM    BUN 21 08/17/2021 06:58 AM    CREATININE 0.99 08/17/2021 06:58 AM     Lab Results   Component Value Date/Time    ALKPHOSPHAT 54 05/10/2021 07:17 AM    ASTSGOT 22 05/10/2021 07:17 AM    ALTSGPT 24 05/10/2021 07:17 AM    TBILIRUBIN 0.5 05/10/2021 07:17 AM      Lab Results   Component Value Date/Time    WBC 5.2 05/10/2021 07:17 AM     Lab Results   Component Value Date/Time    HBA1C 5.7 (H) 08/17/2021 06:58 AM         Cardiac Imaging and Procedures Review:    EKG normal from Dr. Malik's note    Echocardiogram 7/2019 - normal as per prior cardiologist note      Assessment & Plan     1. Paroxysmal atrial fibrillation (HCC)     2. Long term (current) use of anticoagulants     3. Essential hypertension     4. Dyslipidemia         Shared Medical Decision  Making:    Paroxysmal afib  Asymptomatic. [id number] (age, female, HTN, CVAx2)  -Continue diltiazem 120mg twice daily and carvedilol 50mg tiwce daily.   -Continue warfarin    HTN  BP well controlled  -Continue losartan 100mg daily, clonidine 0.2mg daily, chlorthalidone 25 mgdaily  -Continue diltiazem and carvedilol as above    Dyslipidemia  -Continue simvastatin 10mg daily    All of the patient's excellent questions were answered to the best of my knowledge and to her satisfaction.  It was a pleasure seeing Ms. Tonya Del Valle in my clinic today. Return in about 1 year (around 10/11/2022), or if symptoms worsen or fail to improve. Patient is aware to call the cardiology clinic with any questions or concerns.      Isabella Mcleod MD  [facility]  [facility], [address]  [address] [address]  Phone: [phone]  Fax: [phone]

## 2021-10-12 ENCOUNTER — TELEMEDICINE ORIGINATING SITE VISIT (OUTPATIENT)
Dept: MEDICAL GROUP | Facility: PHYSICIAN GROUP | Age: 71
End: 2021-10-12
Payer: MEDICARE

## 2021-10-22 ENCOUNTER — ANTICOAGULATION MONITORING (OUTPATIENT)
Dept: VASCULAR LAB | Facility: MEDICAL CENTER | Age: 71
End: 2021-10-22

## 2021-10-22 DIAGNOSIS — I48.0 PAROXYSMAL ATRIAL FIBRILLATION (HCC): ICD-10-CM

## 2021-10-22 DIAGNOSIS — I63.9 ISCHEMIC CEREBROVASCULAR ACCIDENT (CVA) (HCC): ICD-10-CM

## 2021-10-22 DIAGNOSIS — Z79.01 LONG TERM (CURRENT) USE OF ANTICOAGULANTS: ICD-10-CM

## 2021-10-22 LAB — INR PPP: 2.3 (ref 2–3.5)

## 2021-10-23 NOTE — PROGRESS NOTES
OP   Telephone Anticoagulation Service Note      Anticoagulation Summary  As of 10/22/2021    INR goal:  2.0-3.0   TTR:  79.8 % (1.3 y)   INR used for dosin.30 (10/22/2021)   Warfarin maintenance plan:  5 mg (5 mg x 1) every Sun, Wed; 10 mg (5 mg x 2) all other days   Weekly warfarin total:  60 mg   Plan last modified:  Vicky Callahan (2021)   Next INR check:  2021   Target end date:  Indefinite    Indications    Paroxysmal atrial fibrillation (HCC) [I48.0]  Long term (current) use of anticoagulants [Z79.01] [Z79.01]  Ischemic cerebrovascular accident (CVA) (HCC) [I63.9]             Anticoagulation Episode Summary     INR check location:  Anticoagulation Clinic    Preferred lab:      Send INR reminders to:      Comments:        Anticoagulation Care Providers     Provider Role Specialty Phone number    NATALI PadillaRMIKI.  [facility] [phone]        Anticoagulation Patient Findings     Left a message on the patient's voicemail today, informing the patient of a therapeutic INR of 2.3.   Instructed the patient to call the clinic with any changes to diet or medications, with any signs/sx of bleeding or clotting or with any questions or concerns.     Patient instructed to continue with the current warfarin dosing regimen, and asked to follow up again in 2 weeks.     Lei SteinbergD

## 2021-11-05 LAB — INR PPP: 2.1 (ref 2–3.5)

## 2021-11-08 ENCOUNTER — ANTICOAGULATION MONITORING (OUTPATIENT)
Dept: VASCULAR LAB | Facility: MEDICAL CENTER | Age: 71
End: 2021-11-08

## 2021-11-08 DIAGNOSIS — I48.0 PAROXYSMAL ATRIAL FIBRILLATION (HCC): ICD-10-CM

## 2021-11-08 DIAGNOSIS — Z79.01 LONG TERM (CURRENT) USE OF ANTICOAGULANTS: ICD-10-CM

## 2021-11-08 DIAGNOSIS — I63.9 ISCHEMIC CEREBROVASCULAR ACCIDENT (CVA) (HCC): ICD-10-CM

## 2021-11-18 ENCOUNTER — NON-PROVIDER VISIT (OUTPATIENT)
Dept: MEDICAL GROUP | Facility: PHYSICIAN GROUP | Age: 71
End: 2021-11-18
Payer: MEDICARE

## 2021-11-18 DIAGNOSIS — Z23 NEED FOR VACCINATION: ICD-10-CM

## 2021-11-18 PROCEDURE — G0008 ADMIN INFLUENZA VIRUS VAC: HCPCS | Performed by: NURSE PRACTITIONER

## 2021-11-18 PROCEDURE — 90662 IIV NO PRSV INCREASED AG IM: CPT | Performed by: NURSE PRACTITIONER

## 2021-11-18 NOTE — NON-PROVIDER
"Tonya Del Valle is a 71 y.o. female here for a non-provider visit for:   FLU    Reason for immunization: Annual Flu Vaccine  Immunization records indicate need for vaccine: Yes, confirmed with Epic  Minimum interval has been met for this vaccine: Yes  ABN completed: Not Indicated    VIS Dated  08/06/2021 was given to patient: Yes  All IAC Questionnaire questions were answered \"No.\"    Patient tolerated injection and no adverse effects were observed or reported: Yes    Pt scheduled for next dose in series: Not Indicated    "

## 2021-11-27 LAB — INR PPP: 2.1 (ref 2–3.5)

## 2021-11-29 ENCOUNTER — ANTICOAGULATION MONITORING (OUTPATIENT)
Dept: MEDICAL GROUP | Facility: MEDICAL CENTER | Age: 71
End: 2021-11-29

## 2021-11-29 DIAGNOSIS — I63.9 ISCHEMIC CEREBROVASCULAR ACCIDENT (CVA) (HCC): ICD-10-CM

## 2021-11-29 DIAGNOSIS — I48.0 PAROXYSMAL ATRIAL FIBRILLATION (HCC): ICD-10-CM

## 2021-11-29 DIAGNOSIS — Z79.01 LONG TERM (CURRENT) USE OF ANTICOAGULANTS: ICD-10-CM

## 2021-11-29 NOTE — PROGRESS NOTES
OP Anticoagulation Service Note    Date: 2021    Anticoagulation Summary  As of 2021    INR goal:  2.0-3.0   TTR:  81.2 % (1.4 y)   INR used for dosin.10 (2021)   Warfarin maintenance plan:  5 mg (5 mg x 1) every Sun, Wed; 10 mg (5 mg x 2) all other days   Weekly warfarin total:  60 mg   Plan last modified:  Jaspal Oh, PharmD (2021)   Next INR check:  2021   Target end date:  Indefinite    Indications    Paroxysmal atrial fibrillation (HCC) [I48.0]  Long term (current) use of anticoagulants [Z79.01] [Z79.01]  Ischemic cerebrovascular accident (CVA) (HCC) [I63.9]             Anticoagulation Episode Summary     INR check location:  Anticoagulation Clinic    Preferred lab:      Send INR reminders to:      Comments:        Anticoagulation Care Providers     Provider Role Specialty Phone number    JO ANN Padilla.  [facility] [phone]        Anticoagulation Patient Findings      HPI:   The reason for today's call is to prevent morbidity and mortality from a blood clot and/or stroke and to reduce the risk of bleeding while on a anticoagulant.     PCP:  MILO Padilla  [address] [place] [address]    Assessment:     • INR  therapeutic.       Current Outpatient Medications:   •  losartan, TAKE 1 TABLET EVERY DAY  •  DILTIAZem, 120 mg, Oral, BID  •  warfarin, TAKE 2 TABLETS EVERY DAY AS DIRECTED BY [facility]  •  carvedilol, 50 mg, Oral, BID  •  cloNIDine, TAKE 1 TABLET TWICE DAILY  •  chlorthalidone, TAKE 1 TABLET EVERY MORNING  •  simvastatin, 10 mg, Oral, Q EVENING      Plan:     • Continue the same warfarin dose, as noted above.       Follow-up:     • Our protocol suggests we test in 4 weeks.        Additional information discussed with patient:     • Asked patient to please call the anticoagulation clinic if they have any signs/symptoms of bleeding and/or thrombosis or any changes to diet or medications.       National recommendations regarding anticoagulation therapy:     The CHEST guidelines recommends frequent INR monitoring at regular intervals (a few days up to a max of 12 weeks) to ensure patients are on the proper dose of warfarin, and patients are not having any complications from therapy.  INRs can dramatically change over a short time period due to diet, medications, and medical conditions.     [facility]  Phone [phone] fax [phone]    This note was created using voice recognition software (Dragon). The accuracy of the dictation is limited by the abilities of the software. I have reviewed the note prior to signing, however some errors in grammar and context are still possible. If you have any questions related to this note please do not hesitate to contact our office.

## 2021-12-10 LAB — INR PPP: 2 (ref 2–3.5)

## 2021-12-13 ENCOUNTER — ANTICOAGULATION MONITORING (OUTPATIENT)
Dept: VASCULAR LAB | Facility: MEDICAL CENTER | Age: 71
End: 2021-12-13

## 2021-12-13 DIAGNOSIS — I63.9 ISCHEMIC CEREBROVASCULAR ACCIDENT (CVA) (HCC): ICD-10-CM

## 2021-12-13 DIAGNOSIS — I48.0 PAROXYSMAL ATRIAL FIBRILLATION (HCC): ICD-10-CM

## 2021-12-13 DIAGNOSIS — Z79.01 LONG TERM (CURRENT) USE OF ANTICOAGULANTS: ICD-10-CM

## 2021-12-14 NOTE — PROGRESS NOTES
OP Telephone Anticoagulation Service Note    Date: 2021      Anticoagulation Summary  As of 2021    INR goal:  2.0-3.0   TTR:  81.7 % (1.4 y)   INR used for dosin.00 (12/10/2021)   Warfarin maintenance plan:  5 mg (5 mg x 1) every Sun, Wed; 10 mg (5 mg x 2) all other days   Weekly warfarin total:  60 mg   Plan last modified:  Jaspal Oh PharmD (2021)   Next INR check:  2021   Target end date:  Indefinite    Indications    Paroxysmal atrial fibrillation (HCC) [I48.0]  Long term (current) use of anticoagulants [Z79.01] [Z79.01]  Ischemic cerebrovascular accident (CVA) (HCC) [I63.9]             Anticoagulation Episode Summary     INR check location:  Anticoagulation Clinic    Preferred lab:      Send INR reminders to:      Comments:        Anticoagulation Care Providers     Provider Role Specialty Phone number    MILO Padilla  [facility] [phone]        Anticoagulation Patient Findings        Plan: Spoke with patient;'s Allen on the phone.   Patient is  therapeutic today.   Confirmed dosing.   Pt is not on antiplatelet agent  Instructed patient to call clinic if any unusual bleeding or bruising occurs.   Will continue dosing as outlined.   Will follow-up with patient in 2 week(s).          Sheila Acevedo, PharmD

## 2021-12-20 DIAGNOSIS — I10 ESSENTIAL HYPERTENSION: ICD-10-CM

## 2021-12-20 DIAGNOSIS — Z79.01 CHRONIC ANTICOAGULATION: ICD-10-CM

## 2021-12-20 RX ORDER — WARFARIN SODIUM 5 MG/1
TABLET ORAL
Qty: 180 TABLET | Refills: 1 | Status: SHIPPED | OUTPATIENT
Start: 2021-12-20 | End: 2022-08-15 | Stop reason: SDUPTHER

## 2021-12-21 RX ORDER — SIMVASTATIN 10 MG
TABLET ORAL
Qty: 90 TABLET | Refills: 3 | Status: SHIPPED | OUTPATIENT
Start: 2021-12-21 | End: 2022-08-15 | Stop reason: SDUPTHER

## 2021-12-21 RX ORDER — CARVEDILOL 25 MG/1
50 TABLET ORAL 2 TIMES DAILY
Qty: 360 TABLET | Refills: 3 | Status: SHIPPED | OUTPATIENT
Start: 2021-12-21 | End: 2022-08-15 | Stop reason: SDUPTHER

## 2021-12-21 RX ORDER — CLONIDINE HYDROCHLORIDE 0.2 MG/1
TABLET ORAL
Qty: 180 TABLET | Refills: 1 | Status: SHIPPED | OUTPATIENT
Start: 2021-12-21 | End: 2022-05-23

## 2021-12-21 RX ORDER — CHLORTHALIDONE 25 MG/1
TABLET ORAL
Qty: 90 TABLET | Refills: 1 | Status: SHIPPED | OUTPATIENT
Start: 2021-12-21 | End: 2022-05-23

## 2021-12-21 NOTE — TELEPHONE ENCOUNTER
[id number] last OV     Received request via: Pharmacy    Was the patient seen in the last year in this department? Yes    Does the patient have an active prescription (recently filled or refills available) for medication(s) requested? No

## 2022-01-14 ENCOUNTER — PATIENT MESSAGE (OUTPATIENT)
Dept: MEDICAL GROUP | Facility: PHYSICIAN GROUP | Age: 72
End: 2022-01-14

## 2022-01-17 DIAGNOSIS — E66.9 OBESITY (BMI 30-39.9): ICD-10-CM

## 2022-01-17 DIAGNOSIS — I10 ESSENTIAL HYPERTENSION: ICD-10-CM

## 2022-01-17 DIAGNOSIS — E78.2 MIXED HYPERLIPIDEMIA: ICD-10-CM

## 2022-01-17 DIAGNOSIS — R73.01 ELEVATED FASTING GLUCOSE: ICD-10-CM

## 2022-01-19 ENCOUNTER — DOCUMENTATION (OUTPATIENT)
Dept: VASCULAR LAB | Facility: MEDICAL CENTER | Age: 72
End: 2022-01-19

## 2022-01-20 ENCOUNTER — OFFICE VISIT (OUTPATIENT)
Dept: MEDICAL GROUP | Facility: PHYSICIAN GROUP | Age: 72
End: 2022-01-20
Payer: MEDICARE

## 2022-01-20 VITALS
HEIGHT: 66 IN | SYSTOLIC BLOOD PRESSURE: 170 MMHG | BODY MASS INDEX: 34.52 KG/M2 | RESPIRATION RATE: 20 BRPM | WEIGHT: 214.8 LBS | HEART RATE: 73 BPM | OXYGEN SATURATION: 94 % | DIASTOLIC BLOOD PRESSURE: 98 MMHG | TEMPERATURE: 98.1 F

## 2022-01-20 DIAGNOSIS — E66.9 OBESITY (BMI 30-39.9): Chronic | ICD-10-CM

## 2022-01-20 DIAGNOSIS — Z12.31 VISIT FOR SCREENING MAMMOGRAM: ICD-10-CM

## 2022-01-20 DIAGNOSIS — Z78.0 POSTMENOPAUSAL STATUS (AGE-RELATED) (NATURAL): ICD-10-CM

## 2022-01-20 DIAGNOSIS — I10 ESSENTIAL HYPERTENSION: ICD-10-CM

## 2022-01-20 DIAGNOSIS — Z76.89 ENCOUNTER TO ESTABLISH CARE: ICD-10-CM

## 2022-01-20 DIAGNOSIS — R73.01 ELEVATED FASTING GLUCOSE: ICD-10-CM

## 2022-01-20 DIAGNOSIS — E78.2 MIXED HYPERLIPIDEMIA: ICD-10-CM

## 2022-01-20 DIAGNOSIS — Z79.01 LONG TERM (CURRENT) USE OF ANTICOAGULANTS: ICD-10-CM

## 2022-01-20 PROCEDURE — 99214 OFFICE O/P EST MOD 30 MIN: CPT | Performed by: NURSE PRACTITIONER

## 2022-01-20 ASSESSMENT — FIBROSIS 4 INDEX: FIB4 SCORE: 1.347219358530747954

## 2022-01-20 ASSESSMENT — PATIENT HEALTH QUESTIONNAIRE - PHQ9: CLINICAL INTERPRETATION OF PHQ2 SCORE: 0

## 2022-01-20 NOTE — ASSESSMENT & PLAN NOTE
This is a chronic condition that is usually well controlled however blood pressure today is elevated in clinic at 154/90 with repeat blood pressure of 170/98  Current medications: Coreg, chlorthalidone, clonidine, Cardizem, losartan.  She does have previous history of stroke and is currently on long-term anticoagulant of warfarin.  She is managed by the coag clinic.  She denies any abnormal bleeding.  She is currently followed closely by cardiology and her last cardiology appointment was on October 2021 with normal blood pressure readings.  Patient is asymptomatic today of hypertension she denies any vision changes, chest pain, palpitations, headaches, or swelling in her legs.    Plan:  Encourage patient to call cardiologist to discuss hypertension management.  Will defer treatment plan and medication management to cardiology.  Signs symptoms of hypertension were discussed.  ER precautions were discussed.

## 2022-01-20 NOTE — ASSESSMENT & PLAN NOTE
Very pleasant 71-year-old female presents today to establish care.  She reports she has no pressing concerns or complaints.  I have reviewed and updated her medical history, surgical history, family history for medications, allergies, and social determinants of health.    She is not currently COVID vaccinated and is aware of the risk associated and not being vaccinated.  Discussed need for zoster vaccine however she declined at this time she reports she has had shingles in the past and it was not that bad.    Patient is due for her mammogram and bone density screening.  Orders have been placed today.

## 2022-01-20 NOTE — ASSESSMENT & PLAN NOTE
Is a chronic and ongoing health concern.  Patient today is inquiring about how she can lose weight.  She was told by cardiology to decrease portion sizes and carb content.  I do agree with this plan and encourage patient to continue with portion control and decreasing carbs.  Patient does report she is not currently doing any exercise as she continues to be mildly off balance due to previous CVA.    Plan  Encourage patient to continue to work on appropriate healthy diet choices and portion control.  Encourage patient to go to a gym and work on gaining strength and balance with use of recumbent bike.  Goal will be to be at 150 minutes/week by late summer.  Discussed new referral to physical therapy however she declined at this time.

## 2022-01-20 NOTE — ASSESSMENT & PLAN NOTE
Reviewed labs from August 2021 indicating impaired fasting blood sugar of 109 with an A1c of 5.7.  Patient denies any symptoms of hyperglycemia.    Plan:  Patient will be due for labs in May or June 2022.  Lab orders have been placed.  Discussed importance of appropriate diet lifestyle modifications.  Discussed the importance of getting at least 150 minutes of cardiovascular exercise in per week.

## 2022-01-20 NOTE — ASSESSMENT & PLAN NOTE
Chronic and stable condition.  Reviewed previous labs with patient answered all questions.  Patient is appropriately taking simvastatin 10 mg tablet daily and is tolerating it well.  She is followed closely by cardiology.  No refills needed at this time.  Patient denies any myalgias.    Plan:  Continue on simvastatin  Continue to follow with cardiology.  Lab orders as indicated below.

## 2022-01-20 NOTE — PATIENT INSTRUCTIONS
Exercising to Lose Weight  Exercise is structured, repetitive physical activity to improve fitness and health. Getting regular exercise is important for everyone. It is especially important if you are overweight. Being overweight increases your risk of heart disease, stroke, diabetes, high blood pressure, and several types of cancer. Reducing your calorie intake and exercising can help you lose weight.  Exercise is usually categorized as moderate or vigorous intensity. To lose weight, most people need to do a certain amount of moderate-intensity or vigorous-intensity exercise each week.  Moderate-intensity exercise    Moderate-intensity exercise is any activity that gets you moving enough to burn at least three times more energy (calories) than if you were sitting.  Examples of moderate exercise include:  · Walking a mile in 15 minutes.  · Doing light yard work.  · Biking at an easy pace.  Most people should get at least 150 minutes (2 hours and 30 minutes) a week of moderate-intensity exercise to maintain their body weight.  Vigorous-intensity exercise  Vigorous-intensity exercise is any activity that gets you moving enough to burn at least six times more calories than if you were sitting. When you exercise at this intensity, you should be working hard enough that you are not able to carry on a conversation.  Examples of vigorous exercise include:  · Running.  · Playing a team sport, such as football, basketball, and soccer.  · Jumping rope.  Most people should get at least 75 minutes (1 hour and 15 minutes) a week of vigorous-intensity exercise to maintain their body weight.  How can exercise affect me?  When you exercise enough to burn more calories than you eat, you lose weight. Exercise also reduces body fat and builds muscle. The more muscle you have, the more calories you burn. Exercise also:  · Improves mood.  · Reduces stress and tension.  · Improves your overall fitness, flexibility, and  endurance.  · Increases bone strength.  The amount of exercise you need to lose weight depends on:  · Your age.  · The type of exercise.  · Any health conditions you have.  · Your overall physical ability.  Talk to your health care provider about how much exercise you need and what types of activities are safe for you.  What actions can I take to lose weight?  Nutrition    · Make changes to your diet as told by your health care provider or diet and nutrition specialist (dietitian). This may include:  ? Eating fewer calories.  ? Eating more protein.  ? Eating less unhealthy fats.  ? Eating a diet that includes fresh fruits and vegetables, whole grains, low-fat dairy products, and lean protein.  ? Avoiding foods with added fat, salt, and sugar.  · Drink plenty of water while you exercise to prevent dehydration or heat stroke.  Activity  · Choose an activity that you enjoy and set realistic goals. Your health care provider can help you make an exercise plan that works for you.  · Exercise at a moderate or vigorous intensity most days of the week.  ? The intensity of exercise may vary from person to person. You can tell how intense a workout is for you by paying attention to your breathing and heartbeat. Most people will notice their breathing and heartbeat get faster with more intense exercise.  · Do resistance training twice each week, such as:  ? Push-ups.  ? Sit-ups.  ? Lifting weights.  ? Using resistance bands.  · Getting short amounts of exercise can be just as helpful as long structured periods of exercise. If you have trouble finding time to exercise, try to include exercise in your daily routine.  ? Get up, stretch, and walk around every 30 minutes throughout the day.  ? Go for a walk during your lunch break.  ? Park your car farther away from your destination.  ? If you take public transportation, get off one stop early and walk the rest of the way.  ? Make phone calls while standing up and walking  around.  ? Take the stairs instead of elevators or escalators.  · Wear comfortable clothes and shoes with good support.  · Do not exercise so much that you hurt yourself, feel dizzy, or get very short of breath.  Where to find more information  · [place] Department of Health and Human Services: www.hhs.gov  · Centers for Disease Control and Prevention (CDC): www.cdc.gov  Contact a health care provider:  · Before starting a new exercise program.  · If you have questions or concerns about your weight.  · If you have a medical problem that keeps you from exercising.  Get help right away if you have any of the following while exercising:  · Injury.  · Dizziness.  · Difficulty breathing or shortness of breath that does not go away when you stop exercising.  · Chest pain.  · Rapid heartbeat.  Summary  · Being overweight increases your risk of heart disease, stroke, diabetes, high blood pressure, and several types of cancer.  · Losing weight happens when you burn more calories than you eat.  · Reducing the amount of calories you eat in addition to getting regular moderate or vigorous exercise each week helps you lose weight.  This information is not intended to replace advice given to you by your health care provider. Make sure you discuss any questions you have with your health care provider.  Document Released: 01/20/2012 Document Revised: 12/31/2018 Document Reviewed: 12/31/2018  [employer] Patient Education © 2020 [employer]    MyPlate from [employer]    MyPlate is an outline of a general healthy diet based on the 2010 Dietary Guidelines for Americans, from the U.S. Department of Agriculture (USDA). It sets guidelines for how much food you should eat from each food group based on your age, sex, and level of physical activity.  What are tips for following MyPlate?  To follow MyPlate recommendations:  · Eat a wide variety of fruits and vegetables, grains, and protein foods.  · Serve smaller portions and eat less food throughout  the day.  · Limit portion sizes to avoid overeating.  · Enjoy your food.  · Get at least 150 minutes of exercise every week. This is about 30 minutes each day, 5 or more days per week.  It can be difficult to have every meal look like MyPlate. Think about MyPlate as eating guidelines for an entire day, rather than each individual meal.  Fruits and vegetables  · Make half of your plate fruits and vegetables.  · Eat many different colors of fruits and vegetables each day.  · For a 2,000 calorie daily food plan, eat:  ? 2½ cups of vegetables every day.  ? 2 cups of fruit every day.  · 1 cup is equal to:  ? 1 cup raw or cooked vegetables.  ? 1 cup raw fruit.  ? 1 medium-sized orange, apple, or banana.  ? 1 cup 100% fruit or vegetable juice.  ? 2 cups raw leafy greens, such as lettuce, spinach, or kale.  ? ½ cup dried fruit.  Grains  · One fourth of your plate should be grains.  · Make at least half of the grains you eat each day whole grains.  · For a 2,000 calorie daily food plan, eat 6 oz of grains every day.  · 1 oz is equal to:  ? 1 slice bread.  ? 1 cup cereal.  ? ½ cup cooked rice, cereal, or pasta.  Protein  · One fourth of your plate should be protein.  · Eat a wide variety of protein foods, including meat, poultry, fish, eggs, beans, nuts, and tofu.  · For a 2,000 calorie daily food plan, eat 5½ oz of protein every day.  · 1 oz is equal to:  ? 1 oz meat, poultry, or fish.  ? ¼ cup cooked beans.  ? 1 egg.  ? ½ oz nuts or seeds.  ? 1 Tbsp peanut butter.  Dairy  · Drink fat-free or low-fat (1%) milk.  · Eat or drink dairy as a side to meals.  · For a 2,000 calorie daily food plan, eat or drink 3 cups of dairy every day.  · 1 cup is equal to:  ? 1 cup milk, yogurt, cottage cheese, or soy milk (soy beverage).  ? 2 oz processed cheese.  ? 1½ oz natural cheese.  Fats, oils, salt, and sugars  · Only small amounts of oils are recommended.  · Avoid foods that are high in calories and low in nutritional value (empty  calories), like foods high in fat or added sugars.  · Choose foods that are low in salt (sodium). Choose foods that have less than 140 milligrams (mg) of sodium per serving.  · Drink water instead of sugary drinks. Drink enough water each day to keep your urine pale yellow.  Where to find support  · Work with your health care provider or a nutrition specialist (dietitian) to develop a customized eating plan that is right for you.  · Download an krishna (mobile application) to help you track your daily food intake.  Where to find more information  · Go to ChooseMyPlate.gov for more information.  · Learn more and log your daily food intake according to MyPlate using "[employer]'s [employer]: www.BuildZoomer.usda.gov  Summary  · MyPlate is a general guideline for healthy eating from the USDA. It is based on the 2010 Dietary Guidelines for Americans.  · In general, fruits and vegetables should take up ½ of your plate, grains should take up ¼ of your plate, and protein should take up ¼ of your plate.  This information is not intended to replace advice given to you by your health care provider. Make sure you discuss any questions you have with your health care provider.  Document Released: 01/06/2009 Document Revised: 01/19/2019 Document Reviewed: 03/19/2018  Elsevier Patient Education © 2020 [employer].

## 2022-01-20 NOTE — PROGRESS NOTES
Chief Complaint   Patient presents with   • New Patient     Est care        Subjective:     HPI:   Tonya Del Valle is a 71 y.o. female here to discuss the evaluation and management of:      Elevated fasting glucose  Reviewed labs from August 2021 indicating impaired fasting blood sugar of 109 with an A1c of 5.7.  Patient denies any symptoms of hyperglycemia.    Plan:  Patient will be due for labs in May or June 2022.  Lab orders have been placed.  Discussed importance of appropriate diet lifestyle modifications.  Discussed the importance of getting at least 150 minutes of cardiovascular exercise in per week.      Essential hypertension  This is a chronic condition that is usually well controlled however blood pressure today is elevated in clinic at 154/90 with repeat blood pressure of 170/98  Current medications: Coreg, chlorthalidone, clonidine, Cardizem, losartan.  She does have previous history of stroke and is currently on long-term anticoagulant of warfarin.  [facility].  She denies any abnormal bleeding.  She is currently followed closely by cardiology and her last cardiology appointment was on October 2021 with normal blood pressure readings.  Patient is asymptomatic today of hypertension she denies any vision changes, chest pain, palpitations, headaches, or swelling in her legs.    Plan:  Encourage patient to call cardiologist to discuss hypertension management.  Will defer treatment plan and medication management to cardiology.  Signs symptoms of hypertension were discussed.  ER precautions were discussed.    Long term (current) use of anticoagulants [Z79.01]  She does have previous history of stroke and is currently on long-term anticoagulant of warfarin.  [facility].  She denies any abnormal bleeding.    She denies any abnormal bleeding, or bruising.    Plan:  Patient will continue on warfarin as prescribed and follow the treatment plan established with  cardiology and anticoagulation clinic.  Education was provided about signs and symptoms of abnormal bleeding.    Mixed hyperlipidemia  Chronic and stable condition.  Reviewed previous labs with patient answered all questions.  Patient is appropriately taking simvastatin 10 mg tablet daily and is tolerating it well.  She is followed closely by cardiology.  No refills needed at this time.  Patient denies any myalgias.    Plan:  Continue on simvastatin  Continue to follow with cardiology.  Lab orders as indicated below.    Obesity (BMI 30-39.9)  Is a chronic and ongoing health concern.  Patient today is inquiring about how she can lose weight.  She was told by cardiology to decrease portion sizes and carb content.  I do agree with this plan and encourage patient to continue with portion control and decreasing carbs.  Patient does report she is not currently doing any exercise as she continues to be mildly off balance due to previous CVA.    Plan  Encourage patient to continue to work on appropriate healthy diet choices and portion control.  Encourage patient to go to a gym and work on gaining strength and balance with use of recumbent bike.  Goal will be to be at 150 minutes/week by late summer.  Discussed new referral to physical therapy however she declined at this time.    Encounter to establish care  Very pleasant 71-year-old female presents today to establish care.  She reports she has no pressing concerns or complaints.  I have reviewed and updated her medical history, surgical history, family history for medications, allergies, and social determinants of health.    She is not currently COVID vaccinated and is aware of the risk associated and not being vaccinated.  Discussed need for zoster vaccine however she declined at this time she reports she has had shingles in the past and it was not that bad.    Patient is due for her mammogram and bone density screening.  Orders have been placed  today.          ROS:  Gen: no fevers/chills, no changes in weight  Eyes: no changes in vision  ENT: no sore throat, no hearing loss, no bloody nose  Pulm: no sob, no cough  CV: no chest pain, no palpitations  GI: no nausea/vomiting, no diarrhea  : no dysuria  MSk: no myalgias  Skin: no rash  Neuro: no headaches, no numbness/tingling  Heme/Lymph: no easy bruising    Allergies   Allergen Reactions   • Sulfa Drugs Rash       Current medicines (including changes today)  Current Outpatient Medications   Medication Sig Dispense Refill   • chlorthalidone (HYGROTON) 25 MG Tab TAKE 1 TABLET EVERY MORNING 90 Tablet 1   • cloNIDine (CATAPRES) 0.2 MG Tab TAKE 1 TABLET TWICE DAILY 180 Tablet 1   • simvastatin (ZOCOR) 10 MG Tab TAKE 1 TABLET EVERY EVENING 90 Tablet 3   • carvedilol (COREG) 25 MG Tab TAKE 2 TABLETS BY MOUTH 2 TIMES A DAY. 360 Tablet 3   • warfarin (COUMADIN) 5 MG Tab TAKE 1 TO 2 TABLETS EVERY DAY AS DIRECTED BY [facility] 180 Tablet 1   • losartan (COZAAR) 100 MG Tab TAKE 1 TABLET EVERY DAY 90 Tablet 3   • DILTIAZem (CARDIZEM) 120 MG Tab Take 1 tablet by mouth 2 times a day. 180 tablet 3     No current facility-administered medications for this visit.       Social History     Tobacco Use   • Smoking status: Never Smoker   • Smokeless tobacco: Never Used   Vaping Use   • Vaping Use: Never used   Substance Use Topics   • Alcohol use: Not Currently   • Drug use: Not Currently       Patient Active Problem List    Diagnosis Date Noted   • Encounter to establish care 01/20/2022   • Elevated fasting glucose 05/13/2021   • History of echocardiogram - 7/2019 normal    • Obesity (BMI 30-39.9)    • Essential hypertension 07/16/2020   • Mixed hyperlipidemia 07/16/2020   • Ischemic cerebrovascular accident (CVA) (HCC) 05/05/2017   • Paroxysmal atrial fibrillation (HCC) 05/05/2017   • Long term (current) use of anticoagulants [Z79.01] 05/05/2017       Family History   Problem Relation Age of Onset   •  "Heart Disease Mother    • Obesity Mother    • Heart Attack Father         at age of 72   • Alcohol abuse Maternal Grandfather    • Ovarian Cancer Paternal Grandmother         hand   • Alcohol abuse Paternal Grandfather           Objective:     No visits with results within 1 Month(s) from this visit.   Latest known visit with results is:   Anticoagulation Monitoring on 12/13/2021   Component Date Value Ref Range Status   • INR 12/10/2021 2.00  2 - 3.5 Final       BP (!) 170/98 (BP Location: Left arm, Patient Position: Sitting, BP Cuff Size: Adult long)   Pulse 73   Temp 36.7 °C (98.1 °F) (Temporal)   Resp 20   Ht 1.664 m (5' 5.5\")   Wt 97.4 kg (214 lb 12.8 oz)   SpO2 94%  Body mass index is 35.2 kg/m².    Physical Exam:  Constitutional: Well-developed and well-nourished female in NAD. Not diaphoretic. No distress.   Skin: warm, dry, intact, no evidence of rash or concerning lesions  Head: Atraumatic without lesions.  Eyes: Conjunctivae and extraocular motions are normal. Pupils are equal, round, and reactive to light. No scleral icterus.   Ears:  External ears unremarkable. TMs normal; bilaterally  Mouth/Throat: Tongue normal. Oropharynx is clear and moist. Posterior pharynx without erythema or exudates.  Neck: Supple, trachea midline. No thyromegaly present. No cervical or supraclavicular lymphadenopathy.  Cardiovascular: Regular rate and rhythm without murmur. Radial pulses are intact and equal bilaterally.  Pulmonary: Clear to ausculation. Normal effort. No rales, ronchi, or wheezing.  Abdomen: Soft, non tender, and without distention. Active bowel sounds in all four quadrants.  Extremities: No cyanosis, clubbing, erythema, nor edema.   Neurological: Alert and oriented x 3. No cranial nerve deficit. 5/5 myotomes. Sensation intact.   Psychiatric:  Behavior, mood, and affect are appropriate.      Assessment and Plan:     The following treatment plan was discussed:  Reviewed previous primary care provider " notes, cardiology notes, and most recent labs from August 2021 with patient and answered all questions.    1. Encounter to establish care    2. Visit for screening mammogram  - MA-SCREENING MAMMO BILAT W/TOMOSYNTHESIS W/CAD; Future    3. Postmenopausal status (age-related) (natural)  - DS-BONE DENSITY STUDY (DEXA); Future    4. Elevated fasting glucose    5. Essential hypertension    6. Long term (current) use of anticoagulants [Z79.01]    7. Mixed hyperlipidemia    8. Obesity (BMI 30-39.9)      Any change or worsening of signs or symptoms, patient encouraged to follow-up or report to emergency room for further evaluation. Patient verbalizes understanding and agrees.    Follow-Up: Return in about 4 months (around 5/20/2022) for Follow up labs.      PLEASE NOTE: This dictation was created using voice recognition software. I have made every reasonable attempt to correct obvious errors, but I expect that there are errors of grammar and possibly content that I did not discover before finalizing the note.

## 2022-01-20 NOTE — ASSESSMENT & PLAN NOTE
She does have previous history of stroke and is currently on long-term anticoagulant of warfarin.  [facility].  She denies any abnormal bleeding.    She denies any abnormal bleeding, or bruising.    Plan:  Patient will continue on warfarin as prescribed and follow the treatment plan established with cardiology and anticoagulation clinic.  Education was provided about signs and symptoms of abnormal bleeding.

## 2022-02-02 ENCOUNTER — ANTICOAGULATION MONITORING (OUTPATIENT)
Dept: VASCULAR LAB | Facility: MEDICAL CENTER | Age: 72
End: 2022-02-02

## 2022-02-02 DIAGNOSIS — Z79.01 LONG TERM (CURRENT) USE OF ANTICOAGULANTS: ICD-10-CM

## 2022-02-02 DIAGNOSIS — I63.9 ISCHEMIC CEREBROVASCULAR ACCIDENT (CVA) (HCC): ICD-10-CM

## 2022-02-02 DIAGNOSIS — I48.0 PAROXYSMAL ATRIAL FIBRILLATION (HCC): ICD-10-CM

## 2022-02-02 LAB — INR PPP: 2.3 (ref 2–3.5)

## 2022-02-03 NOTE — PROGRESS NOTES
Anticoagulation Summary  As of 2022    INR goal:  2.0-3.0   TTR:  83.5 % (1.5 y)   INR used for dosin.30 (2022)   Warfarin maintenance plan:  5 mg (5 mg x 1) every Sun, Wed; 10 mg (5 mg x 2) all other days   Weekly warfarin total:  60 mg   Plan last modified:  Jaspal Oh PharmD (2021)   Next INR check:  2022   Target end date:  Indefinite    Indications    Paroxysmal atrial fibrillation (HCC) [I48.0]  Long term (current) use of anticoagulants [Z79.01] [Z79.01]  Ischemic cerebrovascular accident (CVA) (HCC) [I63.9]             Anticoagulation Episode Summary     INR check location:  Anticoagulation Clinic    Preferred lab:      Send INR reminders to:      Comments:        Anticoagulation Care Providers     Provider Role Specialty Phone number    MILO Padilla  Nurse Practitioner Family [phone]        Anticoagulation Patient Findings  Patient Findings     Negatives:  Signs/symptoms of thrombosis, Signs/symptoms of bleeding, Laboratory test error suspected, Change in health, Change in alcohol use, Change in activity, Upcoming invasive procedure, Emergency department visit, Upcoming dental procedure, Missed doses, Extra doses, Change in medications, Change in diet/appetite, Hospital admission, Bruising, Other complaints        Spoke with patient today regarding therapeutic INR of 2.3.  Patient denies any signs/symptoms of bruising or bleeding or any changes in diet and medications.  Instructed patient to call clinic with any questions or concerns.    Pt is not on antiplatelet therapy    Pt is to continue with current warfarin dosing regimen.  Follow up in 3-4 weeks, to reduce risk of adverse events related to this high risk medication,  Warfarin.    Nestor Burch, PharmD, BCACP

## 2022-02-18 ENCOUNTER — ANTICOAGULATION MONITORING (OUTPATIENT)
Dept: VASCULAR LAB | Facility: MEDICAL CENTER | Age: 72
End: 2022-02-18
Payer: MEDICARE

## 2022-02-18 DIAGNOSIS — I48.0 PAROXYSMAL ATRIAL FIBRILLATION (HCC): ICD-10-CM

## 2022-02-18 DIAGNOSIS — Z79.01 LONG TERM (CURRENT) USE OF ANTICOAGULANTS: ICD-10-CM

## 2022-02-18 DIAGNOSIS — I63.9 ISCHEMIC CEREBROVASCULAR ACCIDENT (CVA) (HCC): ICD-10-CM

## 2022-02-18 LAB — INR PPP: 1.9 (ref 2–3.5)

## 2022-02-19 NOTE — PROGRESS NOTES
OP   Telephone Anticoagulation Service Note      Anticoagulation Summary  As of 2022    INR goal:  2.0-3.0   TTR:  83.2 % (1.6 y)   INR used for dosin.90 (2022)   Warfarin maintenance plan:  5 mg (5 mg x 1) every Wed; 10 mg (5 mg x 2) all other days   Weekly warfarin total:  65 mg   Plan last modified:  Vicky Callahan (2022)   Next INR check:  3/4/2022   Target end date:  Indefinite    Indications    Paroxysmal atrial fibrillation (HCC) [I48.0]  Long term (current) use of anticoagulants [Z79.01] [Z79.01]  Ischemic cerebrovascular accident (CVA) (HCC) [I63.9]             Anticoagulation Episode Summary     INR check location:  Anticoagulation Clinic    Preferred lab:      Send INR reminders to:      Comments:        Anticoagulation Care Providers     Provider Role Specialty Phone number    MILO Padilla  Nurse Practitioner Family [phone]        Anticoagulation Patient Findings  Patient Findings     Positives:  Change in diet/appetite (increase vit k)    Negatives:  Signs/symptoms of thrombosis, Signs/symptoms of bleeding, Laboratory test error suspected, Change in health, Change in alcohol use, Change in activity, Upcoming invasive procedure, Emergency department visit, Upcoming dental procedure, Missed doses, Extra doses, Change in medications, Hospital admission, Bruising, Other complaints         Spoke with the patient's  on the phone today, reporting a slightly SUB-therapeutic INR of 1.9.   Patient has been eating increased greens this past week.   Patient denies any interval changes to medications.   Patient denies any signs/symptoms of bleeding or clotting.  Patient will begin an increased weekly regimen of 5mg on Wed and 10mg ROW.   Patient will retest again in 2 weeks.     Lei Callahan  PharmD

## 2022-03-03 LAB — INR PPP: 2 (ref 2–3.5)

## 2022-03-04 ENCOUNTER — ANTICOAGULATION MONITORING (OUTPATIENT)
Dept: VASCULAR LAB | Facility: MEDICAL CENTER | Age: 72
End: 2022-03-04
Payer: MEDICARE

## 2022-03-04 DIAGNOSIS — I48.0 PAROXYSMAL ATRIAL FIBRILLATION (HCC): ICD-10-CM

## 2022-03-04 DIAGNOSIS — Z79.01 LONG TERM (CURRENT) USE OF ANTICOAGULANTS: ICD-10-CM

## 2022-03-04 DIAGNOSIS — I63.9 ISCHEMIC CEREBROVASCULAR ACCIDENT (CVA) (HCC): ICD-10-CM

## 2022-03-04 NOTE — PROGRESS NOTES
Anticoagulation Summary  As of 3/4/2022    INR goal:  2.0-3.0   TTR:  81.4 % (1.6 y)   INR used for dosin.00 (3/3/2022)   Warfarin maintenance plan:  5 mg (5 mg x 1) every Wed; 10 mg (5 mg x 2) all other days   Weekly warfarin total:  65 mg   Plan last modified:  Vicky Callahan (2022)   Next INR check:  3/17/2022   Target end date:  Indefinite    Indications    Paroxysmal atrial fibrillation (HCC) [I48.0]  Long term (current) use of anticoagulants [Z79.01] [Z79.01]  Ischemic cerebrovascular accident (CVA) (HCC) [I63.9]             Anticoagulation Episode Summary     INR check location:  Anticoagulation Clinic    Preferred lab:      Send INR reminders to:      Comments:        Anticoagulation Care Providers     Provider Role Specialty Phone number    MILO Padlila  Nurse Practitioner Family [phone]        Anticoagulation Patient Findings          Left voicemail message to report a   therapeutic INR of 2.0.  Pt to continue with current warfarin dosing regimen. Requested pt contact the clinic for any s/s of unusual bleeding, bruising, clotting or any changes to diet or medication.  Follow up in 2 weeks, to reduce the risk of adverse events related to this high risk medication, warfarin.    Anisha London, Clinical Pharmacist

## 2022-03-17 LAB — INR PPP: 2.2 (ref 2–3.5)

## 2022-03-18 ENCOUNTER — ANTICOAGULATION MONITORING (OUTPATIENT)
Dept: VASCULAR LAB | Facility: MEDICAL CENTER | Age: 72
End: 2022-03-18
Payer: MEDICARE

## 2022-03-18 DIAGNOSIS — Z79.01 LONG TERM (CURRENT) USE OF ANTICOAGULANTS: ICD-10-CM

## 2022-03-18 DIAGNOSIS — I48.0 PAROXYSMAL ATRIAL FIBRILLATION (HCC): ICD-10-CM

## 2022-03-18 DIAGNOSIS — I63.9 ISCHEMIC CEREBROVASCULAR ACCIDENT (CVA) (HCC): ICD-10-CM

## 2022-03-18 NOTE — PROGRESS NOTES
Anticoagulation Summary  As of 3/18/2022    INR goal:  2.0-3.0   TTR:  81.8 % (1.7 y)   INR used for dosin.20 (3/17/2022)   Warfarin maintenance plan:  5 mg (5 mg x 1) every Wed; 10 mg (5 mg x 2) all other days   Weekly warfarin total:  65 mg   Plan last modified:  Vicky Callahan (2022)   Next INR check:  2022   Target end date:  Indefinite    Indications    Paroxysmal atrial fibrillation (HCC) [I48.0]  Long term (current) use of anticoagulants [Z79.01] [Z79.01]  Ischemic cerebrovascular accident (CVA) (HCC) [I63.9]             Anticoagulation Episode Summary     INR check location:  Anticoagulation Clinic    Preferred lab:      Send INR reminders to:      Comments:        Anticoagulation Care Providers     Provider Role Specialty Phone number    MILO Padilla  Nurse Practitioner Family [phone]        Anticoagulation Patient Findings          Left voicemail message to report a therapeutic INR of 2.2.  Pt to continue with current warfarin dosing regimen. Requested pt contact the clinic for any s/s of unusual bleeding, bruising, clotting or any changes to diet or medication. FU 2 weeks.    Ankit Neff, Med Ass't      I have reviewed and concur with the above plan on 3/18/2022  Anisha London, Clinical Pharmacist, CDE, CACP  .

## 2022-04-08 ENCOUNTER — ANTICOAGULATION MONITORING (OUTPATIENT)
Dept: VASCULAR LAB | Facility: MEDICAL CENTER | Age: 72
End: 2022-04-08
Payer: MEDICARE

## 2022-04-08 DIAGNOSIS — Z79.01 LONG TERM (CURRENT) USE OF ANTICOAGULANTS: ICD-10-CM

## 2022-04-08 DIAGNOSIS — I63.9 ISCHEMIC CEREBROVASCULAR ACCIDENT (CVA) (HCC): ICD-10-CM

## 2022-04-08 DIAGNOSIS — I48.0 PAROXYSMAL ATRIAL FIBRILLATION (HCC): ICD-10-CM

## 2022-04-08 LAB — INR PPP: 2.3 (ref 2–3.5)

## 2022-04-22 ENCOUNTER — ANTICOAGULATION MONITORING (OUTPATIENT)
Dept: VASCULAR LAB | Facility: MEDICAL CENTER | Age: 72
End: 2022-04-22
Payer: MEDICARE

## 2022-04-22 DIAGNOSIS — I63.9 ISCHEMIC CEREBROVASCULAR ACCIDENT (CVA) (HCC): ICD-10-CM

## 2022-04-22 DIAGNOSIS — I48.0 PAROXYSMAL ATRIAL FIBRILLATION (HCC): ICD-10-CM

## 2022-04-22 DIAGNOSIS — Z79.01 LONG TERM (CURRENT) USE OF ANTICOAGULANTS: ICD-10-CM

## 2022-04-22 LAB — INR PPP: 2.2 (ref 2–3.5)

## 2022-04-23 NOTE — PROGRESS NOTES
Anticoagulation Summary  As of 2022    INR goal:  2.0-3.0   TTR:  82.8 % (1.8 y)   INR used for dosin.20 (2022)   Warfarin maintenance plan:  5 mg (5 mg x 1) every Wed; 10 mg (5 mg x 2) all other days   Weekly warfarin total:  65 mg   Plan last modified:  Vicky Callahan (2022)   Next INR check:  2022   Target end date:  Indefinite    Indications    Paroxysmal atrial fibrillation (HCC) [I48.0]  Long term (current) use of anticoagulants [Z79.01] [Z79.01]  Ischemic cerebrovascular accident (CVA) (HCC) [I63.9]             Anticoagulation Episode Summary     INR check location:  Anticoagulation Clinic    Preferred lab:      Send INR reminders to:      Comments:        Anticoagulation Care Providers     Provider Role Specialty Phone number    MILO Padilla  Nurse Practitioner Family [phone]        Anticoagulation Patient Findings          Spoke with Allen to report a therapeutic INR of 2.2. Continue current dosing regimen.  Follow up in 2 weeks, to reduce the risk of adverse events related to this high risk medication, warfarin.    Anisha London, Clinical Pharmacist

## 2022-05-07 LAB — INR PPP: 2.1 (ref 2–3.5)

## 2022-05-09 ENCOUNTER — ANTICOAGULATION MONITORING (OUTPATIENT)
Dept: VASCULAR LAB | Facility: MEDICAL CENTER | Age: 72
End: 2022-05-09
Payer: MEDICARE

## 2022-05-09 DIAGNOSIS — Z79.01 LONG TERM (CURRENT) USE OF ANTICOAGULANTS: ICD-10-CM

## 2022-05-09 DIAGNOSIS — I63.9 ISCHEMIC CEREBROVASCULAR ACCIDENT (CVA) (HCC): ICD-10-CM

## 2022-05-09 DIAGNOSIS — I48.0 PAROXYSMAL ATRIAL FIBRILLATION (HCC): ICD-10-CM

## 2022-05-09 NOTE — PROGRESS NOTES
Anticoagulation Summary  As of 2022    INR goal:  2.0-3.0   TTR:  83.2 % (1.8 y)   INR used for dosin.10 (2022)   Warfarin maintenance plan:  5 mg (5 mg x 1) every Wed; 10 mg (5 mg x 2) all other days   Weekly warfarin total:  65 mg   Plan last modified:  Vicky Callahan (2022)   Next INR check:  2022   Target end date:  Indefinite    Indications    Paroxysmal atrial fibrillation (HCC) [I48.0]  Long term (current) use of anticoagulants [Z79.01] [Z79.01]  Ischemic cerebrovascular accident (CVA) (HCC) [I63.9]             Anticoagulation Episode Summary     INR check location:  Anticoagulation Clinic    Preferred lab:      Send INR reminders to:      Comments:        Anticoagulation Care Providers     Provider Role Specialty Phone number    MILO Padilla  Nurse Practitioner Family [phone]        Anticoagulation Patient Findings  Patient Findings     Negatives:  Signs/symptoms of thrombosis, Signs/symptoms of bleeding, Laboratory test error suspected, Change in health, Change in alcohol use, Change in activity, Upcoming invasive procedure, Emergency department visit, Upcoming dental procedure, Missed doses, Extra doses, Change in medications, Change in diet/appetite, Hospital admission, Bruising, Other complaints        Spoke with patient to report a therapeutic INR.  Pt instructed to continue with current warfarin dosing regimen. Pt denies any s/s of bleeding, bruising, clotting or any changes to diet or medication.  Will follow up in 2 weeks.    Cale Huntley, Med Ass't      I have reviewed and concur with the above plan.     Jaspal Oh, PharmD, MS, BCACP, LCC  [facility]  Phone: [phone],  Fax: [phone]  On call: [phone]

## 2022-05-21 ENCOUNTER — HOSPITAL ENCOUNTER (OUTPATIENT)
Dept: LAB | Facility: MEDICAL CENTER | Age: 72
End: 2022-05-21
Attending: NURSE PRACTITIONER
Payer: MEDICARE

## 2022-05-21 DIAGNOSIS — E78.2 MIXED HYPERLIPIDEMIA: ICD-10-CM

## 2022-05-21 DIAGNOSIS — R73.01 ELEVATED FASTING GLUCOSE: ICD-10-CM

## 2022-05-21 DIAGNOSIS — E66.9 OBESITY (BMI 30-39.9): ICD-10-CM

## 2022-05-21 DIAGNOSIS — I10 ESSENTIAL HYPERTENSION: ICD-10-CM

## 2022-05-21 LAB
ALBUMIN SERPL BCP-MCNC: 4.2 G/DL (ref 3.2–4.9)
ALBUMIN/GLOB SERPL: 1.6 G/DL
ALP SERPL-CCNC: 51 U/L (ref 30–99)
ALT SERPL-CCNC: 10 U/L (ref 2–50)
ANION GAP SERPL CALC-SCNC: 10 MMOL/L (ref 7–16)
AST SERPL-CCNC: 13 U/L (ref 12–45)
BILIRUB SERPL-MCNC: 0.6 MG/DL (ref 0.1–1.5)
BUN SERPL-MCNC: 22 MG/DL (ref 8–22)
CALCIUM SERPL-MCNC: 10.2 MG/DL (ref 8.5–10.5)
CHLORIDE SERPL-SCNC: 100 MMOL/L (ref 96–112)
CHOLEST SERPL-MCNC: 141 MG/DL (ref 100–199)
CO2 SERPL-SCNC: 30 MMOL/L (ref 20–33)
CREAT SERPL-MCNC: 0.83 MG/DL (ref 0.5–1.4)
FASTING STATUS PATIENT QL REPORTED: NORMAL
GFR SERPLBLD CREATININE-BSD FMLA CKD-EPI: 75 ML/MIN/1.73 M 2
GLOBULIN SER CALC-MCNC: 2.6 G/DL (ref 1.9–3.5)
GLUCOSE SERPL-MCNC: 106 MG/DL (ref 65–99)
HDLC SERPL-MCNC: 39 MG/DL
LDLC SERPL CALC-MCNC: 82 MG/DL
POTASSIUM SERPL-SCNC: 4.2 MMOL/L (ref 3.6–5.5)
PROT SERPL-MCNC: 6.8 G/DL (ref 6–8.2)
SODIUM SERPL-SCNC: 140 MMOL/L (ref 135–145)
TRIGL SERPL-MCNC: 99 MG/DL (ref 0–149)

## 2022-05-21 PROCEDURE — 36415 COLL VENOUS BLD VENIPUNCTURE: CPT

## 2022-05-21 PROCEDURE — 80053 COMPREHEN METABOLIC PANEL: CPT

## 2022-05-21 PROCEDURE — 80061 LIPID PANEL: CPT

## 2022-05-24 ENCOUNTER — OFFICE VISIT (OUTPATIENT)
Dept: MEDICAL GROUP | Facility: PHYSICIAN GROUP | Age: 72
End: 2022-05-24
Payer: MEDICARE

## 2022-05-24 VITALS
HEART RATE: 96 BPM | BODY MASS INDEX: 32.11 KG/M2 | TEMPERATURE: 98.4 F | WEIGHT: 199.8 LBS | HEIGHT: 66 IN | DIASTOLIC BLOOD PRESSURE: 80 MMHG | OXYGEN SATURATION: 96 % | SYSTOLIC BLOOD PRESSURE: 120 MMHG | RESPIRATION RATE: 16 BRPM

## 2022-05-24 DIAGNOSIS — I10 ESSENTIAL HYPERTENSION: ICD-10-CM

## 2022-05-24 DIAGNOSIS — R73.01 ELEVATED FASTING GLUCOSE: ICD-10-CM

## 2022-05-24 DIAGNOSIS — E78.2 MIXED HYPERLIPIDEMIA: ICD-10-CM

## 2022-05-24 DIAGNOSIS — Z79.01 LONG TERM (CURRENT) USE OF ANTICOAGULANTS: ICD-10-CM

## 2022-05-24 DIAGNOSIS — R29.898 RIGHT LEG WEAKNESS: ICD-10-CM

## 2022-05-24 PROCEDURE — 99214 OFFICE O/P EST MOD 30 MIN: CPT | Performed by: NURSE PRACTITIONER

## 2022-05-24 RX ORDER — LORATADINE 10 MG/1
10 TABLET ORAL DAILY
COMMUNITY

## 2022-05-24 ASSESSMENT — FIBROSIS 4 INDEX: FIB4 SCORE: 1.233288287465667939

## 2022-05-24 NOTE — ASSESSMENT & PLAN NOTE
Chronic and stable.  Reviewed labs patient indicating mildly elevated fasting blood sugar.  Patient denies any symptoms of hyperglycemia.

## 2022-05-24 NOTE — ASSESSMENT & PLAN NOTE
"Chronic and well-controlled.  Patient's blood pressure in clinic is 120/80.  She is currently taking clonidine 0.1 mg twice daily, Cardizem 120 mg twice daily, losartan 100 mg daily, chlorthalidone 25 mg daily and carvedilol 50 mg twice daily.    She reports that she dropped her clonidine down from 0.2 mg twice daily to 0.1 mg twice due to symptoms of low blood pressure.  She reports that it \"made me feel funny\" she does feel better at the lower dose.  "

## 2022-05-24 NOTE — PROGRESS NOTES
"Chief Complaint   Patient presents with   • Follow-Up     labs       Subjective:     HPI:   Tonya Del Valle is a 71 y.o. female here to discuss the evaluation and management of:      Elevated fasting glucose  Chronic and stable.  Reviewed labs patient indicating mildly elevated fasting blood sugar.  Patient denies any symptoms of hyperglycemia.    Essential hypertension  Chronic and well-controlled.  Patient's blood pressure in clinic is 120/80.  She is currently taking clonidine 0.1 mg twice daily, Cardizem 120 mg twice daily, losartan 100 mg daily, chlorthalidone 25 mg daily and carvedilol 50 mg twice daily.    She reports that she dropped her clonidine down from 0.2 mg twice daily to 0.1 mg twice due to symptoms of low blood pressure.  She reports that it \"made me feel funny\" she does feel better at the lower dose.    Right leg weakness  This a chronic condition.  Patient reports that she has had right leg weakness since her CVA.  She does currently use a walker to ambulate.  She has had improvement since her CVA however would like to get stronger to prevent falls.  I do feel is appropriate and referral was placed to physical therapy.  Patient will continue to use walker to ambulate safely.  Discussed fall risk precautions.    Mixed hyperlipidemia  The ASCVD Risk score (Kelsie JIMMY Jr., et al., 2013) failed to calculate for the following reasons:    The patient has a prior MI or stroke diagnosis    Chronic condition.  The pattern was well controlled.  HDL is mildly low.  Patient is currently taking simvastatin 10 mg tablet daily and omega-3 fatty acids.  Patient does have previous history of CVA.  She denies any myalgias.    Long term (current) use of anticoagulants [Z79.01]  Chronic well-controlled condition.  Patient is currently taking warfarin 5 mg daily.  She is followed closely with the anticoagulation clinic.  Patient denies any abnormal bruising or bleeding today.          ROS:  Gen: no fevers/chills, no " "changes in weight  Pulm: no sob, no cough  CV: no chest pain, no palpitations  GI: no nausea/vomiting, no diarrhea  MSk: Positive per HPI  Neuro: no headaches, no numbness/tingling      Allergies   Allergen Reactions   • Sulfa Drugs Rash       Current medicines (including changes today)  Current Outpatient Medications   Medication Sig Dispense Refill   • Omega-3 Fatty Acids (OMEGA 3 PO) Take  by mouth.     • loratadine (CLARITIN) 10 MG Tab Take 10 mg by mouth every day.     • cloNIDine (CATAPRES) 0.2 MG Tab TAKE 1 TABLET TWICE DAILY 180 Tablet 3   • chlorthalidone (HYGROTON) 25 MG Tab TAKE 1 TABLET EVERY MORNING 90 Tablet 3   • simvastatin (ZOCOR) 10 MG Tab TAKE 1 TABLET EVERY EVENING 90 Tablet 3   • carvedilol (COREG) 25 MG Tab TAKE 2 TABLETS BY MOUTH 2 TIMES A DAY. 360 Tablet 3   • warfarin (COUMADIN) 5 MG Tab TAKE 1 TO 2 TABLETS EVERY DAY AS DIRECTED BY [facility] 180 Tablet 1   • losartan (COZAAR) 100 MG Tab TAKE 1 TABLET EVERY DAY 90 Tablet 3   • DILTIAZem (CARDIZEM) 120 MG Tab Take 1 tablet by mouth 2 times a day. 180 tablet 3     No current facility-administered medications for this visit.          Objective:       /80   Pulse 96   Temp 36.9 °C (98.4 °F)   Resp 16   Ht 1.664 m (5' 5.5\")   Wt 90.6 kg (199 lb 12.8 oz)   SpO2 96%  Body mass index is 32.74 kg/m².    Physical Exam:  Constitutional: Well-developed and well-nourished female in NAD. Not diaphoretic. No distress.   Skin: warm, dry, intact  Cardiovascular: Regular rate and rhythm without murmur. Radial pulses are intact and equal bilaterally.  Pulmonary: Clear to ausculation. Normal effort. No rales, ronchi, or wheezing.  Extremities: No cyanosis, clubbing, erythema, nor edema.  Decreased right lower leg strength, normal ROM, normal sensation.  Neurological: Alert and oriented x 3.   Psychiatric:  Behavior, mood, and affect are appropriate.      Assessment and Plan:     The following treatment plan was discussed:  I " have reviewed labs with patient and answered all questions.    1. Long term (current) use of anticoagulants [Z79.01]  Chronic well-controlled.  Patient will continue on warfarin.  Encourage patient to continue following with anticoagulation clinic.    2. Right leg weakness  Chronic condition.  Patient was agreeable to referral for physical therapy.  Encourage patient to continue use walker.  Fall risk rashes are discussed.  - Referral to Physical Therapy    3. Essential hypertension  Chronic and well controlled.  Patient can continue with clonidine 0.1 milligrams twice daily.  Patient may decrease clonidine dose as tolerated.  Encourage patient to monitor blood pressures at home and take clonidine if blood pressures are elevated greater than 140/90.  Encourage patient to follow-up with cardiologist.  All other medication doses will continue at the same dose and frequency.  - CBC WITH DIFFERENTIAL; Future    4. Mixed hyperlipidemia  Chronic and well-controlled.  Encourage patient to continue taking simvastatin daily.  No refills needed at this time.  Discussed appropriate diet lifestyle modifications.  Encourage patient to exercise as tolerated.  - Lipid Profile; Future    5. Elevated fasting glucose  Chronic and stable.  Discussed appropriate diet lifestyle modifications.  We will continue to monitor.  - Comp Metabolic Panel; Future  - HEMOGLOBIN A1C; Future    Other orders  - Omega-3 Fatty Acids (OMEGA 3 PO); Take  by mouth.  - loratadine (CLARITIN) 10 MG Tab; Take 10 mg by mouth every day.      Any change or worsening of signs or symptoms, patient encouraged to follow-up or report to urgent care or emergency room for further evaluation. Patient verbalizes understanding and agrees.    Follow-Up: Return in about 6 months (around 11/24/2022) for Follow up labs.      PLEASE NOTE: This dictation was created using voice recognition software. I have made every reasonable attempt to correct obvious errors, but I expect  that there are errors of grammar and possibly content that I did not discover before finalizing the note.

## 2022-05-24 NOTE — ASSESSMENT & PLAN NOTE
Chronic well-controlled condition.  Patient is currently taking warfarin 5 mg daily.  She is followed closely with the anticoagulation clinic.  Patient denies any abnormal bruising or bleeding today.

## 2022-05-24 NOTE — ASSESSMENT & PLAN NOTE
The ASCVD Risk score (Kelsiemalcom MARQUEZ Jr., et al., 2013) failed to calculate for the following reasons:    The patient has a prior MI or stroke diagnosis    Chronic condition.  The pattern was well controlled.  HDL is mildly low.  Patient is currently taking simvastatin 10 mg tablet daily and omega-3 fatty acids.  Patient does have previous history of CVA.  She denies any myalgias.

## 2022-05-24 NOTE — ASSESSMENT & PLAN NOTE
This a chronic condition.  Patient reports that she has had right leg weakness since her CVA.  She does currently use a walker to ambulate.  She has had improvement since her CVA however would like to get stronger to prevent falls.  I do feel is appropriate and referral was placed to physical therapy.  Patient will continue to use walker to ambulate safely.  Discussed fall risk precautions.

## 2022-06-07 LAB — INR PPP: 3.1 (ref 2–3.5)

## 2022-06-08 ENCOUNTER — ANTICOAGULATION MONITORING (OUTPATIENT)
Dept: VASCULAR LAB | Facility: MEDICAL CENTER | Age: 72
End: 2022-06-08
Payer: MEDICARE

## 2022-06-08 DIAGNOSIS — Z79.01 LONG TERM (CURRENT) USE OF ANTICOAGULANTS: ICD-10-CM

## 2022-06-08 DIAGNOSIS — I48.0 PAROXYSMAL ATRIAL FIBRILLATION (HCC): ICD-10-CM

## 2022-06-08 DIAGNOSIS — I63.9 ISCHEMIC CEREBROVASCULAR ACCIDENT (CVA) (HCC): ICD-10-CM

## 2022-06-08 NOTE — PROGRESS NOTES
Anticoagulation Summary  As of 6/8/2022    INR goal:  2.0-3.0   TTR:  83.5 % (1.9 y)   INR used for dosing:  3.10 (6/7/2022)   Warfarin maintenance plan:  5 mg (5 mg x 1) every Wed; 10 mg (5 mg x 2) all other days   Weekly warfarin total:  65 mg   Plan last modified:  Vicky Callahan (2/18/2022)   Next INR check:  6/21/2022   Target end date:  Indefinite    Indications    Paroxysmal atrial fibrillation (HCC) [I48.0]  Long term (current) use of anticoagulants [Z79.01] [Z79.01]  Ischemic cerebrovascular accident (CVA) (HCC) [I63.9]             Anticoagulation Episode Summary     INR check location:  Anticoagulation Clinic    Preferred lab:      Send INR reminders to:      Comments:        Anticoagulation Care Providers     Provider Role Specialty Phone number    MILO Padilla  Nurse Practitioner Family [phone]          Refer to Anticoagulation Patient Findings for HPI  Patient Findings     Negatives:  Signs/symptoms of thrombosis, Signs/symptoms of bleeding, Laboratory test error suspected, Change in health, Change in alcohol use, Change in activity, Upcoming invasive procedure, Emergency department visit, Upcoming dental procedure, Missed doses, Extra doses, Change in medications, Change in diet/appetite, Hospital admission, Bruising, Other complaints          Spoke with pt.  INR is SUPRA therapeutic.     Pt verifies warfarin weekly dosing.     Will have pt eat an extra serving of greens and then continue on w/ her current regimen.    Repeat INR in 2 week(s).     Renny Christina, PharmD, BCACP

## 2022-06-22 LAB
INR PPP: 3.2 (ref 2–3.5)
INR PPP: 3.2 (ref 2–3.5)

## 2022-06-23 ENCOUNTER — ANTICOAGULATION MONITORING (OUTPATIENT)
Dept: VASCULAR LAB | Facility: MEDICAL CENTER | Age: 72
End: 2022-06-23
Payer: MEDICARE

## 2022-06-23 DIAGNOSIS — Z79.01 LONG TERM (CURRENT) USE OF ANTICOAGULANTS: ICD-10-CM

## 2022-06-23 DIAGNOSIS — I63.9 ISCHEMIC CEREBROVASCULAR ACCIDENT (CVA) (HCC): ICD-10-CM

## 2022-06-23 DIAGNOSIS — I48.0 PAROXYSMAL ATRIAL FIBRILLATION (HCC): ICD-10-CM

## 2022-06-23 NOTE — PROGRESS NOTES
Anticoagulation Summary  As of 6/23/2022    INR goal:  2.0-3.0   TTR:  81.7 % (1.9 y)   INR used for dosing:  3.20 (6/22/2022)   Warfarin maintenance plan:  5 mg (5 mg x 1) every Sun, Wed; 10 mg (5 mg x 2) all other days   Weekly warfarin total:  60 mg   Plan last modified:  Renny Christina PharmD (6/23/2022)   Next INR check:  7/6/2022   Target end date:  Indefinite    Indications    Paroxysmal atrial fibrillation (HCC) [I48.0]  Long term (current) use of anticoagulants [Z79.01] [Z79.01]  Ischemic cerebrovascular accident (CVA) (HCC) [I63.9]             Anticoagulation Episode Summary     INR check location:  Anticoagulation Clinic    Preferred lab:      Send INR reminders to:      Comments:        Anticoagulation Care Providers     Provider Role Specialty Phone number    MILO Padilla  Nurse Practitioner Family [phone]          Refer to Anticoagulation Patient Findings for HPI  Patient Findings     Negatives:  Signs/symptoms of thrombosis, Signs/symptoms of bleeding, Laboratory test error suspected, Change in health, Change in alcohol use, Change in activity, Upcoming invasive procedure, Emergency department visit, Upcoming dental procedure, Missed doses, Extra doses, Change in medications, Change in diet/appetite, Hospital admission, Bruising, Other complaints        Spoke with pt.  INR is SUPRA therapeutic.     Pt verifies warfarin weekly dosing.     Will have pt take a decreased dose of 7.5 mg today and then begin newly decreased regimen of 5 mg Sun/Wed and 10 mg ROW.    Repeat INR in 2 week(s).     Renny Christina, PharmD, BCACP

## 2022-07-07 ENCOUNTER — ANTICOAGULATION MONITORING (OUTPATIENT)
Dept: VASCULAR LAB | Facility: MEDICAL CENTER | Age: 72
End: 2022-07-07
Payer: MEDICARE

## 2022-07-07 DIAGNOSIS — I63.9 ISCHEMIC CEREBROVASCULAR ACCIDENT (CVA) (HCC): ICD-10-CM

## 2022-07-07 DIAGNOSIS — Z79.01 LONG TERM (CURRENT) USE OF ANTICOAGULANTS: ICD-10-CM

## 2022-07-07 DIAGNOSIS — I48.0 PAROXYSMAL ATRIAL FIBRILLATION (HCC): ICD-10-CM

## 2022-07-07 LAB — INR PPP: 2.2 (ref 2–3.5)

## 2022-07-07 NOTE — PROGRESS NOTES
Anticoagulation Summary  As of 2022    INR goal:  2.0-3.0   TTR:  81.7 % (2 y)   INR used for dosin.20 (2022)   Warfarin maintenance plan:  5 mg (5 mg x 1) every Wed; 10 mg (5 mg x 2) all other days   Weekly warfarin total:  65 mg   Plan last modified:  Renny Christina PharmD (2022)   Next INR check:  2022   Target end date:  Indefinite    Indications    Paroxysmal atrial fibrillation (HCC) [I48.0]  Long term (current) use of anticoagulants [Z79.01] [Z79.01]  Ischemic cerebrovascular accident (CVA) (HCC) [I63.9]             Anticoagulation Episode Summary     INR check location:  Anticoagulation Clinic    Preferred lab:      Send INR reminders to:      Comments:        Anticoagulation Care Providers     Provider Role Specialty Phone number    MILO Padilla  Nurse Practitioner Family [phone]        Anticoagulation Patient Findings  Patient Findings     Negatives:  Signs/symptoms of thrombosis, Signs/symptoms of bleeding, Laboratory test error suspected, Change in health, Change in alcohol use, Change in activity, Upcoming invasive procedure, Emergency department visit, Upcoming dental procedure, Missed doses, Extra doses, Change in medications, Change in diet/appetite, Hospital admission, Bruising, Other complaints              Spoke with patient's spouse today regarding therapeutic INR of 2.2.  Patient denies any signs/symptoms of bruising or bleeding or any changes in diet and medications.  Instructed patient to call clinic with any questions or concerns.    Pt is not on antiplatelet therapy     Pt is to continue with current warfarin dosing regimen.  Follow up in 2 weeks, to reduce risk of adverse events related to this high risk medication,  Warfarin.    Shira Hernandez, Pharmacy Intern

## 2022-07-21 ENCOUNTER — ANTICOAGULATION MONITORING (OUTPATIENT)
Dept: VASCULAR LAB | Facility: MEDICAL CENTER | Age: 72
End: 2022-07-21
Payer: MEDICARE

## 2022-07-21 DIAGNOSIS — Z79.01 LONG TERM (CURRENT) USE OF ANTICOAGULANTS: ICD-10-CM

## 2022-07-21 DIAGNOSIS — I48.0 PAROXYSMAL ATRIAL FIBRILLATION (HCC): ICD-10-CM

## 2022-07-21 DIAGNOSIS — I63.9 ISCHEMIC CEREBROVASCULAR ACCIDENT (CVA) (HCC): ICD-10-CM

## 2022-07-21 LAB — INR PPP: 2.2 (ref 2–3.5)

## 2022-07-21 NOTE — PROGRESS NOTES
Anticoagulation Summary  As of 2022    INR goal:  2.0-3.0   TTR:  82.0 % (2 y)   INR used for dosin.20 (2022)   Warfarin maintenance plan:  5 mg (5 mg x 1) every Wed; 10 mg (5 mg x 2) all other days   Weekly warfarin total:  65 mg   Plan last modified:  Renny Christina PharmD (2022)   Next INR check:  2022   Target end date:  Indefinite    Indications    Paroxysmal atrial fibrillation (HCC) [I48.0]  Long term (current) use of anticoagulants [Z79.01] [Z79.01]  Ischemic cerebrovascular accident (CVA) (HCC) [I63.9]             Anticoagulation Episode Summary     INR check location:  Anticoagulation Clinic    Preferred lab:      Send INR reminders to:      Comments:        Anticoagulation Care Providers     Provider Role Specialty Phone number    MILO Padilla  Nurse Practitioner Family [phone]          Refer to Anticoagulation Patient Findings for HPI  Patient Findings     Negatives:  Signs/symptoms of thrombosis, Signs/symptoms of bleeding, Laboratory test error suspected, Change in health, Change in alcohol use, Change in activity, Upcoming invasive procedure, Emergency department visit, Upcoming dental procedure, Missed doses, Extra doses, Change in medications, Change in diet/appetite, Hospital admission, Bruising, Other complaints          Spoke with patient's  Allen to report a therapeutic INR.      Pt is NOT on antiplatelet therapy    Pt instructed to continue with current warfarin dosing regimen, confirms dosing.   Will follow up in 2 week(s).     Rena Hassan     +++++++++++++++++++++++++++++++++++++++++++++++++++++++++++++++++++    I have reviewed and concur with the above plan.       Current Outpatient Medications:   •  Omega-3 Fatty Acids (OMEGA 3 PO), Take  by mouth.  •  loratadine, 10 mg, Oral, DAILY  •  cloNIDine, TAKE 1 TABLET TWICE DAILY  •  chlorthalidone, TAKE 1 TABLET EVERY MORNING  •  simvastatin, TAKE 1 TABLET EVERY EVENING  •  carvedilol, 50  mg, Oral, BID  •  warfarin, TAKE 1 TO 2 TABLETS EVERY DAY AS DIRECTED BY [facility]  •  losartan, TAKE 1 TABLET EVERY DAY  •  DILTIAZem, 120 mg, Oral, BID    Jaspal Oh, PharmD, MS, BCACP, [facility]  Phone: [phone]  Fax: [phone]  On call: [phone]  General scheduling/information [phone]  For emergencies please dial 911  Please do not use [facility] for urgent matters, call the phone numbers listed above.    This note was created using voice recognition software (Dragon). The accuracy of the dictation is limited by the abilities of the software. I have reviewed the note prior to signing, however some errors in grammar and context are still possible. If you have any questions related to this note please do not hesitate to contact our office.

## 2022-08-06 LAB — INR PPP: 1.9 (ref 2–3.5)

## 2022-08-08 ENCOUNTER — ANTICOAGULATION MONITORING (OUTPATIENT)
Dept: MEDICAL GROUP | Facility: PHYSICIAN GROUP | Age: 72
End: 2022-08-08
Payer: MEDICARE

## 2022-08-08 DIAGNOSIS — I48.0 PAROXYSMAL ATRIAL FIBRILLATION (HCC): ICD-10-CM

## 2022-08-08 DIAGNOSIS — I63.9 ISCHEMIC CEREBROVASCULAR ACCIDENT (CVA) (HCC): ICD-10-CM

## 2022-08-08 DIAGNOSIS — Z79.01 LONG TERM (CURRENT) USE OF ANTICOAGULANTS: ICD-10-CM

## 2022-08-08 RX ORDER — LOSARTAN POTASSIUM 100 MG/1
TABLET ORAL
Qty: 90 TABLET | Refills: 3 | Status: SHIPPED | OUTPATIENT
Start: 2022-08-08 | End: 2022-08-15 | Stop reason: SDUPTHER

## 2022-08-08 NOTE — PROGRESS NOTES
Anticoagulation Summary  As of 2022    INR goal:  2.0-3.0   TTR:  81.6 % (2 y)   INR used for dosin.90 (2022)   Warfarin maintenance plan:  5 mg (5 mg x 1) every Wed; 10 mg (5 mg x 2) all other days   Weekly warfarin total:  65 mg   Plan last modified:  Renny Christina PharmD (2022)   Next INR check:  2022   Target end date:  Indefinite    Indications    Paroxysmal atrial fibrillation (HCC) [I48.0]  Long term (current) use of anticoagulants [Z79.01] [Z79.01]  Ischemic cerebrovascular accident (CVA) (HCC) [I63.9]             Anticoagulation Episode Summary     INR check location:  Anticoagulation Clinic    Preferred lab:      Send INR reminders to:      Comments:        Anticoagulation Care Providers     Provider Role Specialty Phone number    IMLO Padilla  Nurse Practitioner Family [phone]          Refer to Anticoagulation Patient Findings for HPI  Patient Findings     Positives:  Change in diet/appetite (started drinking protein shakes - has now stopped)    Negatives:  Signs/symptoms of thrombosis, Signs/symptoms of bleeding, Laboratory test error suspected, Change in health, Change in alcohol use, Change in activity, Upcoming invasive procedure, Emergency department visit, Upcoming dental procedure, Missed doses, Extra doses, Change in medications, Hospital admission, Bruising, Other complaints          Spoke with pt's .  INR is subtherapeutic.     Pt verifies warfarin weekly dosing.     Will have pt continue current regimen as INR is 0.1 out of range and pt will stop drinking protein shakes    Repeat INR in 2 week(s).     Jolanta Barnett, PharmD

## 2022-08-08 NOTE — TELEPHONE ENCOUNTER
Received request via: Pharmacy    Was the patient seen in the last year in this department? Yes    Does the patient have an active prescription (recently filled or refills available) for medication(s) requested? No     Last OV 5/24/2022  Next OV 11/28/2022  Last Labs 8/6/2022

## 2022-08-15 ENCOUNTER — TELEPHONE (OUTPATIENT)
Dept: URGENT CARE | Facility: PHYSICIAN GROUP | Age: 72
End: 2022-08-15
Payer: MEDICARE

## 2022-08-15 ENCOUNTER — OFFICE VISIT (OUTPATIENT)
Dept: MEDICAL GROUP | Facility: PHYSICIAN GROUP | Age: 72
End: 2022-08-15
Payer: MEDICARE

## 2022-08-15 VITALS
BODY MASS INDEX: 30.53 KG/M2 | RESPIRATION RATE: 16 BRPM | HEIGHT: 66 IN | HEART RATE: 71 BPM | TEMPERATURE: 99.4 F | DIASTOLIC BLOOD PRESSURE: 72 MMHG | OXYGEN SATURATION: 94 % | SYSTOLIC BLOOD PRESSURE: 118 MMHG | WEIGHT: 190 LBS

## 2022-08-15 DIAGNOSIS — Z00.00 ENCOUNTER FOR MEDICARE ANNUAL WELLNESS EXAM: ICD-10-CM

## 2022-08-15 DIAGNOSIS — Z12.31 ENCOUNTER FOR SCREENING MAMMOGRAM FOR BREAST CANCER: ICD-10-CM

## 2022-08-15 DIAGNOSIS — I48.0 PAROXYSMAL ATRIAL FIBRILLATION (HCC): ICD-10-CM

## 2022-08-15 DIAGNOSIS — I10 ESSENTIAL HYPERTENSION: ICD-10-CM

## 2022-08-15 DIAGNOSIS — Z79.01 CHRONIC ANTICOAGULATION: ICD-10-CM

## 2022-08-15 DIAGNOSIS — Z12.31 VISIT FOR SCREENING MAMMOGRAM: ICD-10-CM

## 2022-08-15 DIAGNOSIS — E66.9 OBESITY (BMI 30-39.9): ICD-10-CM

## 2022-08-15 DIAGNOSIS — Z76.89 ENCOUNTER TO ESTABLISH CARE: ICD-10-CM

## 2022-08-15 PROCEDURE — 99214 OFFICE O/P EST MOD 30 MIN: CPT

## 2022-08-15 RX ORDER — SIMVASTATIN 10 MG
10 TABLET ORAL EVERY EVENING
Qty: 90 TABLET | Refills: 3 | Status: SHIPPED | OUTPATIENT
Start: 2022-08-15 | End: 2023-08-14

## 2022-08-15 RX ORDER — LOSARTAN POTASSIUM 100 MG/1
100 TABLET ORAL
Qty: 90 TABLET | Refills: 3 | Status: SHIPPED | OUTPATIENT
Start: 2022-08-15 | End: 2023-08-28

## 2022-08-15 RX ORDER — WARFARIN SODIUM 5 MG/1
TABLET ORAL
Qty: 180 TABLET | Refills: 1 | Status: SHIPPED | OUTPATIENT
Start: 2022-08-15 | End: 2023-04-06

## 2022-08-15 RX ORDER — CLONIDINE HYDROCHLORIDE 0.1 MG/1
0.1 TABLET ORAL 2 TIMES DAILY
Qty: 180 TABLET | Refills: 3 | Status: SHIPPED | OUTPATIENT
Start: 2022-08-15 | End: 2023-08-30

## 2022-08-15 RX ORDER — CHLORTHALIDONE 25 MG/1
25 TABLET ORAL EVERY MORNING
Qty: 90 TABLET | Refills: 3 | Status: SHIPPED | OUTPATIENT
Start: 2022-08-15 | End: 2023-08-14

## 2022-08-15 RX ORDER — CARVEDILOL 25 MG/1
50 TABLET ORAL 2 TIMES DAILY
Qty: 360 TABLET | Refills: 3 | Status: SHIPPED | OUTPATIENT
Start: 2022-08-15 | End: 2023-04-06

## 2022-08-15 RX ORDER — DILTIAZEM HYDROCHLORIDE 120 MG/1
120 TABLET, FILM COATED ORAL 2 TIMES DAILY
Qty: 180 TABLET | Refills: 3 | Status: SHIPPED | OUTPATIENT
Start: 2022-08-15 | End: 2023-07-06

## 2022-08-15 RX ORDER — DILTIAZEM HYDROCHLORIDE 120 MG/1
120 CAPSULE, COATED, EXTENDED RELEASE ORAL 2 TIMES DAILY
Qty: 28 CAPSULE | Refills: 0 | Status: SHIPPED | OUTPATIENT
Start: 2022-08-15 | End: 2022-08-29

## 2022-08-15 ASSESSMENT — FIBROSIS 4 INDEX: FIB4 SCORE: 1.233288287465667939

## 2022-08-15 NOTE — PROGRESS NOTES
"Subjective:     CC: Tonya Del Valle presents today for prescription refill request.    HPI:   Tonya presents today with    Problem   Obesity (Bmi 30-39.9)    This is a chronic condition, patient reports she has lost 10 pounds since November 2021.  She is journaling and keeping track of her intake.     Essential Hypertension    This is a chronic condition, well controlled with current medications       ROS:  Review of Systems   All other systems reviewed and are negative.    Objective:     Exam:  /72 (BP Location: Right arm, Patient Position: Sitting, BP Cuff Size: Large adult)   Pulse 71   Temp 37.4 °C (99.4 °F) (Temporal)   Resp 16   Ht 1.664 m (5' 5.5\")   Wt 86.2 kg (190 lb)   SpO2 94%   BMI 31.14 kg/m²  Body mass index is 31.14 kg/m².    Physical Exam  Constitutional:       Appearance: Normal appearance.   HENT:      Head: Normocephalic and atraumatic.      Right Ear: Tympanic membrane, ear canal and external ear normal.      Left Ear: Tympanic membrane, ear canal and external ear normal.      Nose: Nose normal.   Eyes:      Extraocular Movements: Extraocular movements intact.      Conjunctiva/sclera: Conjunctivae normal.      Pupils: Pupils are equal, round, and reactive to light.   Cardiovascular:      Rate and Rhythm: Normal rate and regular rhythm.      Pulses: Normal pulses.      Heart sounds: Normal heart sounds.   Pulmonary:      Effort: Pulmonary effort is normal.      Breath sounds: Normal breath sounds.   Abdominal:      General: Abdomen is flat.   Musculoskeletal:      Cervical back: Normal range of motion and neck supple.   Skin:     General: Skin is warm and dry.   Neurological:      General: No focal deficit present.      Mental Status: She is alert and oriented to person, place, and time.   Psychiatric:         Mood and Affect: Mood normal.         Behavior: Behavior normal.         Thought Content: Thought content normal.         Judgment: Judgment normal.       A chaperone was " offered to the patient during today's exam. Chaperone name: Allen was present.    Labs: Reviewed from 5/2021-5/2022    Assessment & Plan:     71 y.o. female with the following -       1. Essential hypertension  Is a chronic condition, stable.  Patient is requesting refills of her medications today  - carvedilol (COREG) 25 MG Tab; Take 2 Tablets by mouth 2 times a day.  Dispense: 360 Tablet; Refill: 3  - chlorthalidone (HYGROTON) 25 MG Tab; Take 1 Tablet by mouth every morning.  Dispense: 90 Tablet; Refill: 3  - losartan (COZAAR) 100 MG Tab; Take 1 Tablet by mouth every day.  Dispense: 90 Tablet; Refill: 3  - simvastatin (ZOCOR) 10 MG Tab; Take 1 Tablet by mouth every evening.  Dispense: 90 Tablet; Refill: 3  - cloNIDine (CATAPRES) 0.1 MG Tab; Take 1 Tablet by mouth 2 times a day.  Dispense: 180 Tablet; Refill: 3    2. Chronic anticoagulation  Chronic, stable.  Continue therapy as directed by [facility].  - warfarin (COUMADIN) 5 MG Tab; TAKE 1 TO 2 TABLETS EVERY DAY [facility]  Dispense: 180 Tablet; Refill: 1    3. Paroxysmal atrial fibrillation (HCC)  Chronic, stable.  Continue medication as directed   - DILTIAZem (CARDIZEM) 120 MG Tab; Take 1 Tablet by mouth 2 times a day.  Dispense: 180 Tablet; Refill: 3    4. Encounter for screening mammogram for breast cancer  - MA-SCREENING MAMMO BILAT W/TOMOSYNTHESIS W/CAD; Future    5. Encounter for Medicare annual wellness exam  Patient presents presents today needing refills on medications, and review of medical systems and current problems.  These things were accomplished, and we will schedule a 6-month follow-up visit.    7. Obesity (BMI 30-39.9)  Chronic, stable.  Patient reports she has had a 10 pound weight loss since November of last year.  Discussed healthy lifestyle recommendations, patient reports she is limiting portions, choosing healthy foods, and journaling her intake.  - Patient identified as having  weight management issue.  Appropriate orders and counseling given.    8. Encounter to establish care  Prior PCP DIANE Baltazar in [place].  Patiently recently moved to [place], and would like to establish closer to home.    Return in about 6 months (around 2/15/2023), or if symptoms worsen or fail to improve.    I have placed the below orders and discussed them with an approved delegating provider.  The MA is performing the below orders under the direction of Dr. Rubio.    Please note that this dictation was created using voice recognition software. I have made every reasonable attempt to correct obvious errors, but I expect that there are errors of grammar and possibly content that I did not discover before finalizing the note.

## 2022-08-15 NOTE — TELEPHONE ENCOUNTER
"The patient's , Allen came in to urgent care \"demanding\" a refill for his wife's medication diltiazen. He was told that she would have to be seen by a Provider because according to the chart, she hasn't had that medication refilled since 7/21.  "

## 2022-08-20 LAB — INR PPP: 2.5 (ref 2–3.5)

## 2022-08-22 ENCOUNTER — ANTICOAGULATION MONITORING (OUTPATIENT)
Dept: MEDICAL GROUP | Facility: PHYSICIAN GROUP | Age: 72
End: 2022-08-22
Payer: MEDICARE

## 2022-08-22 DIAGNOSIS — I48.0 PAROXYSMAL ATRIAL FIBRILLATION (HCC): ICD-10-CM

## 2022-08-22 DIAGNOSIS — I63.9 ISCHEMIC CEREBROVASCULAR ACCIDENT (CVA) (HCC): ICD-10-CM

## 2022-08-22 DIAGNOSIS — Z79.01 LONG TERM (CURRENT) USE OF ANTICOAGULANTS: ICD-10-CM

## 2022-08-22 NOTE — PROGRESS NOTES
Anticoagulation Summary  As of 2022      INR goal:  2.0-3.0   TTR:  81.7 % (2.1 y)   INR used for dosin.50 (2022)   Warfarin maintenance plan:  5 mg (5 mg x 1) every Wed; 10 mg (5 mg x 2) all other days   Weekly warfarin total:  65 mg   Plan last modified:  Renny Christina PharmD (2022)   Next INR check:  2022   Target end date:  Indefinite    Indications    Paroxysmal atrial fibrillation (HCC) [I48.0]  Long term (current) use of anticoagulants [Z79.01] [Z79.01]  Ischemic cerebrovascular accident (CVA) (HCC) [I63.9]                 Anticoagulation Episode Summary       INR check location:  Anticoagulation Clinic    Preferred lab:      Send INR reminders to:      Comments:            Anticoagulation Care Providers       Provider Role Specialty Phone number    MILO Padilla  Nurse Practitioner Family [phone]          Anticoagulation Patient Findings  Patient Findings       Negatives:  Signs/symptoms of thrombosis, Signs/symptoms of bleeding, Laboratory test error suspected, Change in health, Change in alcohol use, Change in activity, Upcoming invasive procedure, Emergency department visit, Upcoming dental procedure, Missed doses, Extra doses, Change in medications, Change in diet/appetite, Hospital admission, Bruising, Other complaints          Spoke with patient today regarding therapeutic INR of 2.5.  Patient denies any signs/symptoms of bruising or bleeding or any changes in diet and medications.  Instructed patient to call clinic with any questions or concerns.    Pt is not on antiplatelet therapy    Pt is to continue with current warfarin dosing regimen.  Follow up in 2 weeks, to reduce risk of adverse events related to this high risk medication,  Warfarin.    Nestor Burch, PharmD, BCACP

## 2022-09-04 LAB — INR PPP: 2.6 (ref 2–3.5)

## 2022-09-06 ENCOUNTER — ANTICOAGULATION MONITORING (OUTPATIENT)
Dept: MEDICAL GROUP | Facility: PHYSICIAN GROUP | Age: 72
End: 2022-09-06
Payer: MEDICARE

## 2022-09-06 DIAGNOSIS — I63.9 ISCHEMIC CEREBROVASCULAR ACCIDENT (CVA) (HCC): ICD-10-CM

## 2022-09-06 DIAGNOSIS — Z79.01 LONG TERM (CURRENT) USE OF ANTICOAGULANTS: ICD-10-CM

## 2022-09-06 DIAGNOSIS — I48.0 PAROXYSMAL ATRIAL FIBRILLATION (HCC): ICD-10-CM

## 2022-09-07 NOTE — PROGRESS NOTES
OP   Telephone Anticoagulation Service Note      Anticoagulation Summary  As of 2022      INR goal:  2.0-3.0   TTR:  82.0 % (2.1 y)   INR used for dosin.60 (2022)   Warfarin maintenance plan:  5 mg (5 mg x 1) every Wed; 10 mg (5 mg x 2) all other days   Weekly warfarin total:  65 mg   Plan last modified:  Renny Christina PharmD (2022)   Next INR check:  2022   Target end date:  Indefinite    Indications    Paroxysmal atrial fibrillation (HCC) [I48.0]  Long term (current) use of anticoagulants [Z79.01] [Z79.01]  Ischemic cerebrovascular accident (CVA) (HCC) [I63.9]                 Anticoagulation Episode Summary       INR check location:  Anticoagulation Clinic    Preferred lab:      Send INR reminders to:      Comments:            Anticoagulation Care Providers       Provider Role Specialty Phone number    MILO Padilla  Nurse Practitioner Family [phone]          Anticoagulation Patient Findings  Patient Findings       Negatives:  Signs/symptoms of thrombosis, Signs/symptoms of bleeding, Laboratory test error suspected, Change in health, Change in alcohol use, Change in activity, Upcoming invasive procedure, Emergency department visit, Upcoming dental procedure, Missed doses, Extra doses, Change in medications, Change in diet/appetite, Hospital admission, Bruising, Other complaints          Spoke with the patient's  on the phone today, reporting a therapeutic INR of 2.6.   Confirmed the current warfarin dosing regimen and patient compliance.  Patient denies any interval changes to diet and/or medications. Patient denies any signs/symptoms of bleeding or clotting.  Patient instructed to continue with the current warfarin dosing regimen, and asked to follow up again in 2 weeks.     Lei Callahan  PharmD

## 2022-09-16 ENCOUNTER — APPOINTMENT (OUTPATIENT)
Dept: MEDICAL GROUP | Facility: PHYSICIAN GROUP | Age: 72
End: 2022-09-16
Payer: MEDICARE

## 2022-09-18 LAB — INR PPP: 2.7 (ref 2–3.5)

## 2022-09-21 ENCOUNTER — ANTICOAGULATION MONITORING (OUTPATIENT)
Dept: VASCULAR LAB | Facility: MEDICAL CENTER | Age: 72
End: 2022-09-21
Payer: MEDICARE

## 2022-09-21 DIAGNOSIS — I63.9 ISCHEMIC CEREBROVASCULAR ACCIDENT (CVA) (HCC): ICD-10-CM

## 2022-09-21 DIAGNOSIS — I48.0 PAROXYSMAL ATRIAL FIBRILLATION (HCC): ICD-10-CM

## 2022-09-21 DIAGNOSIS — Z79.01 LONG TERM (CURRENT) USE OF ANTICOAGULANTS: ICD-10-CM

## 2022-09-21 NOTE — PROGRESS NOTES
Anticoagulation Summary  As of 2022      INR goal:  2.0-3.0   TTR:  82.3 % (2.2 y)   INR used for dosin.70 (2022)   Warfarin maintenance plan:  5 mg (5 mg x 1) every Wed; 10 mg (5 mg x 2) all other days   Weekly warfarin total:  65 mg   Plan last modified:  Idris RodriguezD (2022)   Next INR check:  10/5/2022   Target end date:  Indefinite    Indications    Paroxysmal atrial fibrillation (HCC) [I48.0]  Long term (current) use of anticoagulants [Z79.01] [Z79.01]  Ischemic cerebrovascular accident (CVA) (HCC) [I63.9]                 Anticoagulation Episode Summary       INR check location:  Anticoagulation Clinic    Preferred lab:      Send INR reminders to:      Comments:            Anticoagulation Care Providers       Provider Role Specialty Phone number    MILO Padilla  Nurse Practitioner Family [phone]          Anticoagulation Patient Findings  Patient Findings       Negatives:  Signs/symptoms of thrombosis, Signs/symptoms of bleeding, Laboratory test error suspected, Change in health, Change in alcohol use, Change in activity, Upcoming invasive procedure, Emergency department visit, Upcoming dental procedure, Missed doses, Extra doses, Change in medications, Change in diet/appetite, Hospital admission, Bruising, Other complaints           Spoke with patient today regarding therapeutic INR of 2.7.  Patient denies any signs/symptoms of bruising or bleeding or any changes in diet and medications.  Instructed patient to call clinic with any questions or concerns.    Pt is not on antiplatelet therapy    Pt is to continue with current warfarin dosing regimen.  F/u in 2 weeks.    Tom Sue, PhT

## 2022-10-02 LAB — INR PPP: 2.8 (ref 2–3.5)

## 2022-10-03 ENCOUNTER — ANTICOAGULATION MONITORING (OUTPATIENT)
Dept: CARDIOLOGY | Facility: MEDICAL CENTER | Age: 72
End: 2022-10-03
Payer: MEDICARE

## 2022-10-03 DIAGNOSIS — Z79.01 LONG TERM (CURRENT) USE OF ANTICOAGULANTS: ICD-10-CM

## 2022-10-03 DIAGNOSIS — I63.9 ISCHEMIC CEREBROVASCULAR ACCIDENT (CVA) (HCC): ICD-10-CM

## 2022-10-03 DIAGNOSIS — I48.0 PAROXYSMAL ATRIAL FIBRILLATION (HCC): ICD-10-CM

## 2022-10-04 NOTE — PROGRESS NOTES
Anticoagulation Summary  As of 10/3/2022      INR goal:  2.0-3.0   TTR:  82.6 % (2.2 y)   INR used for dosin.80 (10/2/2022)   Warfarin maintenance plan:  5 mg (5 mg x 1) every Wed; 10 mg (5 mg x 2) all other days   Weekly warfarin total:  65 mg   Plan last modified:  Renny Christina PharmD (2022)   Next INR check:  10/17/2022   Target end date:  Indefinite    Indications    Paroxysmal atrial fibrillation (HCC) [I48.0]  Long term (current) use of anticoagulants [Z79.01] [Z79.01]  Ischemic cerebrovascular accident (CVA) (HCC) [I63.9]                 Anticoagulation Episode Summary       INR check location:  Anticoagulation Clinic    Preferred lab:      Send INR reminders to:      Comments:            Anticoagulation Care Providers       Provider Role Specialty Phone number    MILO Padilla  Nurse Practitioner Family [phone]            Refer to Anticoagulation Patient Findings for HPI  Patient Findings       Negatives:  Signs/symptoms of thrombosis, Signs/symptoms of bleeding, Laboratory test error suspected, Change in health, Change in alcohol use, Change in activity, Upcoming invasive procedure, Emergency department visit, Upcoming dental procedure, Missed doses, Extra doses, Change in medications, Change in diet/appetite, Hospital admission, Bruising, Other complaints            Spoke with patient to report a therapeutic INR.      Pt is NOT on antiplatelet therapy     Pt instructed to continue with current warfarin dosing regimen, confirms dosing.   Will follow up in 2 week(s).     Jolanta Barnett, IdrisD

## 2022-10-17 ENCOUNTER — ANTICOAGULATION MONITORING (OUTPATIENT)
Dept: VASCULAR LAB | Facility: MEDICAL CENTER | Age: 72
End: 2022-10-17
Payer: MEDICARE

## 2022-10-17 DIAGNOSIS — I63.9 ISCHEMIC CEREBROVASCULAR ACCIDENT (CVA) (HCC): ICD-10-CM

## 2022-10-17 DIAGNOSIS — Z79.01 LONG TERM (CURRENT) USE OF ANTICOAGULANTS: ICD-10-CM

## 2022-10-17 DIAGNOSIS — I48.0 PAROXYSMAL ATRIAL FIBRILLATION (HCC): ICD-10-CM

## 2022-10-17 LAB — INR PPP: 2.6 (ref 2–3.5)

## 2022-10-17 NOTE — PROGRESS NOTES
OP Anticoagulation Service Note    Date: 10/17/2022    Anticoagulation Summary  As of 10/17/2022      INR goal:  2.0-3.0   TTR:  83.0 % (2.2 y)   INR used for dosin.60 (10/17/2022)   Warfarin maintenance plan:  5 mg (5 mg x 1) every Wed; 10 mg (5 mg x 2) all other days   Weekly warfarin total:  65 mg   Plan last modified:  Renny Christina PharmD (2022)   Next INR check:  10/31/2022   Target end date:  Indefinite    Indications    Paroxysmal atrial fibrillation (HCC) [I48.0]  Long term (current) use of anticoagulants [Z79.01] [Z79.01]  Ischemic cerebrovascular accident (CVA) (HCC) [I63.9]                 Anticoagulation Episode Summary       INR check location:  Anticoagulation Clinic    Preferred lab:      Send INR reminders to:      Comments:    Only call if out of range          Anticoagulation Care Providers       Provider Role Specialty Phone number    KRISTA [email or web address]  Nurse Practitioner Family [phone]          Anticoagulation Patient Findings        Voice message for patient regarding their anticoagulant.   Patient's preferred phone number:  [phone]        HPI:   The reason for today's call is to prevent morbidity and mortality from a blood clot and/or stroke and to reduce the risk of bleeding while on a anticoagulant.     PCP:  MILO Aguero  [address] [place] [id number]    Assessment:     INR  therapeutic.     Lab Results   Component Value Date/Time    BUN 22 2022 07:20 AM    CREATININE 0.83 2022 07:20 AM     Lab Results   Component Value Date/Time    HEMOGLOBIN 15.6 05/10/2021 07:17 AM    HEMATOCRIT 48.3 (H) 05/10/2021 07:17 AM    PLATELETCT 240 05/10/2021 07:17 AM    ALKPHOSPHAT 51 2022 07:20 AM    ASTSGOT 13 2022 07:20 AM    ALTSGPT 10 2022 07:20 AM          Current Outpatient Medications:     dilTIAZem, 120 mg, Oral, BID    carvedilol, 50 mg, Oral, BID    chlorthalidone, 25 mg, Oral, QAM    losartan, 100  mg, Oral, QDAY    simvastatin, 10 mg, Oral, Q EVENING    warfarin, TAKE 1 TO 2 TABLETS EVERY DAY AS DIRECTED BY [facility]    cloNIDine, 0.1 mg, Oral, BID    Omega-3 Fatty Acids (OMEGA 3 PO), Take  by mouth.    loratadine, 10 mg, Oral, DAILY      Plan:     Continue the same warfarin dose, as noted above.       Follow-up:     test in 2 weeks.        Additional information discussed with patient:     Asked patient to please call the anticoagulation clinic if they have any signs/symptoms of bleeding and/or thrombosis or any changes to diet or medications.      National recommendations regarding anticoagulation therapy:     The CHEST guidelines recommends frequent INR monitoring at regular intervals (a few days up to a max of 12 weeks) to ensure patients are on the proper dose of warfarin, and patients are not having any complications from therapy.  INRs can dramatically change over a short time period due to diet, medications, and medical conditions.       Jaspal Oh, PharmD, MS, BCACP, LCC  [facility]  Phone: [phone]  Fax: [phone]  On call: [phone]  General scheduling/information [phone]  For emergencies please dial 911  Please do not use [employer] for urgent matters, call the phone numbers listed above.    This note was created using voice recognition software (Dragon). The accuracy of the dictation is limited by the abilities of the software. I have reviewed the note prior to signing, however some errors in grammar and context are still possible. If you have any questions related to this note please do not hesitate to contact our office.

## 2022-10-31 ENCOUNTER — ANTICOAGULATION MONITORING (OUTPATIENT)
Dept: VASCULAR LAB | Facility: MEDICAL CENTER | Age: 72
End: 2022-10-31
Payer: MEDICARE

## 2022-10-31 DIAGNOSIS — I48.0 PAROXYSMAL ATRIAL FIBRILLATION (HCC): ICD-10-CM

## 2022-10-31 DIAGNOSIS — Z79.01 LONG TERM (CURRENT) USE OF ANTICOAGULANTS: ICD-10-CM

## 2022-10-31 DIAGNOSIS — I63.9 ISCHEMIC CEREBROVASCULAR ACCIDENT (CVA) (HCC): ICD-10-CM

## 2022-10-31 LAB — INR PPP: 2.4 (ref 2–3.5)

## 2022-10-31 NOTE — PROGRESS NOTES
Anticoagulation Summary  As of 10/31/2022      INR goal:  2.0-3.0   TTR:  83.2 % (2.3 y)   INR used for dosin.40 (10/31/2022)   Warfarin maintenance plan:  5 mg (5 mg x 1) every Wed; 10 mg (5 mg x 2) all other days   Weekly warfarin total:  65 mg   Plan last modified:  Renny Christina PharmD (2022)   Next INR check:  2022   Target end date:  Indefinite    Indications    Paroxysmal atrial fibrillation (HCC) [I48.0]  Long term (current) use of anticoagulants [Z79.01] [Z79.01]  Ischemic cerebrovascular accident (CVA) (HCC) [I63.9]                 Anticoagulation Episode Summary       INR check location:  Anticoagulation Clinic    Preferred lab:      Send INR reminders to:      Comments:    Only call if out of range          Anticoagulation Care Providers       Provider Role Specialty Phone number    KRISTA Padilla.R.N.  Nurse Practitioner Family [phone]            Refer to Anticoagulation Patient Findings for HPI    INR therapeutic at 2.4.      Per chart review pt prefers no phone call if INR in range.    Pt to continue with current warfarin dosing regimen.     Will follow up in 2 week(s).     Renny Christina PharmD, BCACP

## 2022-11-14 ENCOUNTER — ANTICOAGULATION MONITORING (OUTPATIENT)
Dept: VASCULAR LAB | Facility: MEDICAL CENTER | Age: 72
End: 2022-11-14
Payer: MEDICARE

## 2022-11-14 DIAGNOSIS — I48.0 PAROXYSMAL ATRIAL FIBRILLATION (HCC): ICD-10-CM

## 2022-11-14 DIAGNOSIS — I63.9 ISCHEMIC CEREBROVASCULAR ACCIDENT (CVA) (HCC): Chronic | ICD-10-CM

## 2022-11-14 DIAGNOSIS — Z79.01 LONG TERM (CURRENT) USE OF ANTICOAGULANTS: ICD-10-CM

## 2022-11-14 LAB — INR PPP: 3.9 (ref 2–3.5)

## 2022-11-15 NOTE — PROGRESS NOTES
Anticoagulation Summary  As of 11/14/2022      INR goal:  2.0-3.0   TTR:  82.5 % (2.3 y)   INR used for dosing:  3.90 (11/14/2022)   Warfarin maintenance plan:  5 mg (5 mg x 1) every Wed; 10 mg (5 mg x 2) all other days; Starting 11/14/2022   Weekly warfarin total:  65 mg   Plan last modified:  Renny Christina PharmD (6/23/2022)   Next INR check:  11/21/2022   Target end date:  Indefinite    Indications    Paroxysmal atrial fibrillation (HCC) [I48.0]  Long term (current) use of anticoagulants [Z79.01] [Z79.01]  Ischemic cerebrovascular accident (CVA) (HCC) [I63.9]                 Anticoagulation Episode Summary       INR check location:  Anticoagulation Clinic    Preferred lab:      Send INR reminders to:      Comments:    Only call if out of range          Anticoagulation Care Providers       Provider Role Specialty Phone number    JO ANN Padilla.  Nurse Practitioner Family [phone]            Refer to Anticoagulation Patient Findings for HPI  Patient Findings       Positives:  Change in health, Change in diet/appetite    Negatives:  Signs/symptoms of thrombosis, Signs/symptoms of bleeding, Laboratory test error suspected, Change in alcohol use, Change in activity, Upcoming invasive procedure, Emergency department visit, Upcoming dental procedure, Missed doses, Extra doses, Change in medications, Hospital admission, Bruising, Other complaints    Comments:  Thinks she has been a little low on Vitamin K recently. Lost up to 30 lbs over the past year also.            Spoke with patient.  INR is supra therapeutic.     Pt verifies warfarin weekly dosing.     Pt is NOT on antiplatelet therapy     Will have pt hold dose today and then continue regimen as normal.    Repeat INR in 1 week(s).     Donnie Ruiz, PhT  Discussed with Jonel Odom

## 2022-11-16 ENCOUNTER — NON-PROVIDER VISIT (OUTPATIENT)
Dept: MEDICAL GROUP | Facility: PHYSICIAN GROUP | Age: 72
End: 2022-11-16
Payer: MEDICARE

## 2022-11-16 DIAGNOSIS — Z23 NEED FOR VACCINATION: ICD-10-CM

## 2022-11-16 PROCEDURE — G0008 ADMIN INFLUENZA VIRUS VAC: HCPCS

## 2022-11-16 PROCEDURE — 90662 IIV NO PRSV INCREASED AG IM: CPT

## 2022-11-16 NOTE — PROGRESS NOTES
"Tonya Del Valle is a 72 y.o. female here for a non-provider visit for:   FLU    Reason for immunization: Annual Flu Vaccine  Immunization records indicate need for vaccine: Yes, confirmed with Epic  Minimum interval has been met for this vaccine: Yes  ABN completed: Not Indicated    VIS Dated  11/16/2022 was given to patient: Yes  All IAC Questionnaire questions were answered \"No.\"    Patient tolerated injection and no adverse effects were observed or reported: Yes    Pt scheduled for next dose in series: Not Indicated   "

## 2022-11-21 ENCOUNTER — ANTICOAGULATION MONITORING (OUTPATIENT)
Dept: VASCULAR LAB | Facility: MEDICAL CENTER | Age: 72
End: 2022-11-21
Payer: MEDICARE

## 2022-11-21 DIAGNOSIS — Z79.01 LONG TERM (CURRENT) USE OF ANTICOAGULANTS: ICD-10-CM

## 2022-11-21 DIAGNOSIS — I63.9 ISCHEMIC CEREBROVASCULAR ACCIDENT (CVA) (HCC): Chronic | ICD-10-CM

## 2022-11-21 DIAGNOSIS — I48.0 PAROXYSMAL ATRIAL FIBRILLATION (HCC): ICD-10-CM

## 2022-11-21 LAB — INR PPP: 2.2 (ref 2–3.5)

## 2022-11-21 NOTE — PROGRESS NOTES
OP   Telephone Anticoagulation Service Note      Anticoagulation Summary  As of 2022      INR goal:  2.0-3.0   TTR:  82.3 % (2.3 y)   INR used for dosin.20 (2022)   Warfarin maintenance plan:  5 mg (5 mg x 1) every Wed; 10 mg (5 mg x 2) all other days; Starting 2022   Weekly warfarin total:  65 mg   Plan last modified:  Renny Christina PharmD (2022)   Next INR check:  2022   Target end date:  Indefinite    Indications    Paroxysmal atrial fibrillation (HCC) [I48.0]  Long term (current) use of anticoagulants [Z79.01] [Z79.01]  Ischemic cerebrovascular accident (CVA) (HCC) [I63.9]                 Anticoagulation Episode Summary       INR check location:  Anticoagulation Clinic    Preferred lab:      Send INR reminders to:      Comments:    Only call if out of range          Anticoagulation Care Providers       Provider Role Specialty Phone number    NATALI PadillaRMIKI.  Nurse Practitioner Family [phone]          Anticoagulation Patient Findings    Patient prefers we call her only if her INR is out of range.   INR is therapeutic today at 2.2.   Patient is aware to call the clinic with any changes to diet or medications, with any signs/sx of bleeding or clotting or with any questions or concerns.     Patient will continue with the current warfarin dosing regimen, and asked to follow up again in 2 weeks.     Lei SteinbergD

## 2022-12-05 SDOH — HEALTH STABILITY: PHYSICAL HEALTH: ON AVERAGE, HOW MANY MINUTES DO YOU ENGAGE IN EXERCISE AT THIS LEVEL?: 10 MIN

## 2022-12-05 SDOH — ECONOMIC STABILITY: INCOME INSECURITY: IN THE LAST 12 MONTHS, WAS THERE A TIME WHEN YOU WERE NOT ABLE TO PAY THE MORTGAGE OR RENT ON TIME?: NO

## 2022-12-05 SDOH — ECONOMIC STABILITY: INCOME INSECURITY: HOW HARD IS IT FOR YOU TO PAY FOR THE VERY BASICS LIKE FOOD, HOUSING, MEDICAL CARE, AND HEATING?: NOT VERY HARD

## 2022-12-05 SDOH — ECONOMIC STABILITY: TRANSPORTATION INSECURITY
IN THE PAST 12 MONTHS, HAS LACK OF TRANSPORTATION KEPT YOU FROM MEETINGS, WORK, OR FROM GETTING THINGS NEEDED FOR DAILY LIVING?: NO

## 2022-12-05 SDOH — ECONOMIC STABILITY: TRANSPORTATION INSECURITY
IN THE PAST 12 MONTHS, HAS LACK OF RELIABLE TRANSPORTATION KEPT YOU FROM MEDICAL APPOINTMENTS, MEETINGS, WORK OR FROM GETTING THINGS NEEDED FOR DAILY LIVING?: NO

## 2022-12-05 SDOH — ECONOMIC STABILITY: HOUSING INSECURITY
IN THE LAST 12 MONTHS, WAS THERE A TIME WHEN YOU DID NOT HAVE A STEADY PLACE TO SLEEP OR SLEPT IN A SHELTER (INCLUDING NOW)?: NO

## 2022-12-05 SDOH — ECONOMIC STABILITY: FOOD INSECURITY: WITHIN THE PAST 12 MONTHS, YOU WORRIED THAT YOUR FOOD WOULD RUN OUT BEFORE YOU GOT MONEY TO BUY MORE.: NEVER TRUE

## 2022-12-05 SDOH — ECONOMIC STABILITY: HOUSING INSECURITY: IN THE LAST 12 MONTHS, HOW MANY PLACES HAVE YOU LIVED?: 2

## 2022-12-05 SDOH — HEALTH STABILITY: PHYSICAL HEALTH: ON AVERAGE, HOW MANY DAYS PER WEEK DO YOU ENGAGE IN MODERATE TO STRENUOUS EXERCISE (LIKE A BRISK WALK)?: 1 DAY

## 2022-12-05 SDOH — ECONOMIC STABILITY: FOOD INSECURITY: WITHIN THE PAST 12 MONTHS, THE FOOD YOU BOUGHT JUST DIDN'T LAST AND YOU DIDN'T HAVE MONEY TO GET MORE.: NEVER TRUE

## 2022-12-05 SDOH — ECONOMIC STABILITY: TRANSPORTATION INSECURITY
IN THE PAST 12 MONTHS, HAS THE LACK OF TRANSPORTATION KEPT YOU FROM MEDICAL APPOINTMENTS OR FROM GETTING MEDICATIONS?: NO

## 2022-12-05 SDOH — HEALTH STABILITY: MENTAL HEALTH
STRESS IS WHEN SOMEONE FEELS TENSE, NERVOUS, ANXIOUS, OR CAN'T SLEEP AT NIGHT BECAUSE THEIR MIND IS TROUBLED. HOW STRESSED ARE YOU?: NOT AT ALL

## 2022-12-05 ASSESSMENT — SOCIAL DETERMINANTS OF HEALTH (SDOH)
HOW OFTEN DO YOU HAVE A DRINK CONTAINING ALCOHOL: NEVER
HOW OFTEN DO YOU ATTENT MEETINGS OF THE CLUB OR ORGANIZATION YOU BELONG TO?: NEVER
HOW OFTEN DO YOU ATTEND CHURCH OR RELIGIOUS SERVICES?: PATIENT DECLINED
HOW OFTEN DO YOU GET TOGETHER WITH FRIENDS OR RELATIVES?: ONCE A WEEK
DO YOU BELONG TO ANY CLUBS OR ORGANIZATIONS SUCH AS CHURCH GROUPS UNIONS, FRATERNAL OR ATHLETIC GROUPS, OR SCHOOL GROUPS?: NO
HOW OFTEN DO YOU HAVE SIX OR MORE DRINKS ON ONE OCCASION: NEVER
DO YOU BELONG TO ANY CLUBS OR ORGANIZATIONS SUCH AS CHURCH GROUPS UNIONS, FRATERNAL OR ATHLETIC GROUPS, OR SCHOOL GROUPS?: NO
IN A TYPICAL WEEK, HOW MANY TIMES DO YOU TALK ON THE PHONE WITH FAMILY, FRIENDS, OR NEIGHBORS?: THREE TIMES A WEEK
HOW OFTEN DO YOU ATTENT MEETINGS OF THE CLUB OR ORGANIZATION YOU BELONG TO?: NEVER
HOW OFTEN DO YOU GET TOGETHER WITH FRIENDS OR RELATIVES?: ONCE A WEEK
IN A TYPICAL WEEK, HOW MANY TIMES DO YOU TALK ON THE PHONE WITH FAMILY, FRIENDS, OR NEIGHBORS?: THREE TIMES A WEEK
HOW MANY DRINKS CONTAINING ALCOHOL DO YOU HAVE ON A TYPICAL DAY WHEN YOU ARE DRINKING: PATIENT DOES NOT DRINK
HOW HARD IS IT FOR YOU TO PAY FOR THE VERY BASICS LIKE FOOD, HOUSING, MEDICAL CARE, AND HEATING?: NOT VERY HARD
HOW OFTEN DO YOU ATTEND CHURCH OR RELIGIOUS SERVICES?: PATIENT DECLINED
WITHIN THE PAST 12 MONTHS, YOU WORRIED THAT YOUR FOOD WOULD RUN OUT BEFORE YOU GOT THE MONEY TO BUY MORE: NEVER TRUE

## 2022-12-05 ASSESSMENT — LIFESTYLE VARIABLES
HOW OFTEN DO YOU HAVE SIX OR MORE DRINKS ON ONE OCCASION: NEVER
HOW OFTEN DO YOU HAVE A DRINK CONTAINING ALCOHOL: NEVER
SKIP TO QUESTIONS 9-10: 1
HOW MANY STANDARD DRINKS CONTAINING ALCOHOL DO YOU HAVE ON A TYPICAL DAY: PATIENT DOES NOT DRINK
AUDIT-C TOTAL SCORE: 0

## 2022-12-06 ENCOUNTER — ANTICOAGULATION MONITORING (OUTPATIENT)
Dept: VASCULAR LAB | Facility: MEDICAL CENTER | Age: 72
End: 2022-12-06
Payer: MEDICARE

## 2022-12-06 DIAGNOSIS — Z79.01 LONG TERM (CURRENT) USE OF ANTICOAGULANTS: ICD-10-CM

## 2022-12-06 DIAGNOSIS — I48.0 PAROXYSMAL ATRIAL FIBRILLATION (HCC): ICD-10-CM

## 2022-12-06 DIAGNOSIS — I63.9 ISCHEMIC CEREBROVASCULAR ACCIDENT (CVA) (HCC): Chronic | ICD-10-CM

## 2022-12-06 LAB — INR PPP: 2.8 (ref 2–3.5)

## 2022-12-06 NOTE — PROGRESS NOTES
OP   Telephone Anticoagulation Service Note      Anticoagulation Summary  As of 2022      INR goal:  2.0-3.0   TTR:  82.6 % (2.4 y)   INR used for dosin.80 (2022)   Warfarin maintenance plan:  5 mg (5 mg x 1) every Wed; 10 mg (5 mg x 2) all other days; Starting 2022   Weekly warfarin total:  65 mg   Plan last modified:  Renny Christina PharmD (2022)   Next INR check:  2022   Target end date:  Indefinite    Indications    Paroxysmal atrial fibrillation (HCC) [I48.0]  Long term (current) use of anticoagulants [Z79.01] [Z79.01]  Ischemic cerebrovascular accident (CVA) (HCC) [I63.9]                 Anticoagulation Episode Summary       INR check location:  Anticoagulation Clinic    Preferred lab:      Send INR reminders to:      Comments:  HM  Only call if out of range          Anticoagulation Care Providers       Provider Role Specialty Phone number    NATALI PadillaRMIKI.  Nurse Practitioner Family [phone]          Anticoagulation Patient Findings    Patient requests we call only if her results are out of range.   INR is therapeutic today at 2.8.   Patient is aware to call the clinic with any changes to diet or medications, with any signs/sx of bleeding or clotting or with any questions or concerns.     Patient instructed to continue with the current warfarin dosing regimen, and asked to follow up again in 2 weeks.     Lei SteinbergD

## 2022-12-08 ENCOUNTER — OFFICE VISIT (OUTPATIENT)
Dept: MEDICAL GROUP | Facility: PHYSICIAN GROUP | Age: 72
End: 2022-12-08
Payer: MEDICARE

## 2022-12-08 VITALS
OXYGEN SATURATION: 93 % | DIASTOLIC BLOOD PRESSURE: 74 MMHG | HEIGHT: 66 IN | WEIGHT: 184 LBS | HEART RATE: 81 BPM | TEMPERATURE: 99.1 F | BODY MASS INDEX: 29.57 KG/M2 | RESPIRATION RATE: 16 BRPM | SYSTOLIC BLOOD PRESSURE: 122 MMHG

## 2022-12-08 DIAGNOSIS — I63.9 ISCHEMIC CEREBROVASCULAR ACCIDENT (CVA) (HCC): Chronic | ICD-10-CM

## 2022-12-08 DIAGNOSIS — R42 DISEQUILIBRIUM: ICD-10-CM

## 2022-12-08 DIAGNOSIS — E66.9 OBESITY (BMI 30-39.9): Chronic | ICD-10-CM

## 2022-12-08 DIAGNOSIS — E55.9 VITAMIN D INSUFFICIENCY: ICD-10-CM

## 2022-12-08 DIAGNOSIS — I10 ESSENTIAL HYPERTENSION: ICD-10-CM

## 2022-12-08 DIAGNOSIS — Z79.01 LONG TERM (CURRENT) USE OF ANTICOAGULANTS: ICD-10-CM

## 2022-12-08 DIAGNOSIS — I48.0 PAROXYSMAL ATRIAL FIBRILLATION (HCC): ICD-10-CM

## 2022-12-08 DIAGNOSIS — R73.01 ELEVATED FASTING GLUCOSE: ICD-10-CM

## 2022-12-08 DIAGNOSIS — Z91.81 PERSONAL HISTORY OF FALL: ICD-10-CM

## 2022-12-08 DIAGNOSIS — E78.2 MIXED HYPERLIPIDEMIA: ICD-10-CM

## 2022-12-08 DIAGNOSIS — Z00.00 ENCOUNTER FOR MEDICAL EXAMINATION TO ESTABLISH CARE: ICD-10-CM

## 2022-12-08 PROBLEM — Z76.89 ENCOUNTER TO ESTABLISH CARE: Status: RESOLVED | Noted: 2022-01-20 | Resolved: 2022-12-08

## 2022-12-08 PROCEDURE — 99215 OFFICE O/P EST HI 40 MIN: CPT

## 2022-12-08 ASSESSMENT — FIBROSIS 4 INDEX: FIB4 SCORE: 1.25

## 2022-12-08 NOTE — ASSESSMENT & PLAN NOTE
Occurred Aug. 24, 2009 secondary to undiagnosed Afib.   Deficits on right side. She currently uses a walker and has right leg weakness.

## 2022-12-08 NOTE — ASSESSMENT & PLAN NOTE
Chronic, controlled on 5 BP medications  -Carvedilol 50 mg BID (patient is wondering if she can cut back on this dose since she lost weight. I let her know she probably could but her cardiologist specified that she would like her to continue this dose. I recommended she make an annual follow-up and discuss it with her cardiologist).    -Chlorthalidone 25 mg every morning  - Clonidine 0.1 mg BID  -Diltiazem 120 mg BID  -Losartan 100 mg daily  BP in clinic is 122/74  Followed by Vascular. Patient due for annual follow-up.  Continue current management and close follow-up

## 2022-12-08 NOTE — PROGRESS NOTES
"Subjective:     CC:    Chief Complaint   Patient presents with    Establish Care       HISTORY OF THE PRESENT ILLNESS: Tonya is a pleasant 72 y.o. female here today to establish care for the chronic conditions below. Her prior PCP was DIANE Baltazar in [place].    Problem   Disequilibrium    She has issues balancing secondary to her stroke.  She is on warfarin for Afib.  She has fallen twice but she has caught herself.     Obesity (Bmi 30-39.9)    Patient reports she has lost approximately 35 pounds since November 2021.  She is journaling and keeping track of her intake.     Essential Hypertension    This is a chronic condition, well controlled with current medications     Ischemic Cerebrovascular Accident (Cva) (Hcc)    Occurred Aug. 24, 2009 secondary to undiagnosed Afib.   Deficits on right side. She currently uses a walker and has right leg weakness.   She worked with PT/OT after her stroke. She was advised to continue PT but she was so tired and felt she needed a break. She hasn't started back up because of insurance issues.        Health Maintenance: Completed    ROS:   All systems negative except as addressed in assessment and plan.       Objective:     Exam: /74 (BP Location: Left arm, Patient Position: Sitting, BP Cuff Size: Adult)   Pulse 81   Temp 37.3 °C (99.1 °F) (Temporal)   Resp 16   Ht 1.664 m (5' 5.5\")   Wt 83.5 kg (184 lb)   SpO2 93%  Body mass index is 30.15 kg/m².    Physical Exam  Vitals reviewed.   Constitutional:       Appearance: Normal appearance.   HENT:      Right Ear: Tympanic membrane normal.      Left Ear: Tympanic membrane normal.   Cardiovascular:      Rate and Rhythm: Normal rate.      Pulses: Normal pulses.      Heart sounds: Normal heart sounds.   Pulmonary:      Effort: Pulmonary effort is normal.      Breath sounds: Normal breath sounds.   Abdominal:      General: Abdomen is flat.      Palpations: Abdomen is soft.   Musculoskeletal:         General: Normal " range of motion.   Skin:     General: Skin is warm and dry.   Neurological:      Mental Status: Mental status is at baseline.   Psychiatric:         Mood and Affect: Mood normal.         Behavior: Behavior normal.     Labs: Reviewed from 05/21/22      Assessment & Plan:   72 y.o. female with the following -    1. Encounter for medical examination to establish care  Health conditions and medications reviewed and updated. All screenings discussed and up-to-date. Health maintenance completed.     - Lipid Profile; Future  - HEMOGLOBIN A1C; Future  - Comp Metabolic Panel; Future  - CBC WITH DIFFERENTIAL; Future  - VITAMIN D,25 HYDROXY (DEFICIENCY); Future  - TSH WITH REFLEX TO FT4; Future    2. Essential hypertension  Chronic, controlled on five BP medications and followed by Vascular.  -Carvedilol 50 mg BID (patient is wondering if she can cut back on this dose since she lost weight. I let her know she probably could but her cardiologist specified that she would like her to continue this dose. I recommended she make an annual follow-up and discuss it with her cardiologist).    -Chlorthalidone 25 mg every morning  - Clonidine 0.1 mg BID  -Diltiazem 120 mg BID  -Losartan 100 mg daily  BP in clinic is 122/74  Followed by Vascular. Patient due for annual follow-up.  Continue current management and close follow-up    3. Paroxysmal atrial fibrillation (HCC)  Chronic, controlled with diltiazem and coumadin.  Continue close follow-up with Vascular.    4. Ischemic cerebrovascular accident (CVA) (HCC)  Occurred Aug. 24, 2009 secondary to undiagnosed Afib.   Deficits on right side. She currently uses a walker and has right leg weakness. She is interested in restarting physical therapy.  - Referral to Physical Therapy    5. Long term (current) use of anticoagulants [Z79.01]  6. Disequilibrium  7. Personal history of fall  - Referral to Physical Therapy    8. Elevated fasting glucose  Chronic, stable.  Mildly elevated fasting blood  sugar.  Will review A1C with annual labs.   - HEMOGLOBIN A1C; Future    9. Mixed hyperlipidemia  Chronic, controlled.  Managed on simvastatin 10 mg nightly  Due for labs  - Lipid Profile; Future    10. Obesity (BMI 30-39.9)  Chronic, improving.  Body mass index is 30.15 kg/m².  Continue healthy diet and lifestyle modification    Patient was educated in proper administration of medication(s) ordered today including safety, possible SE, risks, benefits, rationale and alternatives to therapy.   Supportive care, differential diagnoses, and indications for immediate follow-up discussed with patient.    Pathogenesis of diagnosis discussed including typical length and natural progression.    Instructed to return to clinic or nearest emergency department for any change in condition, further concerns, or worsening of symptoms.  Patient states understanding of the plan of care and discharge instructions.    Return in about 6 months (around 6/8/2023) for Follow-up chronic conditions.    I spent a total of 43 minutes with record review, exam, and communication with the patient, communication with other providers, and documentation of this encounter. This does not include time spent on separately billable procedures/tests.    I have placed the above orders and discussed them with an approved delegating provider.  The MA is performing the below orders under the direction of Dr. Sue.    Please note that this dictation was created using voice recognition software. I have worked with consultants from the vendor as well as technical experts from [employer] to optimize the interface. I have made every reasonable attempt to correct obvious errors, but I expect that there are errors of grammar and possibly content that I did not discover before finalizing the note.

## 2022-12-08 NOTE — ASSESSMENT & PLAN NOTE
Chronic, improving.  Body mass index is 30.15 kg/m².  Continue healthy diet and lifestyle modification

## 2022-12-20 ENCOUNTER — ANTICOAGULATION MONITORING (OUTPATIENT)
Dept: VASCULAR LAB | Facility: MEDICAL CENTER | Age: 72
End: 2022-12-20
Payer: MEDICARE

## 2022-12-20 DIAGNOSIS — I48.0 PAROXYSMAL ATRIAL FIBRILLATION (HCC): ICD-10-CM

## 2022-12-20 DIAGNOSIS — I63.9 ISCHEMIC CEREBROVASCULAR ACCIDENT (CVA) (HCC): Chronic | ICD-10-CM

## 2022-12-20 DIAGNOSIS — Z79.01 LONG TERM (CURRENT) USE OF ANTICOAGULANTS: ICD-10-CM

## 2022-12-20 LAB — INR PPP: 2.9 (ref 2–3.5)

## 2022-12-20 NOTE — PROGRESS NOTES
OP   Telephone Anticoagulation Service Note      Anticoagulation Summary  As of 2022      INR goal:  2.0-3.0   TTR:  82.8 % (2.4 y)   INR used for dosin.90 (2022)   Warfarin maintenance plan:  5 mg (5 mg x 1) every Wed; 10 mg (5 mg x 2) all other days; Starting 2022   Weekly warfarin total:  65 mg   Plan last modified:  Renny Christina PharmD (2022)   Next INR check:  1/3/2023   Target end date:  Indefinite    Indications    Paroxysmal atrial fibrillation (HCC) [I48.0]  Long term (current) use of anticoagulants [Z79.01] [Z79.01]  Ischemic cerebrovascular accident (CVA) (HCC) [I63.9]                 Anticoagulation Episode Summary       INR check location:  Anticoagulation Clinic    Preferred lab:      Send INR reminders to:      Comments:  HM  Only call if out of range          Anticoagulation Care Providers       Provider Role Specialty Phone number    NATALI PadillaRMIKI.  Nurse Practitioner Family [phone]          Anticoagulation Patient Findings    Patient requests a call ONLY if her INR is out of range.   INR is therapeutic today at 2.9.  Patient aware to call the clinic with any changes to diet or medications, with any signs/sx of bleeding or clotting or with any questions or concerns.     Patient will continue with the current dosing regimen and will follow up again in 2 weeks.     Lei SteinbergD

## 2023-01-03 ENCOUNTER — ANTICOAGULATION MONITORING (OUTPATIENT)
Dept: VASCULAR LAB | Facility: MEDICAL CENTER | Age: 73
End: 2023-01-03
Payer: MEDICARE

## 2023-01-03 DIAGNOSIS — Z79.01 LONG TERM (CURRENT) USE OF ANTICOAGULANTS: ICD-10-CM

## 2023-01-03 DIAGNOSIS — I63.9 ISCHEMIC CEREBROVASCULAR ACCIDENT (CVA) (HCC): Chronic | ICD-10-CM

## 2023-01-03 DIAGNOSIS — I48.0 PAROXYSMAL ATRIAL FIBRILLATION (HCC): ICD-10-CM

## 2023-01-03 LAB — INR PPP: 2.4 (ref 2–3.5)

## 2023-01-03 NOTE — PROGRESS NOTES
Anticoagulation Summary  As of 1/3/2023      INR goal:  2.0-3.0   TTR:  83.1 % (2.5 y)   INR used for dosin.40 (1/3/2023)   Warfarin maintenance plan:  5 mg (5 mg x 1) every Wed; 10 mg (5 mg x 2) all other days   Weekly warfarin total:  65 mg   Plan last modified:  Renny Christina PharmD (2022)   Next INR check:  2023   Target end date:  Indefinite    Indications    Paroxysmal atrial fibrillation (HCC) [I48.0]  Long term (current) use of anticoagulants [Z79.01] [Z79.01]  Ischemic cerebrovascular accident (CVA) (HCC) [I63.9]                 Anticoagulation Episode Summary       INR check location:  Anticoagulation Clinic    Preferred lab:      Send INR reminders to:      Comments:    Only call if out of range          Anticoagulation Care Providers       Provider Role Specialty Phone number    KRISTA Padilla.R.N.  Nurse Practitioner Family [phone]          Anticoagulation Patient Findings    INR therapeutic at 2.4.  Per chart notes, patient requests contact only when out of range.  Pt is to continue with current warfarin dosing regimen.  Follow up in 2 weeks, to reduce risk of adverse events related to this high risk medication,  Warfarin.    Nestor Burch, PharmD, BCACP

## 2023-01-17 ENCOUNTER — ANTICOAGULATION MONITORING (OUTPATIENT)
Dept: VASCULAR LAB | Facility: MEDICAL CENTER | Age: 73
End: 2023-01-17
Payer: MEDICARE

## 2023-01-17 DIAGNOSIS — Z79.01 LONG TERM (CURRENT) USE OF ANTICOAGULANTS: ICD-10-CM

## 2023-01-17 DIAGNOSIS — I63.9 ISCHEMIC CEREBROVASCULAR ACCIDENT (CVA) (HCC): Chronic | ICD-10-CM

## 2023-01-17 DIAGNOSIS — I48.0 PAROXYSMAL ATRIAL FIBRILLATION (HCC): ICD-10-CM

## 2023-01-17 LAB — INR PPP: 2.3 (ref 2–3.5)

## 2023-01-17 NOTE — PROGRESS NOTES
Anticoagulation Summary  As of 2023      INR goal:  2.0-3.0   TTR:  83.4 % (2.5 y)   INR used for dosin.30 (2023)   Warfarin maintenance plan:  5 mg (5 mg x 1) every Wed; 10 mg (5 mg x 2) all other days   Weekly warfarin total:  65 mg   Plan last modified:  Renny Christina PharmD (2022)   Next INR check:  2023   Target end date:  Indefinite    Indications    Paroxysmal atrial fibrillation (HCC) [I48.0]  Long term (current) use of anticoagulants [Z79.01] [Z79.01]  Ischemic cerebrovascular accident (CVA) (HCC) [I63.9]                 Anticoagulation Episode Summary       INR check location:  Anticoagulation Clinic    Preferred lab:      Send INR reminders to:      Comments:    Only call if out of range          Anticoagulation Care Providers       Provider Role Specialty Phone number    EVELIA Padilla.CAITIE.R.N.  Nurse Practitioner Family [phone]          Anticoagulation Patient Findings    INR therapeutic at 2.3.  Per chart notes, patient requests contact only when out of range.  Pt is to continue with current warfarin dosing regimen.  Follow up in 2 weeks, to reduce risk of adverse events related to this high risk medication,  Warfarin.    Nestor Burch, PharmD, BCACP

## 2023-01-31 ENCOUNTER — ANTICOAGULATION MONITORING (OUTPATIENT)
Dept: MEDICAL GROUP | Facility: PHYSICIAN GROUP | Age: 73
End: 2023-01-31
Payer: MEDICARE

## 2023-01-31 DIAGNOSIS — I48.0 PAROXYSMAL ATRIAL FIBRILLATION (HCC): ICD-10-CM

## 2023-01-31 DIAGNOSIS — I63.9 ISCHEMIC CEREBROVASCULAR ACCIDENT (CVA) (HCC): Chronic | ICD-10-CM

## 2023-01-31 DIAGNOSIS — Z79.01 LONG TERM (CURRENT) USE OF ANTICOAGULANTS: ICD-10-CM

## 2023-01-31 LAB — INR PPP: 2.1 (ref 2–3.5)

## 2023-01-31 NOTE — PROGRESS NOTES
Anticoagulation Summary  As of 2023      INR goal:  2.0-3.0   TTR:  83.6 % (2.5 y)   INR used for dosin.10 (2023)   Warfarin maintenance plan:  5 mg (5 mg x 1) every Wed; 10 mg (5 mg x 2) all other days   Weekly warfarin total:  65 mg   Plan last modified:  Renny Christina PharmD (2022)   Next INR check:  2023   Target end date:  Indefinite    Indications    Paroxysmal atrial fibrillation (HCC) [I48.0]  Long term (current) use of anticoagulants [Z79.01] [Z79.01]  Ischemic cerebrovascular accident (CVA) (HCC) [I63.9]                 Anticoagulation Episode Summary       INR check location:  Anticoagulation Clinic    Preferred lab:      Send INR reminders to:      Comments:    Only call if out of range          Anticoagulation Care Providers       Provider Role Specialty Phone number    EVELIA Padilla.CAITIE.R.N.  Nurse Practitioner Family [phone]          Anticoagulation Patient Findings    INR therapeutic at 2.1.  Per chart notes, patient requests contact only when out of range.  Pt is to continue with current warfarin dosing regimen.  Follow up in 2 weeks, to reduce risk of adverse events related to this high risk medication,  Warfarin.    Nestor Burch, PharmD, BCACP

## 2023-02-14 ENCOUNTER — ANTICOAGULATION MONITORING (OUTPATIENT)
Dept: VASCULAR LAB | Facility: MEDICAL CENTER | Age: 73
End: 2023-02-14
Payer: MEDICARE

## 2023-02-14 DIAGNOSIS — Z79.01 LONG TERM (CURRENT) USE OF ANTICOAGULANTS: ICD-10-CM

## 2023-02-14 DIAGNOSIS — I48.0 PAROXYSMAL ATRIAL FIBRILLATION (HCC): ICD-10-CM

## 2023-02-14 DIAGNOSIS — I63.9 ISCHEMIC CEREBROVASCULAR ACCIDENT (CVA) (HCC): Chronic | ICD-10-CM

## 2023-02-14 LAB — INR PPP: 2.3 (ref 2–3.5)

## 2023-02-14 NOTE — PROGRESS NOTES
Anticoagulation Summary  As of 2023      INR goal:  2.0-3.0   TTR:  83.9 % (2.6 y)   INR used for dosin.30 (2023)   Warfarin maintenance plan:  5 mg (5 mg x 1) every Wed; 10 mg (5 mg x 2) all other days   Weekly warfarin total:  65 mg   Plan last modified:  Renny Christina PharmD (2022)   Next INR check:  2023   Target end date:  Indefinite    Indications    Paroxysmal atrial fibrillation (HCC) [I48.0]  Long term (current) use of anticoagulants [Z79.01] [Z79.01]  Ischemic cerebrovascular accident (CVA) (HCC) [I63.9]                 Anticoagulation Episode Summary       INR check location:  Anticoagulation Clinic    Preferred lab:      Send INR reminders to:      Comments:    Only call if out of range          Anticoagulation Care Providers       Provider Role Specialty Phone number    KRISTA Padilla.RKimmyN.  Nurse Practitioner Family [phone]          Anticoagulation Patient Findings      Pt reported a therapeutic INR  Per chart review, pt does not want to be contacted if INR is therapeutic   Pt to continue with current warfarin dosing regimen. Requested pt contact the clinic for any s/s of unusual bleeding, bruising, clotting or any changes to diet or medication.    FU INR in 2 week(s).    Jolanta Barnett, IdrisD

## 2023-02-28 ENCOUNTER — ANTICOAGULATION MONITORING (OUTPATIENT)
Dept: VASCULAR LAB | Facility: MEDICAL CENTER | Age: 73
End: 2023-02-28
Payer: MEDICARE

## 2023-02-28 DIAGNOSIS — I63.9 ISCHEMIC CEREBROVASCULAR ACCIDENT (CVA) (HCC): Chronic | ICD-10-CM

## 2023-02-28 DIAGNOSIS — Z79.01 LONG TERM (CURRENT) USE OF ANTICOAGULANTS: ICD-10-CM

## 2023-02-28 DIAGNOSIS — I48.0 PAROXYSMAL ATRIAL FIBRILLATION (HCC): ICD-10-CM

## 2023-02-28 LAB — INR PPP: 2.2 (ref 2–3.5)

## 2023-02-28 NOTE — PROGRESS NOTES
Anticoagulation Summary  As of 2023      INR goal:  2.0-3.0   TTR:  84.1 % (2.6 y)   INR used for dosin.20 (2023)   Warfarin maintenance plan:  5 mg (5 mg x 1) every Wed; 10 mg (5 mg x 2) all other days   Weekly warfarin total:  65 mg   Plan last modified:  Renny Christina PharmD (2022)   Next INR check:  3/14/2023   Target end date:  Indefinite    Indications    Paroxysmal atrial fibrillation (HCC) [I48.0]  Long term (current) use of anticoagulants [Z79.01] [Z79.01]  Ischemic cerebrovascular accident (CVA) (HCC) [I63.9]                 Anticoagulation Episode Summary       INR check location:  Anticoagulation Clinic    Preferred lab:      Send INR reminders to:      Comments:    Only call if out of range          Anticoagulation Care Providers       Provider Role Specialty Phone number    KRISTA Padilla.RKimmyN.  Nurse Practitioner Family [phone]          Anticoagulation Patient Findings      Pt reported a therapeutic INR  Per chart review, pt does not want to be contacted if INR is therapeutic   Pt to continue with current warfarin dosing regimen. Requested pt contact the clinic for any s/s of unusual bleeding, bruising, clotting or any changes to diet or medication.    FU INR in 2 week(s).    Jolanta Barnett, IdrisD

## 2023-03-15 ENCOUNTER — ANTICOAGULATION MONITORING (OUTPATIENT)
Dept: VASCULAR LAB | Facility: MEDICAL CENTER | Age: 73
End: 2023-03-15
Payer: MEDICARE

## 2023-03-15 DIAGNOSIS — I48.0 PAROXYSMAL ATRIAL FIBRILLATION (HCC): ICD-10-CM

## 2023-03-15 DIAGNOSIS — Z79.01 LONG TERM (CURRENT) USE OF ANTICOAGULANTS: ICD-10-CM

## 2023-03-15 DIAGNOSIS — I63.9 ISCHEMIC CEREBROVASCULAR ACCIDENT (CVA) (HCC): Chronic | ICD-10-CM

## 2023-03-15 LAB — INR PPP: 1.9 (ref 2–3.5)

## 2023-03-15 NOTE — PROGRESS NOTES
Anticoagulation Summary  As of 3/15/2023      INR goal:  2.0-3.0   TTR:  83.8 % (2.7 y)   INR used for dosin.90 (3/15/2023)   Warfarin maintenance plan:  5 mg (5 mg x 1) every Wed; 10 mg (5 mg x 2) all other days   Weekly warfarin total:  65 mg   Plan last modified:  Renny Christina PharmD (2022)   Next INR check:  3/29/2023   Target end date:  Indefinite    Indications    Paroxysmal atrial fibrillation (HCC) [I48.0]  Long term (current) use of anticoagulants [Z79.01] [Z79.01]  Ischemic cerebrovascular accident (CVA) (HCC) [I63.9]                 Anticoagulation Episode Summary       INR check location:  Anticoagulation Clinic    Preferred lab:      Send INR reminders to:      Comments:    Only call if out of range          Anticoagulation Care Providers       Provider Role Specialty Phone number    EVELIA Padilla.P.R.N.  Nurse Practitioner Family [phone]          Anticoagulation Patient Findings  Patient Findings       Positives:  Change in diet/appetite (bit more salads this past couple weeks)    Negatives:  Signs/symptoms of thrombosis, Signs/symptoms of bleeding, Laboratory test error suspected, Change in health, Change in alcohol use, Change in activity, Upcoming invasive procedure, Emergency department visit, Upcoming dental procedure, Missed doses, Extra doses, Change in medications, Hospital admission, Bruising, Other complaints          Spoke with patient today regarding subtherapeutic INR of 1.9.  Patient denies any signs/symptoms of bruising or bleeding or any changes in medications.  Instructed patient to call clinic with any questions or concerns.    Pt is not on antiplatelet therapy    Pt is to bolus with 7.5mg X 1, then continue with current warfarin dosing regimen.  Follow up in 2 weeks, to reduce risk of adverse events related to this high risk medication,  Warfarin.    Nestor Burch, PharmD, BCACP

## 2023-03-23 ASSESSMENT — ENCOUNTER SYMPTOMS
NEAR-SYNCOPE: 0
VOMITING: 0
SYNCOPE: 0
FEVER: 0
PALPITATIONS: 0
WEAKNESS: 0
DIZZINESS: 0
DYSPNEA ON EXERTION: 0
NAUSEA: 0
ORTHOPNEA: 0
DIARRHEA: 0
FOCAL WEAKNESS: 0
ABDOMINAL PAIN: 0
SHORTNESS OF BREATH: 0
COUGH: 0
NIGHT SWEATS: 0
PND: 0
IRREGULAR HEARTBEAT: 0
WHEEZING: 0

## 2023-03-23 NOTE — PROGRESS NOTES
Cardiology Follow-up Consultation Note    Date of note:    03/30/23  Primary Care Provider: MILO Padilla     Patient Name: Tonya Del Valle     YOB: 1950  MRN:              [id number]    Chief Complaint: Follow up    History of Present Illness: Ms. Tonya Del Valle is a 72 y.o. female whose current medical problems include afib on anticoagulation, dyslipidemia, HTN, and CVA who is here for follow up afib.     The patient was last seen in my clinic on 10/11/2021 via telemedicine clinic (her initial visit with me, previously seen by by Dr. Malik).  She was doing well during the last visit, and no changes were made to her medications.    The patient returns today for follow up.  She presents with her .  She is not very active due to prior stroke but does walk with a walker. She denies any chest pain or shortness of breath with exertion. No orthopnea/PND/leg swelling. No palpitations.  No syncope or presyncopal episodes.  The patient tells me that she has lost about 35 lbs since the last visit from watching her diet (after our discussion last visit).    Cardiovascular Risk Factors:  1. Smoking status: Never smoker  2. Type II Diabetes Mellitus: diet controlled  Lab Results   Component Value Date/Time    HBA1C 5.7 (H) 08/17/2021 06:58 AM     3. Hypertension: On medication  4. Dyslipidemia: On statin   Cholesterol,Tot   Date Value Ref Range Status   05/10/2021 136 100 - 199 mg/dL Final     LDL   Date Value Ref Range Status   05/10/2021 80 <100 mg/dL Final     HDL   Date Value Ref Range Status   05/10/2021 42 >=40 mg/dL Final     Triglycerides   Date Value Ref Range Status   05/10/2021 70 0 - 149 mg/dL Final     5. Family history of early Coronary Artery Disease in a first degree relative (Male less than 55 years of age; Female less than 65 years of age): Mother with possible at age 58  6.  Obesity and/or Metabolic Syndrome: Body mass index is 29.5 kg/m².   7. Sedentary lifestyle: Not  "sedentary    Review of Systems   Constitutional: Negative for fever, malaise/fatigue and night sweats.   Cardiovascular:  Negative for chest pain, dyspnea on exertion, irregular heartbeat, leg swelling, near-syncope, orthopnea, palpitations, paroxysmal nocturnal dyspnea and syncope.   Respiratory:  Negative for cough, shortness of breath and wheezing.    Gastrointestinal:  Negative for abdominal pain, diarrhea, nausea and vomiting.   Neurological:  Negative for dizziness, focal weakness and weakness.     All other systems reviewed and are negative.         Current Outpatient Medications   Medication Sig Dispense Refill    DILTIAZem (CARDIZEM) 120 MG Tab Take 1 Tablet by mouth 2 times a day. 180 Tablet 3    carvedilol (COREG) 25 MG Tab Take 2 Tablets by mouth 2 times a day. 360 Tablet 3    chlorthalidone (HYGROTON) 25 MG Tab Take 1 Tablet by mouth every morning. 90 Tablet 3    losartan (COZAAR) 100 MG Tab Take 1 Tablet by mouth every day. 90 Tablet 3    simvastatin (ZOCOR) 10 MG Tab Take 1 Tablet by mouth every evening. 90 Tablet 3    warfarin (COUMADIN) 5 MG Tab TAKE 1 TO 2 TABLETS EVERY DAY AS DIRECTED BY [facility] 180 Tablet 1    cloNIDine (CATAPRES) 0.1 MG Tab Take 1 Tablet by mouth 2 times a day. 180 Tablet 3    Omega-3 Fatty Acids (OMEGA 3 PO) Take  by mouth.      loratadine (CLARITIN) 10 MG Tab Take 10 mg by mouth every day.       No current facility-administered medications for this visit.         Allergies   Allergen Reactions    Sulfa Drugs Rash       Physical Exam:  Ambulatory Vitals  BP (!) 130/94 (BP Location: Left arm, Patient Position: Sitting, BP Cuff Size: Adult)   Pulse 73   Resp 16   Ht 1.664 m (5' 5.5\")   Wt 81.6 kg (180 lb)   SpO2 93%    Oxygen Therapy:  Pulse Oximetry: 93 %  BP Readings from Last 4 Encounters:   03/30/23 (!) 130/94   12/08/22 122/74   08/15/22 118/72   05/24/22 120/80       Weight/BMI: Body mass index is 29.5 kg/m².  Wt Readings from Last 4 Encounters: "   03/30/23 81.6 kg (180 lb)   12/08/22 83.5 kg (184 lb)   08/15/22 86.2 kg (190 lb)   05/24/22 90.6 kg (199 lb 12.8 oz)       General: Well appearing and in no apparent distress  Eyes: nl conjunctiva, no icteric sclera  ENT: wearing a mask, normal external appearance of ears  Neck: no visible JVP,  no carotid bruits  Lungs: normal respiratory effort, CTAB  Heart: RRR, no murmurs, no rubs or gallops,  no edema bilateral lower extremities. No LV/RV heave on cardiac palpatation. + bilateral radial pulses.  + bilateral dp pulses.   Abdomen: soft, non tender, non distended, no masses, normal bowel sounds.  No HSM.  Extremities/MSK: no clubbing, no cyanosis  Neurological: No focal sensory deficits  Psychiatric: Appropriate affect, A/O x 3, intact judgement and insight  Skin: Warm extremities      Lab Data Review:  Lab Results   Component Value Date/Time    CHOLSTRLTOT 141 05/21/2022 07:20 AM    LDL 82 05/21/2022 07:20 AM    HDL 39 (A) 05/21/2022 07:20 AM    TRIGLYCERIDE 99 05/21/2022 07:20 AM       Lab Results   Component Value Date/Time    SODIUM 140 05/21/2022 07:20 AM    POTASSIUM 4.2 05/21/2022 07:20 AM    CHLORIDE 100 05/21/2022 07:20 AM    CO2 30 05/21/2022 07:20 AM    GLUCOSE 106 (H) 05/21/2022 07:20 AM    BUN 22 05/21/2022 07:20 AM    CREATININE 0.83 05/21/2022 07:20 AM     Lab Results   Component Value Date/Time    ALKPHOSPHAT 51 05/21/2022 07:20 AM    ASTSGOT 13 05/21/2022 07:20 AM    ALTSGPT 10 05/21/2022 07:20 AM    TBILIRUBIN 0.6 05/21/2022 07:20 AM      Lab Results   Component Value Date/Time    WBC 5.2 05/10/2021 07:17 AM     Lab Results   Component Value Date/Time    HBA1C 5.7 (H) 08/17/2021 06:58 AM         Cardiac Imaging and Procedures Review:    EKG normal from Dr. Malik's note    Echocardiogram 7/2019 - normal as per prior cardiologist note      Assessment & Plan     1. Paroxysmal atrial fibrillation (HCC)        2. Long term (current) use of anticoagulants        3. Essential hypertension        4.  Dyslipidemia              Shared Medical Decision Making:    Paroxysmal afib  Asymptomatic. [id number] 5 (age, female, HTN, CVAx2).    -Continue diltiazem 120mg twice daily and carvedilol 50mg tiwce daily.   -Continue warfarin    HTN  BP diastolic borderline elevated this visit.   -Continue losartan 100mg daily, clonidine 0.2mg daily, chlorthalidone 25 mg daily  -Continue diltiazem and carvedilol as above  -Counseled the patient to measure BP at home.  If home BPs remains above 130/80, will plan to increase chlorthalidone to 37.5 mg daily.    Dyslipidemia  Lites within normal limits last labs.  -Continue simvastatin 10mg daily    All of the patient's excellent questions were answered to the best of my knowledge and to her satisfaction.  It was a pleasure seeing Ms. Tonya Del Valle in my clinic today. Return in about 6 months (around 9/30/2023). Patient is aware to call the cardiology clinic with any questions or concerns.      Isabella Mcleod MD  [facility]  [facility], [facility]  [address] [address]  Phone: [phone]  Fax: [phone]

## 2023-03-29 ENCOUNTER — ANTICOAGULATION MONITORING (OUTPATIENT)
Dept: VASCULAR LAB | Facility: MEDICAL CENTER | Age: 73
End: 2023-03-29
Payer: MEDICARE

## 2023-03-29 DIAGNOSIS — I48.0 PAROXYSMAL ATRIAL FIBRILLATION (HCC): ICD-10-CM

## 2023-03-29 DIAGNOSIS — Z79.01 LONG TERM (CURRENT) USE OF ANTICOAGULANTS: ICD-10-CM

## 2023-03-29 DIAGNOSIS — I63.9 ISCHEMIC CEREBROVASCULAR ACCIDENT (CVA) (HCC): Chronic | ICD-10-CM

## 2023-03-29 LAB — INR PPP: 2.1 (ref 2–3.5)

## 2023-03-29 NOTE — PROGRESS NOTES
Anticoagulation Summary  As of 3/29/2023      INR goal:  2.0-3.0   TTR:  83.4 % (2.7 y)   INR used for dosin.10 (3/29/2023)   Warfarin maintenance plan:  5 mg (5 mg x 1) every Wed; 10 mg (5 mg x 2) all other days   Weekly warfarin total:  65 mg   Plan last modified:  Renny Christina PharmD (2022)   Next INR check:  2023   Target end date:  Indefinite    Indications    Paroxysmal atrial fibrillation (HCC) [I48.0]  Long term (current) use of anticoagulants [Z79.01] [Z79.01]  Ischemic cerebrovascular accident (CVA) (HCC) [I63.9]                 Anticoagulation Episode Summary       INR check location:  Anticoagulation Clinic    Preferred lab:      Send INR reminders to:      Comments:    Only call if out of range          Anticoagulation Care Providers       Provider Role Specialty Phone number    KRISTA Padilla.R.N.  Nurse Practitioner Family [phone]            Refer to Anticoagulation Patient Findings for HPI    INR therapeutic at 2.1.      Per chart review pt prefers no phone call if INR in range.    Pt to continue with current warfarin dosing regimen.     Will follow up in 2 week(s).     Renny Christina PharmD, BCACP

## 2023-03-30 ENCOUNTER — OFFICE VISIT (OUTPATIENT)
Dept: CARDIOLOGY | Facility: MEDICAL CENTER | Age: 73
End: 2023-03-30
Payer: MEDICARE

## 2023-03-30 VITALS
SYSTOLIC BLOOD PRESSURE: 130 MMHG | DIASTOLIC BLOOD PRESSURE: 94 MMHG | WEIGHT: 180 LBS | OXYGEN SATURATION: 93 % | HEIGHT: 66 IN | HEART RATE: 73 BPM | BODY MASS INDEX: 28.93 KG/M2 | RESPIRATION RATE: 16 BRPM

## 2023-03-30 DIAGNOSIS — Z79.01 LONG TERM (CURRENT) USE OF ANTICOAGULANTS: ICD-10-CM

## 2023-03-30 DIAGNOSIS — E78.5 DYSLIPIDEMIA: ICD-10-CM

## 2023-03-30 DIAGNOSIS — I10 ESSENTIAL HYPERTENSION: ICD-10-CM

## 2023-03-30 DIAGNOSIS — I48.0 PAROXYSMAL ATRIAL FIBRILLATION (HCC): ICD-10-CM

## 2023-03-30 PROCEDURE — 99214 OFFICE O/P EST MOD 30 MIN: CPT | Performed by: STUDENT IN AN ORGANIZED HEALTH CARE EDUCATION/TRAINING PROGRAM

## 2023-03-30 ASSESSMENT — FIBROSIS 4 INDEX: FIB4 SCORE: 1.25

## 2023-03-30 NOTE — PATIENT INSTRUCTIONS
Measure blood pressure at home. If it remains above 130/80, please contact us so we can increase one of your medications.

## 2023-04-05 DIAGNOSIS — Z79.01 CHRONIC ANTICOAGULATION: ICD-10-CM

## 2023-04-05 DIAGNOSIS — I10 ESSENTIAL HYPERTENSION: ICD-10-CM

## 2023-04-06 RX ORDER — WARFARIN SODIUM 5 MG/1
TABLET ORAL
Qty: 180 TABLET | Refills: 1 | Status: SHIPPED | OUTPATIENT
Start: 2023-04-06 | End: 2024-01-29 | Stop reason: SDUPTHER

## 2023-04-06 RX ORDER — CARVEDILOL 25 MG/1
50 TABLET ORAL 2 TIMES DAILY
Qty: 360 TABLET | Refills: 3 | Status: SHIPPED | OUTPATIENT
Start: 2023-04-06

## 2023-04-06 NOTE — TELEPHONE ENCOUNTER
Received request via: Pharmacy    Was the patient seen in the last year in this department? Yes    Does the patient have an active prescription (recently filled or refills available) for medication(s) requested? No    Does the patient have retirement Plus and need 100 day supply (blood pressure, diabetes and cholesterol meds only)? Patient does not have SCP    Last Office Visit:05/24/2022  Last Labs:0521/2022

## 2023-04-12 ENCOUNTER — ANTICOAGULATION MONITORING (OUTPATIENT)
Dept: VASCULAR LAB | Facility: MEDICAL CENTER | Age: 73
End: 2023-04-12
Payer: MEDICARE

## 2023-04-12 DIAGNOSIS — I63.9 ISCHEMIC CEREBROVASCULAR ACCIDENT (CVA) (HCC): Chronic | ICD-10-CM

## 2023-04-12 DIAGNOSIS — Z79.01 LONG TERM (CURRENT) USE OF ANTICOAGULANTS: ICD-10-CM

## 2023-04-12 DIAGNOSIS — I48.0 PAROXYSMAL ATRIAL FIBRILLATION (HCC): ICD-10-CM

## 2023-04-12 LAB — INR PPP: 2.1 (ref 2–3.5)

## 2023-04-12 NOTE — PROGRESS NOTES
Anticoagulation Summary  As of 2023      INR goal:  2.0-3.0   TTR:  83.6 % (2.7 y)   INR used for dosin.10 (2023)   Warfarin maintenance plan:  5 mg (5 mg x 1) every Wed; 10 mg (5 mg x 2) all other days   Weekly warfarin total:  65 mg   Plan last modified:  Renny Christina PharmD (2022)   Next INR check:  2023   Target end date:  Indefinite    Indications    Paroxysmal atrial fibrillation (HCC) [I48.0]  Long term (current) use of anticoagulants [Z79.01] [Z79.01]  Ischemic cerebrovascular accident (CVA) (HCC) [I63.9]                 Anticoagulation Episode Summary       INR check location:  Anticoagulation Clinic    Preferred lab:      Send INR reminders to:      Comments:    Only call if out of range          Anticoagulation Care Providers       Provider Role Specialty Phone number    KRISTA Padilla.R.N.  Nurse Practitioner Family [phone]            Refer to Anticoagulation Patient Findings for HPI    INR therapeutic at 2.1.      Per chart review pt prefers no phone call if INR in range.    Pt to continue with current warfarin dosing regimen.     Will follow up in 2 week(s).     Renny Christina PharmD, BCACP

## 2023-04-26 ENCOUNTER — ANTICOAGULATION MONITORING (OUTPATIENT)
Dept: VASCULAR LAB | Facility: MEDICAL CENTER | Age: 73
End: 2023-04-26
Payer: MEDICARE

## 2023-04-26 DIAGNOSIS — Z79.01 LONG TERM (CURRENT) USE OF ANTICOAGULANTS: ICD-10-CM

## 2023-04-26 DIAGNOSIS — I63.9 ISCHEMIC CEREBROVASCULAR ACCIDENT (CVA) (HCC): Chronic | ICD-10-CM

## 2023-04-26 DIAGNOSIS — I48.0 PAROXYSMAL ATRIAL FIBRILLATION (HCC): ICD-10-CM

## 2023-04-26 LAB — INR PPP: 2.6 (ref 2–3.5)

## 2023-04-26 NOTE — PROGRESS NOTES
Anticoagulation Summary  As of 2023      INR goal:  2.0-3.0   TTR:  83.8 % (2.8 y)   INR used for dosin.60 (2023)   Warfarin maintenance plan:  5 mg (5 mg x 1) every Wed; 10 mg (5 mg x 2) all other days   Weekly warfarin total:  65 mg   Plan last modified:  Renny Christina PharmD (2022)   Next INR check:  5/10/2023   Target end date:  Indefinite    Indications    Paroxysmal atrial fibrillation (HCC) [I48.0]  Long term (current) use of anticoagulants [Z79.01] [Z79.01]  Ischemic cerebrovascular accident (CVA) (HCC) [I63.9]                 Anticoagulation Episode Summary       INR check location:  Anticoagulation Clinic    Preferred lab:      Send INR reminders to:      Comments:    Only call if out of range          Anticoagulation Care Providers       Provider Role Specialty Phone number    KRISTA Padilla.RKimmyN.  Nurse Practitioner Family [phone]          Anticoagulation Patient Findings      Pt reported a therapeutic INR  Per chart review, pt does not want to be contacted if INR is therapeutic   Pt to continue with current warfarin dosing regimen. Requested pt contact the clinic for any s/s of unusual bleeding, bruising, clotting or any changes to diet or medication.    FU INR in 2 week(s).    Jolanta Barnett, IdrisD

## 2023-05-10 LAB — INR PPP: 2.6 (ref 2–3.5)

## 2023-05-11 ENCOUNTER — ANTICOAGULATION MONITORING (OUTPATIENT)
Dept: VASCULAR LAB | Facility: MEDICAL CENTER | Age: 73
End: 2023-05-11
Payer: MEDICARE

## 2023-05-11 DIAGNOSIS — I63.9 ISCHEMIC CEREBROVASCULAR ACCIDENT (CVA) (HCC): Chronic | ICD-10-CM

## 2023-05-11 DIAGNOSIS — I48.0 PAROXYSMAL ATRIAL FIBRILLATION (HCC): ICD-10-CM

## 2023-05-11 DIAGNOSIS — Z79.01 LONG TERM (CURRENT) USE OF ANTICOAGULANTS: ICD-10-CM

## 2023-05-11 NOTE — PROGRESS NOTES
Anticoagulation Summary  As of 2023      INR goal:  2.0-3.0   TTR:  84.0 % (2.8 y)   INR used for dosin.60 (5/10/2023)   Warfarin maintenance plan:  5 mg (5 mg x 1) every Wed; 10 mg (5 mg x 2) all other days   Weekly warfarin total:  65 mg   Plan last modified:  Renny Christina PharmD (2022)   Next INR check:  2023   Target end date:  Indefinite    Indications    Paroxysmal atrial fibrillation (HCC) [I48.0]  Long term (current) use of anticoagulants [Z79.01] [Z79.01]  Ischemic cerebrovascular accident (CVA) (HCC) [I63.9]                 Anticoagulation Episode Summary       INR check location:  Anticoagulation Clinic    Preferred lab:      Send INR reminders to:      Comments:    Only call if out of range          Anticoagulation Care Providers       Provider Role Specialty Phone number    KRISTA Padilla.RKimmyN.  Nurse Practitioner Family [phone]          Anticoagulation Patient Findings      Pt reported a therapeutic INR  Per chart review, pt does not want to be contacted if INR is therapeutic   Pt to continue with current warfarin dosing regimen. Requested pt contact the clinic for any s/s of unusual bleeding, bruising, clotting or any changes to diet or medication.    FU INR in 2 week(s).    Jolanta Barnett, IdrisD

## 2023-05-25 ENCOUNTER — ANTICOAGULATION MONITORING (OUTPATIENT)
Dept: VASCULAR LAB | Facility: MEDICAL CENTER | Age: 73
End: 2023-05-25
Payer: MEDICARE

## 2023-05-25 DIAGNOSIS — Z79.01 LONG TERM (CURRENT) USE OF ANTICOAGULANTS: ICD-10-CM

## 2023-05-25 DIAGNOSIS — I48.0 PAROXYSMAL ATRIAL FIBRILLATION (HCC): ICD-10-CM

## 2023-05-25 DIAGNOSIS — I63.9 ISCHEMIC CEREBROVASCULAR ACCIDENT (CVA) (HCC): Chronic | ICD-10-CM

## 2023-05-25 LAB — INR PPP: 2.2 (ref 2–3.5)

## 2023-05-25 NOTE — PROGRESS NOTES
Anticoagulation Summary  As of 2023      INR goal:  2.0-3.0   TTR:  84.3 % (2.8 y)   INR used for dosin.20 (2023)   Warfarin maintenance plan:  5 mg (5 mg x 1) every Wed; 10 mg (5 mg x 2) all other days   Weekly warfarin total:  65 mg   Plan last modified:  Renny Christina PharmD (2022)   Next INR check:  2023   Target end date:  Indefinite    Indications    Paroxysmal atrial fibrillation (HCC) [I48.0]  Long term (current) use of anticoagulants [Z79.01] [Z79.01]  Ischemic cerebrovascular accident (CVA) (HCC) [I63.9]                 Anticoagulation Episode Summary       INR check location:  Anticoagulation Clinic    Preferred lab:      Send INR reminders to:      Comments:    Only call if out of range          Anticoagulation Care Providers       Provider Role Specialty Phone number    KRISTA Padilla.R.N.  Nurse Practitioner Family [phone]            Refer to Anticoagulation Patient Findings for HPI    INR therapeutic at 2.2.      Per chart review pt prefers no phone call if INR in range.    Pt to continue with current warfarin dosing regimen.     Will follow up in 2 week(s).     Renny Christina PharmD, BCACP

## 2023-06-09 ENCOUNTER — ANTICOAGULATION MONITORING (OUTPATIENT)
Dept: VASCULAR LAB | Facility: MEDICAL CENTER | Age: 73
End: 2023-06-09
Payer: MEDICARE

## 2023-06-09 DIAGNOSIS — Z79.01 LONG TERM (CURRENT) USE OF ANTICOAGULANTS: ICD-10-CM

## 2023-06-09 DIAGNOSIS — I48.0 PAROXYSMAL ATRIAL FIBRILLATION (HCC): ICD-10-CM

## 2023-06-09 DIAGNOSIS — I63.9 ISCHEMIC CEREBROVASCULAR ACCIDENT (CVA) (HCC): Chronic | ICD-10-CM

## 2023-06-09 LAB — INR PPP: 1.7 (ref 2–3.5)

## 2023-06-09 NOTE — PROGRESS NOTES
OP Anticoagulation Service Note    Date: 2023    Anticoagulation Summary  As of 2023      INR goal:  2.0-3.0   TTR:  83.6 % (2.9 y)   INR used for dosin.70 (2023)   Warfarin maintenance plan:  5 mg (5 mg x 1) every Wed; 10 mg (5 mg x 2) all other days   Weekly warfarin total:  65 mg   Plan last modified:  Renny Christina PharmD (2022)   Next INR check:  2023   Target end date:  Indefinite    Indications    Paroxysmal atrial fibrillation (HCC) [I48.0]  Long term (current) use of anticoagulants [Z79.01] [Z79.01]  Ischemic cerebrovascular accident (CVA) (HCC) [I63.9]                 Anticoagulation Episode Summary       INR check location:  Anticoagulation Clinic    Preferred lab:      Send INR reminders to:      Comments:    Only call if out of range          Anticoagulation Care Providers       Provider Role Specialty Phone number    NATALI PadillaRKimmyNKimmy  Nurse Practitioner Family [phone]          Anticoagulation Patient Findings        Patient's preferred phone number:  [phone]        HPI:   The reason for today's call is to prevent morbidity and mortality from a blood clot and/or stroke and to reduce the risk of bleeding while on a anticoagulant.     PCP:  MILO Renee  [address] [place] [phone]    Assessment:     INR  sub-therapeutic.     Lab Results   Component Value Date/Time    BUN 22 2022 07:20 AM    CREATININE 0.83 2022 07:20 AM     Lab Results   Component Value Date/Time    HEMOGLOBIN 15.6 05/10/2021 07:17 AM    HEMATOCRIT 48.3 (H) 05/10/2021 07:17 AM    PLATELETCT 240 05/10/2021 07:17 AM    ALKPHOSPHAT 51 2022 07:20 AM    ASTSGOT 13 2022 07:20 AM    ALTSGPT 10 2022 07:20 AM          Current Outpatient Medications:     carvedilol, 50 mg, Oral, BID    warfarin, TAKE 1 TO 2 TABLETS EVERY DAY AS DIRECTED BY [facility]    dilTIAZem, 120 mg, Oral, BID    chlorthalidone, 25 mg,  Oral, QAM    losartan, 100 mg, Oral, QDAY    simvastatin, 10 mg, Oral, Q EVENING    cloNIDine, 0.1 mg, Oral, BID    Omega-3 Fatty Acids (OMEGA 3 PO), Take  by mouth.    loratadine, 10 mg, Oral, DAILY      Plan:     15mg today then continue the same warfarin dose, as noted above.       Follow-up:     On date seen above    Additional information discussed with patient:     Asked patient to please call the anticoagulation clinic if they have any signs/symptoms of bleeding and/or thrombosis or any changes to diet or medications.      National recommendations regarding anticoagulation therapy:     The CHEST guidelines recommends frequent INR monitoring at regular intervals (a few days up to a max of 12 weeks) to ensure patients are on the proper dose of warfarin, and patients are not having any complications from therapy.  INRs can dramatically change over a short time period due to diet, medications, and medical conditions.       [facility]  Phone: [phone]  Fax: [phone]  On call: [phone]  General scheduling/information [phone]  For emergencies please dial [phone]  Please do not use [employer] for urgent matters, call the phone numbers listed above.    This note was created using voice recognition software (Dragon). The accuracy of the dictation is limited by the abilities of the software. I have reviewed the note prior to signing, however some errors in grammar and context are still possible. If you have any questions related to this note please do not hesitate to contact our office.

## 2023-06-22 ENCOUNTER — ANTICOAGULATION MONITORING (OUTPATIENT)
Dept: VASCULAR LAB | Facility: MEDICAL CENTER | Age: 73
End: 2023-06-22
Payer: MEDICARE

## 2023-06-22 DIAGNOSIS — I63.9 ISCHEMIC CEREBROVASCULAR ACCIDENT (CVA) (HCC): Chronic | ICD-10-CM

## 2023-06-22 DIAGNOSIS — Z79.01 LONG TERM (CURRENT) USE OF ANTICOAGULANTS: ICD-10-CM

## 2023-06-22 DIAGNOSIS — I48.0 PAROXYSMAL ATRIAL FIBRILLATION (HCC): ICD-10-CM

## 2023-06-22 LAB — INR PPP: 2.4 (ref 2–3.5)

## 2023-06-22 NOTE — PROGRESS NOTES
Anticoagulation Summary  As of 2023      INR goal:  2.0-3.0   TTR:  83.3 % (2.9 y)   INR used for dosin.40 (2023)   Warfarin maintenance plan:  5 mg (5 mg x 1) every Wed; 10 mg (5 mg x 2) all other days   Weekly warfarin total:  65 mg   Plan last modified:  Renny Christina PharmD (2022)   Next INR check:  2023   Target end date:  Indefinite    Indications    Paroxysmal atrial fibrillation (HCC) [I48.0]  Long term (current) use of anticoagulants [Z79.01] [Z79.01]  Ischemic cerebrovascular accident (CVA) (HCC) [I63.9]                 Anticoagulation Episode Summary       INR check location:  Anticoagulation Clinic    Preferred lab:      Send INR reminders to:      Comments:    Only call if out of range          Anticoagulation Care Providers       Provider Role Specialty Phone number    KRISTA Padilla.R.N.  Nurse Practitioner Family [phone]            Refer to Anticoagulation Patient Findings for HPI    INR therapeutic at 2.4.      Per chart review pt prefers no phone call if INR in range.    Pt to continue with current warfarin dosing regimen.     Will follow up in 2 week(s).     Renny Christina PharmD, BCACP

## 2023-07-01 DIAGNOSIS — I48.0 PAROXYSMAL ATRIAL FIBRILLATION (HCC): ICD-10-CM

## 2023-07-05 ENCOUNTER — HOSPITAL ENCOUNTER (OUTPATIENT)
Dept: LAB | Facility: MEDICAL CENTER | Age: 73
End: 2023-07-05
Payer: MEDICARE

## 2023-07-05 DIAGNOSIS — R73.01 ELEVATED FASTING GLUCOSE: ICD-10-CM

## 2023-07-05 DIAGNOSIS — Z00.00 ENCOUNTER FOR MEDICAL EXAMINATION TO ESTABLISH CARE: ICD-10-CM

## 2023-07-05 DIAGNOSIS — E78.2 MIXED HYPERLIPIDEMIA: ICD-10-CM

## 2023-07-05 LAB
25(OH)D3 SERPL-MCNC: 37 NG/ML (ref 30–100)
ALBUMIN SERPL BCP-MCNC: 4.2 G/DL (ref 3.2–4.9)
ALBUMIN/GLOB SERPL: 1.5 G/DL
ALP SERPL-CCNC: 55 U/L (ref 30–99)
ALT SERPL-CCNC: 12 U/L (ref 2–50)
ANION GAP SERPL CALC-SCNC: 12 MMOL/L (ref 7–16)
AST SERPL-CCNC: 16 U/L (ref 12–45)
BASOPHILS # BLD AUTO: 0.6 % (ref 0–1.8)
BASOPHILS # BLD: 0.03 K/UL (ref 0–0.12)
BILIRUB SERPL-MCNC: 0.5 MG/DL (ref 0.1–1.5)
BUN SERPL-MCNC: 33 MG/DL (ref 8–22)
CALCIUM ALBUM COR SERPL-MCNC: 9.9 MG/DL (ref 8.5–10.5)
CALCIUM SERPL-MCNC: 10.1 MG/DL (ref 8.5–10.5)
CHLORIDE SERPL-SCNC: 101 MMOL/L (ref 96–112)
CHOLEST SERPL-MCNC: 167 MG/DL (ref 100–199)
CO2 SERPL-SCNC: 27 MMOL/L (ref 20–33)
CREAT SERPL-MCNC: 0.77 MG/DL (ref 0.5–1.4)
EOSINOPHIL # BLD AUTO: 0.21 K/UL (ref 0–0.51)
EOSINOPHIL NFR BLD: 4 % (ref 0–6.9)
ERYTHROCYTE [DISTWIDTH] IN BLOOD BY AUTOMATED COUNT: 48.7 FL (ref 35.9–50)
EST. AVERAGE GLUCOSE BLD GHB EST-MCNC: 117 MG/DL
FASTING STATUS PATIENT QL REPORTED: NORMAL
GFR SERPLBLD CREATININE-BSD FMLA CKD-EPI: 81 ML/MIN/1.73 M 2
GLOBULIN SER CALC-MCNC: 2.8 G/DL (ref 1.9–3.5)
GLUCOSE SERPL-MCNC: 106 MG/DL (ref 65–99)
HBA1C MFR BLD: 5.7 % (ref 4–5.6)
HCT VFR BLD AUTO: 47.7 % (ref 37–47)
HDLC SERPL-MCNC: 48 MG/DL
HGB BLD-MCNC: 15.3 G/DL (ref 12–16)
IMM GRANULOCYTES # BLD AUTO: 0.02 K/UL (ref 0–0.11)
IMM GRANULOCYTES NFR BLD AUTO: 0.4 % (ref 0–0.9)
LDLC SERPL CALC-MCNC: 104 MG/DL
LYMPHOCYTES # BLD AUTO: 1.58 K/UL (ref 1–4.8)
LYMPHOCYTES NFR BLD: 29.9 % (ref 22–41)
MCH RBC QN AUTO: 29.9 PG (ref 27–33)
MCHC RBC AUTO-ENTMCNC: 32.1 G/DL (ref 32.2–35.5)
MCV RBC AUTO: 93.2 FL (ref 81.4–97.8)
MONOCYTES # BLD AUTO: 0.45 K/UL (ref 0–0.85)
MONOCYTES NFR BLD AUTO: 8.5 % (ref 0–13.4)
NEUTROPHILS # BLD AUTO: 3 K/UL (ref 1.82–7.42)
NEUTROPHILS NFR BLD: 56.6 % (ref 44–72)
NRBC # BLD AUTO: 0 K/UL
NRBC BLD-RTO: 0 /100 WBC (ref 0–0.2)
PLATELET # BLD AUTO: 232 K/UL (ref 164–446)
PMV BLD AUTO: 10.6 FL (ref 9–12.9)
POTASSIUM SERPL-SCNC: 3.8 MMOL/L (ref 3.6–5.5)
PROT SERPL-MCNC: 7 G/DL (ref 6–8.2)
RBC # BLD AUTO: 5.12 M/UL (ref 4.2–5.4)
SODIUM SERPL-SCNC: 140 MMOL/L (ref 135–145)
TRIGL SERPL-MCNC: 73 MG/DL (ref 0–149)
TSH SERPL DL<=0.005 MIU/L-ACNC: 2.78 UIU/ML (ref 0.38–5.33)
WBC # BLD AUTO: 5.3 K/UL (ref 4.8–10.8)

## 2023-07-05 PROCEDURE — 82306 VITAMIN D 25 HYDROXY: CPT | Mod: GA

## 2023-07-05 PROCEDURE — 36415 COLL VENOUS BLD VENIPUNCTURE: CPT

## 2023-07-05 PROCEDURE — 84443 ASSAY THYROID STIM HORMONE: CPT

## 2023-07-05 PROCEDURE — 83036 HEMOGLOBIN GLYCOSYLATED A1C: CPT | Mod: GA

## 2023-07-05 PROCEDURE — 80053 COMPREHEN METABOLIC PANEL: CPT

## 2023-07-05 PROCEDURE — 80061 LIPID PANEL: CPT

## 2023-07-05 PROCEDURE — 85025 COMPLETE CBC W/AUTO DIFF WBC: CPT

## 2023-07-06 ENCOUNTER — ANTICOAGULATION MONITORING (OUTPATIENT)
Dept: VASCULAR LAB | Facility: MEDICAL CENTER | Age: 73
End: 2023-07-06
Payer: MEDICARE

## 2023-07-06 DIAGNOSIS — I48.0 PAROXYSMAL ATRIAL FIBRILLATION (HCC): ICD-10-CM

## 2023-07-06 DIAGNOSIS — Z79.01 LONG TERM (CURRENT) USE OF ANTICOAGULANTS: ICD-10-CM

## 2023-07-06 DIAGNOSIS — I63.9 ISCHEMIC CEREBROVASCULAR ACCIDENT (CVA) (HCC): Chronic | ICD-10-CM

## 2023-07-06 LAB — INR PPP: 2.1 (ref 2–3.5)

## 2023-07-06 RX ORDER — DILTIAZEM HYDROCHLORIDE 120 MG/1
TABLET, FILM COATED ORAL
Qty: 180 TABLET | Refills: 3 | Status: SHIPPED | OUTPATIENT
Start: 2023-07-06

## 2023-07-06 NOTE — PROGRESS NOTES
Anticoagulation Summary  As of 2023      INR goal:  2.0-3.0   TTR:  83.5 % (3 y)   INR used for dosin.10 (2023)   Warfarin maintenance plan:  5 mg (5 mg x 1) every Wed; 10 mg (5 mg x 2) all other days   Weekly warfarin total:  65 mg   Plan last modified:  Renny Christina PharmD (2022)   Next INR check:  2023   Target end date:  Indefinite    Indications    Paroxysmal atrial fibrillation (HCC) [I48.0]  Long term (current) use of anticoagulants [Z79.01] [Z79.01]  Ischemic cerebrovascular accident (CVA) (HCC) [I63.9]                 Anticoagulation Episode Summary       INR check location:  Anticoagulation Clinic    Preferred lab:      Send INR reminders to:      Comments:    Only call if out of range          Anticoagulation Care Providers       Provider Role Specialty Phone number    JO ANN Padilla.  Nurse Practitioner Family [phone]            Refer to Anticoagulation Patient Findings for HPI    INR therapeutic at 2.1.      Per chart review pt prefers no phone call if INR in range.    Pt to continue with current warfarin dosing regimen.     Will follow up in 2 week(s).     Renny Christina PharmD, BCACP

## 2023-07-07 ENCOUNTER — OFFICE VISIT (OUTPATIENT)
Dept: MEDICAL GROUP | Facility: PHYSICIAN GROUP | Age: 73
End: 2023-07-07
Payer: MEDICARE

## 2023-07-07 VITALS
TEMPERATURE: 98.4 F | OXYGEN SATURATION: 93 % | DIASTOLIC BLOOD PRESSURE: 74 MMHG | HEIGHT: 66 IN | HEART RATE: 72 BPM | SYSTOLIC BLOOD PRESSURE: 126 MMHG | BODY MASS INDEX: 29.25 KG/M2 | WEIGHT: 182 LBS

## 2023-07-07 DIAGNOSIS — I10 ESSENTIAL HYPERTENSION: ICD-10-CM

## 2023-07-07 DIAGNOSIS — Z13.820 ENCOUNTER FOR OSTEOPOROSIS SCREENING IN ASYMPTOMATIC POSTMENOPAUSAL PATIENT: ICD-10-CM

## 2023-07-07 DIAGNOSIS — D49.2 SKIN NEOPLASM: ICD-10-CM

## 2023-07-07 DIAGNOSIS — R73.03 PREDIABETES: ICD-10-CM

## 2023-07-07 DIAGNOSIS — E78.2 MIXED HYPERLIPIDEMIA: ICD-10-CM

## 2023-07-07 DIAGNOSIS — Z78.0 ENCOUNTER FOR OSTEOPOROSIS SCREENING IN ASYMPTOMATIC POSTMENOPAUSAL PATIENT: ICD-10-CM

## 2023-07-07 PROCEDURE — 3074F SYST BP LT 130 MM HG: CPT

## 2023-07-07 PROCEDURE — 99214 OFFICE O/P EST MOD 30 MIN: CPT

## 2023-07-07 PROCEDURE — 3078F DIAST BP <80 MM HG: CPT

## 2023-07-07 ASSESSMENT — PATIENT HEALTH QUESTIONNAIRE - PHQ9: CLINICAL INTERPRETATION OF PHQ2 SCORE: 0

## 2023-07-07 ASSESSMENT — FIBROSIS 4 INDEX: FIB4 SCORE: 1.45

## 2023-07-07 NOTE — ASSESSMENT & PLAN NOTE
Chronic, controlled on five BP medications and followed by Vascular.  -Carvedilol 50 mg BID (patient would like to cut back on this dose since she lost weight. However, it has been encouraged by me and her cardiologist to continue as is).  -Chlorthalidone 25 mg every morning  - Clonidine 0.1 mg BID  -Diltiazem 120 mg BID  -Losartan 100 mg daily  BP in clinic is 122/74  Followed by Vascular. Patient due for annual follow-up.  Continue current management and close follow-up

## 2023-07-07 NOTE — PROGRESS NOTES
"Subjective:     CC:   Chief Complaint   Patient presents with    Follow-Up     HISTORY OF THE PRESENT ILLNESS: Tonya is a pleasant 72 y.o. female here today for a general follow-up and lab review.      Problem   Prediabetes   Essential Hypertension   Mixed Hyperlipidemia       Health Maintenance: Completed    ROS:  All systems negative expect as addressed in assessment and plan.     Objective:     Exam:  /74 (BP Location: Left arm, Patient Position: Sitting, BP Cuff Size: Adult)   Pulse 72   Temp 36.9 °C (98.4 °F) (Temporal)   Ht 1.664 m (5' 5.5\")   Wt 82.6 kg (182 lb)   SpO2 93%   BMI 29.83 kg/m²  Body mass index is 29.83 kg/m².    Physical Exam  Vitals reviewed.   Constitutional:       Appearance: Normal appearance.   Cardiovascular:      Rate and Rhythm: Normal rate.   Pulmonary:      Effort: Pulmonary effort is normal.   Musculoskeletal:         General: Normal range of motion.   Skin:     General: Skin is warm and dry.   Neurological:      Mental Status: Mental status is at baseline.   Psychiatric:         Mood and Affect: Mood normal.         Behavior: Behavior normal.         Labs: Results reviewed from 07/05/23    Assessment & Plan:     72 y.o. female with the following -    1. Essential hypertension  Chronic, controlled on five BP medications and followed by Vascular.  BP in clinic is 126/74    -Carvedilol 50 mg BID (patient would like to cut back on this dose since she lost weight. However, it has been encouraged by me and her cardiologist to continue as is).  -Chlorthalidone 25 mg every morning  - Clonidine 0.1 mg BID  -Diltiazem 120 mg BID  -Losartan 100 mg daily    .Continue current management and close follow-up with Vascular.    2. Prediabetes  Chronic, ongoing  A1C 5.7  - continue healthy diet, weight reduction, daily physical activity   - consider metformin up to 850mg BID to slow or offset progression to T2D as per DPP trial   - monitor labs Q6-12mo     3. Mixed " hyperlipidemia  Chronic, trending up.  Patient has not been watching her diet.  Counseled on diet modification.  Continue with simvastatin 10 mg every evening.   Latest Reference Range & Units 07/05/23 08:52   Cholesterol,Tot 100 - 199 mg/dL 167   Triglycerides 0 - 149 mg/dL 73   HDL >=40 mg/dL 48   LDL <100 mg/dL 104 (H)   (H): Data is abnormally high    4. Skin neoplasm  - Referral to Dermatology    5. Encounter for osteoporosis screening in asymptomatic postmenopausal patient  - DS-BONE DENSITY STUDY (DEXA); Future    Patient was educated in proper administration of medication(s) ordered today including safety, possible SE, risks, benefits, rationale and alternatives to therapy.   Supportive care, differential diagnoses, and indications for immediate follow-up discussed with patient.    Pathogenesis of diagnosis discussed including typical length and natural progression.    Instructed to return to clinic or nearest emergency department for any change in condition, further concerns, or worsening of symptoms.  Patient states understanding of the plan of care and discharge instructions.    Return in about 6 months (around 1/7/2024).    I spent a total of 35 minutes with record review, exam, and communication with the patient, communication with other providers, and documentation of this encounter. This does not include time spent on separately billable procedures/tests.    I have placed the above orders and discussed them with an approved delegating provider.  The MA is performing the below orders under the direction of Dr. Browning.    Please note that this dictation was created using voice recognition software. I have worked with consultants from the vendor as well as technical experts from [facility] to optimize the interface. I have made every reasonable attempt to correct obvious errors, but I expect that there are errors of grammar and possibly content that I did not discover before finalizing the  note.

## 2023-07-09 PROBLEM — R73.03 PREDIABETES: Status: ACTIVE | Noted: 2021-05-13

## 2023-07-10 NOTE — ASSESSMENT & PLAN NOTE
Chronic, ongoing  A1C 5.7  - continue healthy diet, weight reduction, daily physical activity   - consider metformin up to 850mg BID to slow or offset progression to T2D as per DPP trial   - monitor labs Q6-12mo

## 2023-07-10 NOTE — ASSESSMENT & PLAN NOTE
Chronic, trending up.  Patient has not been watching her diet.  Counseled on diet modification.  Continue with simvastatin 10 mg every evening.   Latest Reference Range & Units 07/05/23 08:52   Cholesterol,Tot 100 - 199 mg/dL 167   Triglycerides 0 - 149 mg/dL 73   HDL >=40 mg/dL 48   LDL <100 mg/dL 104 (H)   (H): Data is abnormally high

## 2023-07-20 LAB — INR PPP: 2.1 (ref 2–3.5)

## 2023-07-21 ENCOUNTER — ANTICOAGULATION MONITORING (OUTPATIENT)
Dept: VASCULAR LAB | Facility: MEDICAL CENTER | Age: 73
End: 2023-07-21
Payer: MEDICARE

## 2023-07-21 DIAGNOSIS — I48.0 PAROXYSMAL ATRIAL FIBRILLATION (HCC): ICD-10-CM

## 2023-07-21 DIAGNOSIS — I63.9 ISCHEMIC CEREBROVASCULAR ACCIDENT (CVA) (HCC): Chronic | ICD-10-CM

## 2023-07-21 DIAGNOSIS — Z79.01 LONG TERM (CURRENT) USE OF ANTICOAGULANTS: ICD-10-CM

## 2023-07-21 NOTE — PROGRESS NOTES
Anticoagulation Summary  As of 2023      INR goal:  2.0-3.0   TTR:  83.8 % (3 y)   INR used for dosin.10 (2023)   Warfarin maintenance plan:  5 mg (5 mg x 1) every Wed; 10 mg (5 mg x 2) all other days   Weekly warfarin total:  65 mg   Plan last modified:  Renny Christina PharmD (2022)   Next INR check:  2023   Target end date:  Indefinite    Indications    Paroxysmal atrial fibrillation (HCC) [I48.0]  Long term (current) use of anticoagulants [Z79.01] [Z79.01]  Ischemic cerebrovascular accident (CVA) (HCC) [I63.9]                 Anticoagulation Episode Summary       INR check location:  Anticoagulation Clinic    Preferred lab:      Send INR reminders to:      Comments:    Only call if out of range          Anticoagulation Care Providers       Provider Role Specialty Phone number    JO ANN Padilla.  Nurse Practitioner Family [phone]          Anticoagulation Patient Findings    INR therapeutic at 2.1.  Per chart notes, patient requests contact only when out of range.  Pt is to continue with current warfarin dosing regimen.  Follow up in 2 weeks, to reduce risk of adverse events related to this high risk medication,  Warfarin.    Nestor Burch, PharmD, BCACP

## 2023-08-03 LAB — INR PPP: 2.1 (ref 2–3.5)

## 2023-08-04 ENCOUNTER — ANTICOAGULATION MONITORING (OUTPATIENT)
Dept: VASCULAR LAB | Facility: MEDICAL CENTER | Age: 73
End: 2023-08-04
Payer: MEDICARE

## 2023-08-04 DIAGNOSIS — I48.0 PAROXYSMAL ATRIAL FIBRILLATION (HCC): ICD-10-CM

## 2023-08-04 DIAGNOSIS — Z79.01 LONG TERM (CURRENT) USE OF ANTICOAGULANTS: ICD-10-CM

## 2023-08-04 DIAGNOSIS — I63.9 ISCHEMIC CEREBROVASCULAR ACCIDENT (CVA) (HCC): Chronic | ICD-10-CM

## 2023-08-04 NOTE — PROGRESS NOTES
Anticoagulation Summary  As of 2023      INR goal:  2.0-3.0   TTR:  84.0 % (3 y)   INR used for dosin.10 (8/3/2023)   Warfarin maintenance plan:  5 mg (5 mg x 1) every Wed; 10 mg (5 mg x 2) all other days   Weekly warfarin total:  65 mg   Plan last modified:  Renny Christina PharmD (2022)   Next INR check:  2023   Target end date:  Indefinite    Indications    Paroxysmal atrial fibrillation (HCC) [I48.0]  Long term (current) use of anticoagulants [Z79.01] [Z79.01]  Ischemic cerebrovascular accident (CVA) (HCC) [I63.9]                 Anticoagulation Episode Summary       INR check location:  Anticoagulation Clinic    Preferred lab:      Send INR reminders to:      Comments:    Only call if out of range          Anticoagulation Care Providers       Provider Role Specialty Phone number    JO ANN Padilla.  Nurse Practitioner Family [phone]            Refer to Anticoagulation Patient Findings for HPI    INR therapeutic at 2.1.      Per chart review pt prefers no phone call if INR in range.    Pt to continue with current warfarin dosing regimen.     Will follow up in 2 week(s).     Renny Christina PharmD, BCACP

## 2023-08-09 DIAGNOSIS — I10 ESSENTIAL HYPERTENSION: ICD-10-CM

## 2023-08-14 RX ORDER — CHLORTHALIDONE 25 MG/1
25 TABLET ORAL EVERY MORNING
Qty: 90 TABLET | Refills: 3 | Status: SHIPPED | OUTPATIENT
Start: 2023-08-14

## 2023-08-14 RX ORDER — SIMVASTATIN 10 MG
10 TABLET ORAL EVERY EVENING
Qty: 90 TABLET | Refills: 3 | Status: SHIPPED | OUTPATIENT
Start: 2023-08-14 | End: 2023-10-05

## 2023-08-18 ENCOUNTER — ANTICOAGULATION MONITORING (OUTPATIENT)
Dept: MEDICAL GROUP | Facility: PHYSICIAN GROUP | Age: 73
End: 2023-08-18
Payer: MEDICARE

## 2023-08-18 DIAGNOSIS — I48.0 PAROXYSMAL ATRIAL FIBRILLATION (HCC): ICD-10-CM

## 2023-08-18 DIAGNOSIS — Z79.01 LONG TERM (CURRENT) USE OF ANTICOAGULANTS: ICD-10-CM

## 2023-08-18 DIAGNOSIS — I63.9 ISCHEMIC CEREBROVASCULAR ACCIDENT (CVA) (HCC): Chronic | ICD-10-CM

## 2023-08-18 LAB — INR PPP: 2.3 (ref 2–3.5)

## 2023-08-18 NOTE — PROGRESS NOTES
OP Anticoagulation Service Note    Date: 2023    Anticoagulation Summary  As of 2023      INR goal:  2.0-3.0   TTR:  84.2 % (3.1 y)   INR used for dosin.30 (2023)   Warfarin maintenance plan:  5 mg (5 mg x 1) every Wed; 10 mg (5 mg x 2) all other days   Weekly warfarin total:  65 mg   Plan last modified:  Renny Christina PharmD (2022)   Next INR check:  2023   Target end date:  Indefinite    Indications    Paroxysmal atrial fibrillation (HCC) [I48.0]  Long term (current) use of anticoagulants [Z79.01] [Z79.01]  Ischemic cerebrovascular accident (CVA) (HCC) [I63.9]                 Anticoagulation Episode Summary       INR check location:  Anticoagulation Clinic    Preferred lab:      Send INR reminders to:      Comments:    Only call if out of range          Anticoagulation Care Providers       Provider Role Specialty Phone number    MILO Padilla  Nurse Practitioner Family [phone]          Anticoagulation Patient Findings        Patient's preferred phone number:  [phone]        HPI:   The reason for today's call is to prevent morbidity and mortality from a blood clot and/or stroke and to reduce the risk of bleeding while on a anticoagulant.     PCP:  MILO Renee  [address] [place] [phone]    Assessment:     INR  therapeutic.     Lab Results   Component Value Date/Time    BUN 33 (H) 2023 08:52 AM    CREATININE 0.77 2023 08:52 AM     Lab Results   Component Value Date/Time    HEMOGLOBIN 15.3 2023 08:52 AM    HEMATOCRIT 47.7 (H) 2023 08:52 AM    PLATELETCT 232 2023 08:52 AM    ALKPHOSPHAT 55 2023 08:52 AM    ASTSGOT 16 2023 08:52 AM    ALTSGPT 12 2023 08:52 AM          Current Outpatient Medications:     simvastatin, 10 mg, Oral, Q EVENING    chlorthalidone, 25 mg, Oral, QAM    dilTIAZem, TAKE 1 TABLET TWICE DAILY    carvedilol, 50 mg, Oral, BID    warfarin, TAKE 1 TO 2 TABLETS  EVERY DAY AS DIRECTED BY [facility]ATION [facility]    losartan, 100 mg, Oral, QDAY    cloNIDine, 0.1 mg, Oral, BID    Omega-3 Fatty Acids (OMEGA 3 PO), Take  by mouth.    loratadine, 10 mg, Oral, DAILY      Plan:     Continue the same warfarin dose, as noted above.       Follow-up:     As seen above      Additional information discussed with patient:     Asked patient to please call the anticoagulation clinic if they have any signs/symptoms of bleeding and/or thrombosis or any changes to diet or medications.      National recommendations regarding anticoagulation therapy:     The CHEST guidelines recommends frequent INR monitoring at regular intervals (a few days up to a max of 12 weeks) to ensure patients are on the proper dose of warfarin, and patients are not having any complications from therapy.  INRs can dramatically change over a short time period due to diet, medications, and medical conditions.         [facility]  Phone: [phone]  Fax: [phone]  On call: [phone]  General scheduling/information [phone]  For emergencies please dial [phone]  Please do not use [employer] [employer] for urgent matters, call the phone numbers listed above.    This note was created using voice recognition software (Dragon). The accuracy of the dictation is limited by the abilities of the software. I have reviewed the note prior to signing, however some errors in grammar and context are still possible. If you have any questions related to this note please do not hesitate to contact our office.

## 2023-08-23 DIAGNOSIS — I10 ESSENTIAL HYPERTENSION: ICD-10-CM

## 2023-08-23 RX ORDER — LOSARTAN POTASSIUM 100 MG/1
100 TABLET ORAL
Qty: 90 TABLET | Refills: 3 | Status: CANCELLED | OUTPATIENT
Start: 2023-08-23

## 2023-08-28 RX ORDER — LOSARTAN POTASSIUM 100 MG/1
100 TABLET ORAL
Qty: 90 TABLET | Refills: 3 | Status: SHIPPED | OUTPATIENT
Start: 2023-08-28

## 2023-08-30 DIAGNOSIS — I10 ESSENTIAL HYPERTENSION: ICD-10-CM

## 2023-08-30 RX ORDER — CLONIDINE HYDROCHLORIDE 0.1 MG/1
0.1 TABLET ORAL 2 TIMES DAILY
Qty: 180 TABLET | Refills: 3 | Status: SHIPPED | OUTPATIENT
Start: 2023-08-30

## 2023-09-01 ENCOUNTER — ANTICOAGULATION MONITORING (OUTPATIENT)
Dept: VASCULAR LAB | Facility: MEDICAL CENTER | Age: 73
End: 2023-09-01
Payer: MEDICARE

## 2023-09-01 DIAGNOSIS — Z79.01 LONG TERM (CURRENT) USE OF ANTICOAGULANTS: ICD-10-CM

## 2023-09-01 DIAGNOSIS — I63.9 ISCHEMIC CEREBROVASCULAR ACCIDENT (CVA) (HCC): Chronic | ICD-10-CM

## 2023-09-01 DIAGNOSIS — I48.0 PAROXYSMAL ATRIAL FIBRILLATION (HCC): ICD-10-CM

## 2023-09-01 LAB — INR PPP: 2.4 (ref 2–3.5)

## 2023-09-01 NOTE — PROGRESS NOTES
Anticoagulation Summary  As of 2023      INR goal:  2.0-3.0   TTR:  84.4 % (3.1 y)   INR used for dosin.40 (2023)   Warfarin maintenance plan:  5 mg (5 mg x 1) every Wed; 10 mg (5 mg x 2) all other days   Weekly warfarin total:  65 mg   Plan last modified:  Renny Christina PharmD (2022)   Next INR check:  9/15/2023   Target end date:  Indefinite    Indications    Paroxysmal atrial fibrillation (HCC) [I48.0]  Long term (current) use of anticoagulants [Z79.01] [Z79.01]  Ischemic cerebrovascular accident (CVA) (HCC) [I63.9]                 Anticoagulation Episode Summary       INR check location:  Anticoagulation Clinic    Preferred lab:      Send INR reminders to:      Comments:    Only call if out of range          Anticoagulation Care Providers       Provider Role Specialty Phone number    KRISTA Padilla.R.N.  Nurse Practitioner Family [phone]          Anticoagulation Patient Findings    INR therapeutic at 2.4.  Per chart notes, patient requests contact only when out of range.  Pt is to continue with current warfarin dosing regimen.  Follow up in 2 weeks, to reduce risk of adverse events related to this high risk medication,  Warfarin.    Nestor Burch, PharmD, BCACP

## 2023-09-14 LAB — INR PPP: 2.3 (ref 2–3.5)

## 2023-09-15 ENCOUNTER — ANTICOAGULATION MONITORING (OUTPATIENT)
Dept: VASCULAR LAB | Facility: MEDICAL CENTER | Age: 73
End: 2023-09-15
Payer: MEDICARE

## 2023-09-15 DIAGNOSIS — Z79.01 LONG TERM (CURRENT) USE OF ANTICOAGULANTS: ICD-10-CM

## 2023-09-15 DIAGNOSIS — I48.0 PAROXYSMAL ATRIAL FIBRILLATION (HCC): ICD-10-CM

## 2023-09-15 DIAGNOSIS — I63.9 ISCHEMIC CEREBROVASCULAR ACCIDENT (CVA) (HCC): Chronic | ICD-10-CM

## 2023-09-15 NOTE — PROGRESS NOTES
OP   Telephone Anticoagulation Service Note      Anticoagulation Summary  As of 9/15/2023      INR goal:  2.0-3.0   TTR:  84.6 % (3.2 y)   INR used for dosin.30 (2023)   Warfarin maintenance plan:  5 mg (5 mg x 1) every Wed; 10 mg (5 mg x 2) all other days   Weekly warfarin total:  65 mg   Plan last modified:  Renny Christina PharmD (2022)   Next INR check:  2023   Target end date:  Indefinite    Indications    Paroxysmal atrial fibrillation (HCC) [I48.0]  Long term (current) use of anticoagulants [Z79.01] [Z79.01]  Ischemic cerebrovascular accident (CVA) (HCC) [I63.9]                 Anticoagulation Episode Summary       INR check location:  Anticoagulation Clinic    Preferred lab:      Send INR reminders to:      Comments:  HM  Only call if out of range          Anticoagulation Care Providers       Provider Role Specialty Phone number    NATALI PadillaRMIKI.  Nurse Practitioner Family [phone]          Anticoagulation Patient Findings    Patient prefers we call ONLY if her INR is out of range.   INR is therapeutic today at 2.3.  Patient is aware to call the clinic with any changes to diet or medications, with any signs/sx of bleeding or clotting or with any questions or concerns.     Patient instructed to continue with the current warfarin dosing regimen, and asked to follow up again in 2 weeks.     Lei Callahan  PharmD

## 2023-09-28 LAB — INR PPP: 2.2 (ref 2–3.5)

## 2023-09-29 ENCOUNTER — ANTICOAGULATION MONITORING (OUTPATIENT)
Dept: VASCULAR LAB | Facility: MEDICAL CENTER | Age: 73
End: 2023-09-29
Payer: MEDICARE

## 2023-09-29 DIAGNOSIS — I63.9 ISCHEMIC CEREBROVASCULAR ACCIDENT (CVA) (HCC): Chronic | ICD-10-CM

## 2023-09-29 DIAGNOSIS — Z79.01 LONG TERM (CURRENT) USE OF ANTICOAGULANTS: ICD-10-CM

## 2023-09-29 DIAGNOSIS — I48.0 PAROXYSMAL ATRIAL FIBRILLATION (HCC): ICD-10-CM

## 2023-09-29 NOTE — PROGRESS NOTES
Anticoagulation Summary  As of 2023      INR goal:  2.0-3.0   TTR:  84.8 % (3.2 y)   INR used for dosin.20 (2023)   Warfarin maintenance plan:  5 mg (5 mg x 1) every Wed; 10 mg (5 mg x 2) all other days   Weekly warfarin total:  65 mg   Plan last modified:  Renny Christina PharmD (2022)   Next INR check:  10/12/2023   Target end date:  Indefinite    Indications    Paroxysmal atrial fibrillation (HCC) [I48.0]  Long term (current) use of anticoagulants [Z79.01] [Z79.01]  Ischemic cerebrovascular accident (CVA) (HCC) [I63.9]                 Anticoagulation Episode Summary       INR check location:  Anticoagulation Clinic    Preferred lab:      Send INR reminders to:      Comments:    Only call if out of range          Anticoagulation Care Providers       Provider Role Specialty Phone number    KRISTA Padilla.R.N.  Nurse Practitioner Family [phone]            Refer to Anticoagulation Patient Findings for HPI    INR therapeutic at 2.2.      Per chart review pt prefers no phone call if INR in range.    Pt to continue with current warfarin dosing regimen.     Will follow up in 2 week(s).     Renny Christina PharmD, BCACP

## 2023-10-04 ASSESSMENT — ENCOUNTER SYMPTOMS
NAUSEA: 0
PND: 0
ABDOMINAL PAIN: 0
COUGH: 0
NEAR-SYNCOPE: 0
SHORTNESS OF BREATH: 0
NIGHT SWEATS: 0
DIARRHEA: 0
SYNCOPE: 0
VOMITING: 0
IRREGULAR HEARTBEAT: 0
PALPITATIONS: 0
DIZZINESS: 0
WEAKNESS: 0
ORTHOPNEA: 0
FEVER: 0
FOCAL WEAKNESS: 0
WHEEZING: 0
DYSPNEA ON EXERTION: 0

## 2023-10-05 ENCOUNTER — TELEMEDICINE (OUTPATIENT)
Dept: CARDIOLOGY | Facility: MEDICAL CENTER | Age: 73
End: 2023-10-05
Attending: STUDENT IN AN ORGANIZED HEALTH CARE EDUCATION/TRAINING PROGRAM
Payer: MEDICARE

## 2023-10-05 VITALS
SYSTOLIC BLOOD PRESSURE: 138 MMHG | RESPIRATION RATE: 16 BRPM | HEIGHT: 66 IN | OXYGEN SATURATION: 92 % | HEART RATE: 70 BPM | WEIGHT: 185 LBS | BODY MASS INDEX: 29.73 KG/M2 | DIASTOLIC BLOOD PRESSURE: 78 MMHG

## 2023-10-05 DIAGNOSIS — E78.5 DYSLIPIDEMIA: ICD-10-CM

## 2023-10-05 DIAGNOSIS — I10 ESSENTIAL HYPERTENSION: ICD-10-CM

## 2023-10-05 DIAGNOSIS — I48.0 HYPERCOAGULABLE STATE DUE TO PAROXYSMAL ATRIAL FIBRILLATION (HCC): ICD-10-CM

## 2023-10-05 DIAGNOSIS — D68.69 HYPERCOAGULABLE STATE DUE TO PAROXYSMAL ATRIAL FIBRILLATION (HCC): ICD-10-CM

## 2023-10-05 DIAGNOSIS — R94.31 ABNORMAL EKG: ICD-10-CM

## 2023-10-05 DIAGNOSIS — I48.0 PAROXYSMAL ATRIAL FIBRILLATION (HCC): ICD-10-CM

## 2023-10-05 LAB — EKG IMPRESSION: NORMAL

## 2023-10-05 PROCEDURE — 99214 OFFICE O/P EST MOD 30 MIN: CPT | Mod: 95 | Performed by: STUDENT IN AN ORGANIZED HEALTH CARE EDUCATION/TRAINING PROGRAM

## 2023-10-05 PROCEDURE — 99212 OFFICE O/P EST SF 10 MIN: CPT | Performed by: STUDENT IN AN ORGANIZED HEALTH CARE EDUCATION/TRAINING PROGRAM

## 2023-10-05 PROCEDURE — 93010 ELECTROCARDIOGRAM REPORT: CPT | Mod: 95 | Performed by: STUDENT IN AN ORGANIZED HEALTH CARE EDUCATION/TRAINING PROGRAM

## 2023-10-05 PROCEDURE — 93005 ELECTROCARDIOGRAM TRACING: CPT | Performed by: STUDENT IN AN ORGANIZED HEALTH CARE EDUCATION/TRAINING PROGRAM

## 2023-10-05 RX ORDER — ATORVASTATIN CALCIUM 10 MG/1
10 TABLET, FILM COATED ORAL NIGHTLY
Qty: 90 TABLET | Refills: 3 | Status: SHIPPED | OUTPATIENT
Start: 2023-10-05

## 2023-10-05 ASSESSMENT — FIBROSIS 4 INDEX: FIB4 SCORE: 1.45

## 2023-10-05 NOTE — PROGRESS NOTES
Cardiology Follow-up Consultation Note/Virtual Video Visit Note    This evaluation was conducted via Zoom using secure and encrypted videoconferencing technology. The patient was in a private location outside of their home in the [facility].    The patient's identity was confirmed and verbal consent was obtained for this virtual visit.    Date of note:    10/05/23  Primary Care Provider: MILO Padilla     Patient Name: Tonya Del Valle     YOB: 1950  MRN:              [id number]    Chief Complaint: Follow up    History of Present Illness: Ms. Tonya Del Valle is a 72 y.o. female whose current medical problems include afib on anticoagulation, dyslipidemia, HTN, and CVA who is here for follow up afib.     The patient was last seen in my clinic on 03/30/23.  The patient was doing well during the last visit, and no changes were made to her medications.  During the last visit, the patient tells me that she has lost about 35 pounds since the prior visit from watching her diet.    The patient returns today for follow up.  She presents with her .  She is not very active due to prior stroke but does walk with a walker. She denies any chest pain or shortness of breath with exertion. No orthopnea/PND/leg swelling. No palpitations.  No syncope or presyncopal episodes.      Cardiovascular Risk Factors:  1. Smoking status: Never smoker  2. Type II Diabetes Mellitus: diet controlled  Lab Results   Component Value Date/Time    HBA1C 5.7 (H) 07/05/2023 08:52 AM    HBA1C 5.7 (H) 08/17/2021 06:58 AM     3. Hypertension: On medication  4. Dyslipidemia: On statin   Lab Results   Component Value Date/Time    CHOLSTRLTOT 167 07/05/2023 0852    TRIGLYCERIDE 73 07/05/2023 0852    HDL 48 07/05/2023 0852     (H) 07/05/2023 0852         5. Family history of early Coronary Artery Disease in a first degree relative (Male less than 55 years of age; Female less than 65 years of age): Mother with  "possible at age 58  6.  Obesity and/or Metabolic Syndrome: Body mass index is 30.32 kg/m².   7. Sedentary lifestyle: Not sedentary    Review of Systems   Constitutional: Negative for fever, malaise/fatigue and night sweats.   Cardiovascular:  Negative for chest pain, dyspnea on exertion, irregular heartbeat, leg swelling, near-syncope, orthopnea, palpitations, paroxysmal nocturnal dyspnea and syncope.   Respiratory:  Negative for cough, shortness of breath and wheezing.    Gastrointestinal:  Negative for abdominal pain, diarrhea, nausea and vomiting.   Neurological:  Negative for dizziness, focal weakness and weakness.       All other systems reviewed and are negative.         Current Outpatient Medications   Medication Sig Dispense Refill    atorvastatin (LIPITOR) 10 MG Tab Take 1 Tablet by mouth every evening. 90 Tablet 3    cloNIDine (CATAPRES) 0.1 MG Tab TAKE 1 TABLET TWICE DAILY 180 Tablet 3    losartan (COZAAR) 100 MG Tab TAKE 1 TABLET EVERY DAY 90 Tablet 3    chlorthalidone (HYGROTON) 25 MG Tab TAKE 1 TABLET EVERY MORNING 90 Tablet 3    dilTIAZem (CARDIZEM) 120 MG Tab TAKE 1 TABLET TWICE DAILY 180 Tablet 3    carvedilol (COREG) 25 MG Tab TAKE 2 TABLETS BY MOUTH 2 TIMES A DAY. 360 Tablet 3    warfarin (COUMADIN) 5 MG Tab TAKE 1 TO 2 TABLETS EVERY DAY AS DIRECTED BY [facility]TICO[facility] 180 Tablet 1    Omega-3 Fatty Acids (OMEGA 3 PO) Take  by mouth.      loratadine (CLARITIN) 10 MG Tab Take 10 mg by mouth every day.       No current facility-administered medications for this visit.         Allergies   Allergen Reactions    Sulfa Drugs Rash       Physical Exam:  Ambulatory Vitals  /78 (BP Location: Right arm, Patient Position: Sitting, BP Cuff Size: Adult)   Pulse 70   Resp 16   Ht 1.664 m (5' 5.5\")   Wt 83.9 kg (185 lb)   SpO2 92%    Oxygen Therapy:  Pulse Oximetry: 92 %  BP Readings from Last 4 Encounters:   10/05/23 138/78   07/07/23 126/74   03/30/23 (!) 130/94   12/08/22 122/74 "       Weight/BMI: Body mass index is 30.32 kg/m².  Wt Readings from Last 4 Encounters:   10/05/23 83.9 kg (185 lb)   07/07/23 82.6 kg (182 lb)   03/30/23 81.6 kg (180 lb)   12/08/22 83.5 kg (184 lb)       General: Well appearing and in no apparent distress  HEENT: Normal  Neurological: No focal sensory deficits  Psychiatric: Appropriate affect, A/O x 3, intact judgement and insight      Lab Data Review:  Lab Results   Component Value Date/Time    CHOLSTRLTOT 167 07/05/2023 08:52 AM     (H) 07/05/2023 08:52 AM    HDL 48 07/05/2023 08:52 AM    TRIGLYCERIDE 73 07/05/2023 08:52 AM       Lab Results   Component Value Date/Time    SODIUM 140 07/05/2023 08:52 AM    POTASSIUM 3.8 07/05/2023 08:52 AM    CHLORIDE 101 07/05/2023 08:52 AM    CO2 27 07/05/2023 08:52 AM    GLUCOSE 106 (H) 07/05/2023 08:52 AM    BUN 33 (H) 07/05/2023 08:52 AM    CREATININE 0.77 07/05/2023 08:52 AM     Lab Results   Component Value Date/Time    ALKPHOSPHAT 55 07/05/2023 08:52 AM    ASTSGOT 16 07/05/2023 08:52 AM    ALTSGPT 12 07/05/2023 08:52 AM    TBILIRUBIN 0.5 07/05/2023 08:52 AM      Lab Results   Component Value Date/Time    WBC 5.3 07/05/2023 08:52 AM    HEMOGLOBIN 15.3 07/05/2023 08:52 AM     Lab Results   Component Value Date/Time    HBA1C 5.7 (H) 07/05/2023 08:52 AM    HBA1C 5.7 (H) 08/17/2021 06:58 AM         Cardiac Imaging and Procedures Review:    EKG 10/5/2023: My personal interpretation is sinus rhythm, prolonged ME interval, old anterior infarct.     Echocardiogram 7/2019 - normal as per prior cardiologist note      Assessment & Plan     1. Paroxysmal atrial fibrillation (HCC)  EKG    EC-ECHOCARDIOGRAM COMPLETE W/ CONT      2. Dyslipidemia  atorvastatin (LIPITOR) 10 MG Tab      3. Hypercoagulable state due to paroxysmal atrial fibrillation (HCC)        4. Essential hypertension  EC-ECHOCARDIOGRAM COMPLETE W/ CONT      5. Abnormal EKG  EC-ECHOCARDIOGRAM COMPLETE W/ CONT            Shared Medical Decision Making:    Paroxysmal  afib  Hypercoagulable state due to AF  Asymptomatic. Currently in NSR. [id number] 5 (age, female, HTN, CVAx2).    -Continue diltiazem 120mg twice daily and carvedilol 50mg tiwce daily.   -Continue warfarin  -Obtain echocardiogram prior to next visit    HTN  BP within normal limits  -Continue losartan 100mg daily, clonidine 0.2mg daily, chlorthalidone 25 mg daily  -Continue diltiazem and carvedilol as above  -Obtain echocardiogram as above    Dyslipidemia  Lipid elevated on last check  -Stop simvastatin  -Start atorvastatin 10mg daily  -Counseled on heart healthy diet and exercise.     Abnormal EKG  EKG shows old anterior infarct. Patient without any cardiac symptoms  -Obtain echocardiogram to assess LVEF and any structural abnormalities.     All of the patient's excellent questions were answered to the best of my knowledge and to her satisfaction.  It was a pleasure seeing Ms. Tonya Del Valle in my clinic today. Return in about 6 months (around 4/5/2024). Patient is aware to call the cardiology clinic with any questions or concerns.      Isabella Mcleod MD  [facility]  [place] [facility], [address]  [address] [address]  Phone: [phone]  Fax: [phone]

## 2023-10-09 ENCOUNTER — DOCUMENTATION (OUTPATIENT)
Dept: HEALTH INFORMATION MANAGEMENT | Facility: OTHER | Age: 73
End: 2023-10-09
Payer: MEDICARE

## 2023-10-12 LAB — INR PPP: 2 (ref 2–3.5)

## 2023-10-13 ENCOUNTER — ANTICOAGULATION MONITORING (OUTPATIENT)
Dept: VASCULAR LAB | Facility: MEDICAL CENTER | Age: 73
End: 2023-10-13
Payer: MEDICARE

## 2023-10-13 DIAGNOSIS — Z79.01 LONG TERM (CURRENT) USE OF ANTICOAGULANTS: ICD-10-CM

## 2023-10-13 DIAGNOSIS — I48.0 PAROXYSMAL ATRIAL FIBRILLATION (HCC): ICD-10-CM

## 2023-10-13 DIAGNOSIS — I63.9 ISCHEMIC CEREBROVASCULAR ACCIDENT (CVA) (HCC): Chronic | ICD-10-CM

## 2023-10-13 NOTE — PROGRESS NOTES
Anticoagulation Summary  As of 10/13/2023      INR goal:  2.0-3.0   TTR:  84.9 % (3.2 y)   INR used for dosin.00 (10/12/2023)   Warfarin maintenance plan:  5 mg (5 mg x 1) every Wed; 10 mg (5 mg x 2) all other days   Weekly warfarin total:  65 mg   Plan last modified:  Renny Christina PharmD (2022)   Next INR check:  10/27/2023   Target end date:  Indefinite    Indications    Paroxysmal atrial fibrillation (HCC) [I48.0]  Long term (current) use of anticoagulants [Z79.01] [Z79.01]  Ischemic cerebrovascular accident (CVA) (HCC) [I63.9]                 Anticoagulation Episode Summary       INR check location:  Anticoagulation Clinic    Preferred lab:      Send INR reminders to:      Comments:    Only call if out of range          Anticoagulation Care Providers       Provider Role Specialty Phone number    KRISTA Padilla.R.N.  Nurse Practitioner Family [phone]            Per chart review, pt does not want to be contacted if INR is therapeutic     INR therapeutic    Unless patient reports any changes that would warrant an adjustment to the plan:  Warfarin Plan: Continue regimen as listed above.    Next INR in 2 week(s).    Judi Davidson, IdrisD, BCACP

## 2023-10-26 ENCOUNTER — ANTICOAGULATION MONITORING (OUTPATIENT)
Dept: VASCULAR LAB | Facility: MEDICAL CENTER | Age: 73
End: 2023-10-26
Payer: MEDICARE

## 2023-10-26 DIAGNOSIS — Z79.01 LONG TERM (CURRENT) USE OF ANTICOAGULANTS: ICD-10-CM

## 2023-10-26 DIAGNOSIS — I48.0 PAROXYSMAL ATRIAL FIBRILLATION (HCC): ICD-10-CM

## 2023-10-26 DIAGNOSIS — I63.9 ISCHEMIC CEREBROVASCULAR ACCIDENT (CVA) (HCC): Chronic | ICD-10-CM

## 2023-10-26 LAB — INR PPP: 2.6 (ref 2–3.5)

## 2023-10-26 NOTE — PROGRESS NOTES
Anticoagulation Summary  As of 10/26/2023      INR goal:  2.0-3.0   TTR:  85.1 % (3.3 y)   INR used for dosin.60 (10/26/2023)   Warfarin maintenance plan:  5 mg (5 mg x 1) every Wed; 10 mg (5 mg x 2) all other days   Weekly warfarin total:  65 mg   Plan last modified:  Renny Christina PharmD (2022)   Next INR check:  2023   Target end date:  Indefinite    Indications    Paroxysmal atrial fibrillation (HCC) [I48.0]  Long term (current) use of anticoagulants [Z79.01] [Z79.01]  Ischemic cerebrovascular accident (CVA) (HCC) [I63.9]                 Anticoagulation Episode Summary       INR check location:  Anticoagulation Clinic    Preferred lab:      Send INR reminders to:      Comments:    Only call if out of range          Anticoagulation Care Providers       Provider Role Specialty Phone number    KRISTA Padilla.RKimmyN.  Nurse Practitioner Family [phone]          Anticoagulation Patient Findings      Pt reported a therapeutic INR  Per chart review, pt does not want to be contacted if INR is therapeutic   Pt to continue with current warfarin dosing regimen. Requested pt contact the clinic for any s/s of unusual bleeding, bruising, clotting or any changes to diet or medication.    FU INR in 2 week(s).    Jolanta Barnett, IdrisD

## 2023-10-27 ENCOUNTER — HOSPITAL ENCOUNTER (OUTPATIENT)
Dept: CARDIOLOGY | Facility: MEDICAL CENTER | Age: 73
End: 2023-10-27
Attending: STUDENT IN AN ORGANIZED HEALTH CARE EDUCATION/TRAINING PROGRAM
Payer: MEDICARE

## 2023-10-27 DIAGNOSIS — I10 ESSENTIAL HYPERTENSION: ICD-10-CM

## 2023-10-27 DIAGNOSIS — I48.0 PAROXYSMAL ATRIAL FIBRILLATION (HCC): ICD-10-CM

## 2023-10-27 DIAGNOSIS — R94.31 ABNORMAL EKG: ICD-10-CM

## 2023-10-27 PROCEDURE — 93306 TTE W/DOPPLER COMPLETE: CPT

## 2023-10-27 PROCEDURE — 700117 HCHG RX CONTRAST REV CODE 255: Performed by: STUDENT IN AN ORGANIZED HEALTH CARE EDUCATION/TRAINING PROGRAM

## 2023-10-27 RX ADMIN — HUMAN ALBUMIN MICROSPHERES AND PERFLUTREN 3 ML: 10; .22 INJECTION, SOLUTION INTRAVENOUS at 11:45

## 2023-10-30 LAB
LV EJECT FRACT MOD 2C 99903: 68.87
LV EJECT FRACT MOD 4C 99902: 55.45
LV EJECT FRACT MOD BP 99901: 61.86

## 2023-10-30 PROCEDURE — 93306 TTE W/DOPPLER COMPLETE: CPT | Mod: 26 | Performed by: STUDENT IN AN ORGANIZED HEALTH CARE EDUCATION/TRAINING PROGRAM

## 2023-11-09 LAB — INR PPP: 2.4 (ref 2–3.5)

## 2023-11-10 ENCOUNTER — ANTICOAGULATION MONITORING (OUTPATIENT)
Dept: VASCULAR LAB | Facility: MEDICAL CENTER | Age: 73
End: 2023-11-10
Payer: MEDICARE

## 2023-11-10 DIAGNOSIS — I48.0 PAROXYSMAL ATRIAL FIBRILLATION (HCC): ICD-10-CM

## 2023-11-10 DIAGNOSIS — Z79.01 LONG TERM (CURRENT) USE OF ANTICOAGULANTS: ICD-10-CM

## 2023-11-10 DIAGNOSIS — I63.9 ISCHEMIC CEREBROVASCULAR ACCIDENT (CVA) (HCC): Chronic | ICD-10-CM

## 2023-11-10 NOTE — PROGRESS NOTES
Anticoagulation Summary  As of 11/10/2023      INR goal:  2.0-3.0   TTR:  85.3 % (3.3 y)   INR used for dosin.40 (2023)   Warfarin maintenance plan:  5 mg (5 mg x 1) every Wed; 10 mg (5 mg x 2) all other days   Weekly warfarin total:  65 mg   Plan last modified:  Renny Christina PharmD (2022)   Next INR check:  2023   Target end date:  Indefinite    Indications    Paroxysmal atrial fibrillation (HCC) [I48.0]  Long term (current) use of anticoagulants [Z79.01] [Z79.01]  Ischemic cerebrovascular accident (CVA) (HCC) [I63.9]                 Anticoagulation Episode Summary       INR check location:  Anticoagulation Clinic    Preferred lab:      Send INR reminders to:      Comments:    Only call if out of range          Anticoagulation Care Providers       Provider Role Specialty Phone number    KRSITA Padilla.R.N.  Nurse Practitioner Family [phone]            Refer to Anticoagulation Patient Findings for HPI    INR therapeutic at 2.4.      Per chart review pt prefers no phone call if INR in range.    Pt to continue with current warfarin dosing regimen.     Will follow up in 2 week(s).     Renny Christina PharmD, BCACP     English

## 2023-11-25 LAB — INR PPP: 2.4 (ref 2–3.5)

## 2023-11-27 ENCOUNTER — ANTICOAGULATION MONITORING (OUTPATIENT)
Dept: VASCULAR LAB | Facility: MEDICAL CENTER | Age: 73
End: 2023-11-27
Payer: MEDICARE

## 2023-11-27 DIAGNOSIS — I63.9 ISCHEMIC CEREBROVASCULAR ACCIDENT (CVA) (HCC): Chronic | ICD-10-CM

## 2023-11-27 DIAGNOSIS — I48.0 PAROXYSMAL ATRIAL FIBRILLATION (HCC): ICD-10-CM

## 2023-11-27 DIAGNOSIS — Z79.01 LONG TERM (CURRENT) USE OF ANTICOAGULANTS: ICD-10-CM

## 2023-11-27 NOTE — PROGRESS NOTES
Anticoagulation Summary  As of 2023      INR goal:  2.0-3.0   TTR:  85.5 % (3.4 y)   INR used for dosin.40 (2023)   Warfarin maintenance plan:  5 mg (5 mg x 1) every Wed; 10 mg (5 mg x 2) all other days   Weekly warfarin total:  65 mg   Plan last modified:  Renny Christina PharmD (2022)   Next INR check:  2023   Target end date:  Indefinite    Indications    Paroxysmal atrial fibrillation (HCC) [I48.0]  Long term (current) use of anticoagulants [Z79.01] [Z79.01]  Ischemic cerebrovascular accident (CVA) (HCC) [I63.9]                 Anticoagulation Episode Summary       INR check location:  Anticoagulation Clinic    Preferred lab:      Send INR reminders to:      Comments:    Only call if out of range          Anticoagulation Care Providers       Provider Role Specialty Phone number    KRISTA Padilla.R.N.  Nurse Practitioner Family [phone]            Refer to Anticoagulation Patient Findings for HPI    INR therapeutic at 2.4.      Per chart review pt prefers no phone call if INR in range.    Pt to continue with current warfarin dosing regimen.     Will follow up in 2 week(s).     Renny Christina PharmD, BCACP

## 2023-11-29 ENCOUNTER — PATIENT MESSAGE (OUTPATIENT)
Dept: HEALTH INFORMATION MANAGEMENT | Facility: OTHER | Age: 73
End: 2023-11-29

## 2023-11-30 ENCOUNTER — OFFICE VISIT (OUTPATIENT)
Dept: MEDICAL GROUP | Facility: PHYSICIAN GROUP | Age: 73
End: 2023-11-30
Payer: MEDICARE

## 2023-11-30 VITALS
OXYGEN SATURATION: 91 % | RESPIRATION RATE: 14 BRPM | BODY MASS INDEX: 29.25 KG/M2 | HEIGHT: 66 IN | SYSTOLIC BLOOD PRESSURE: 132 MMHG | DIASTOLIC BLOOD PRESSURE: 82 MMHG | TEMPERATURE: 97.7 F | HEART RATE: 89 BPM | WEIGHT: 182 LBS

## 2023-11-30 DIAGNOSIS — Z12.31 ENCOUNTER FOR SCREENING MAMMOGRAM FOR BREAST CANCER: ICD-10-CM

## 2023-11-30 DIAGNOSIS — E78.2 MIXED HYPERLIPIDEMIA: ICD-10-CM

## 2023-11-30 DIAGNOSIS — I48.0 PAROXYSMAL ATRIAL FIBRILLATION (HCC): ICD-10-CM

## 2023-11-30 DIAGNOSIS — I48.0 HYPERCOAGULABLE STATE DUE TO PAROXYSMAL ATRIAL FIBRILLATION (HCC): ICD-10-CM

## 2023-11-30 DIAGNOSIS — D68.69 HYPERCOAGULABLE STATE DUE TO PAROXYSMAL ATRIAL FIBRILLATION (HCC): ICD-10-CM

## 2023-11-30 DIAGNOSIS — Z86.73 HISTORY OF ISCHEMIC STROKE: ICD-10-CM

## 2023-11-30 DIAGNOSIS — R26.89 BALANCE PROBLEM: ICD-10-CM

## 2023-11-30 DIAGNOSIS — R73.03 PREDIABETES: ICD-10-CM

## 2023-11-30 DIAGNOSIS — Z12.11 COLON CANCER SCREENING: ICD-10-CM

## 2023-11-30 DIAGNOSIS — I10 ESSENTIAL HYPERTENSION: ICD-10-CM

## 2023-11-30 PROCEDURE — 99214 OFFICE O/P EST MOD 30 MIN: CPT | Performed by: FAMILY MEDICINE

## 2023-11-30 PROCEDURE — 3075F SYST BP GE 130 - 139MM HG: CPT | Performed by: FAMILY MEDICINE

## 2023-11-30 PROCEDURE — 3079F DIAST BP 80-89 MM HG: CPT | Performed by: FAMILY MEDICINE

## 2023-11-30 ASSESSMENT — FIBROSIS 4 INDEX: FIB4 SCORE: 1.47

## 2023-11-30 NOTE — PROGRESS NOTES
"CHIEF COMPLAINT / REASON FOR VISIT  Tonya Del Valle is a 73 y.o. female that presents today to establish care.    HISTORY OF PRESENT ILLNESS  Since stroke (8/24/2009) has had difficulty with balance, 4 falls, now using walker for assistance with ambulation. Secondary to atrial fibrillation.    Past Surgical History:   Procedure Laterality Date    OTHER      artery repair in 1970 related to childbirth     Social History     Tobacco Use    Smoking status: Never    Smokeless tobacco: Never   Vaping Use    Vaping Use: Never used   Substance Use Topics    Alcohol use: Not Currently     Comment: rare wine    Drug use: Not Currently     OBJECTIVE    /82 (BP Location: Left arm, Patient Position: Sitting, BP Cuff Size: Large adult)   Pulse 89   Temp 36.5 °C (97.7 °F) (Temporal)   Resp 14   Ht 1.664 m (5' 5.5\")   Wt 82.6 kg (182 lb)   SpO2 91%   BMI 29.83 kg/m²      PHYSICAL EXAM  Constitutional: Sitting comfortably, in no acute distress, responds to questions appropriately.  Head: Normocephalic  Eyes:  No conjunctival injection, no scleral icterus, PERRL  Mouth: Oral mucosa moist  Throat: Oropharynx clear without erythema or tonsillar exudates  Neck: No cervical lymphadenopathy  Heart: Regular S1 S2, no murmurs, rub, or gallops  Lungs: Clear to auscultation bilaterally, no wheezes, rales, or rhonchi  Extremities: No lower extremity edema. 2+ symmetric radial pulses  Skin: Warm and dry, no rashes or lesions on face or exposed upper extremities    ASSESSMENT & PLAN  1. Balance problem  Since stroke in 2009 has had residual right lower extremity weakness and difficulties with balance, recommend physical therapy.  Currently using 2 wheeled walker for assistance with mobility  - Referral to Physical Therapy    2. Paroxysmal atrial fibrillation (HCC)  Chronic, follows with cardiology for management, anticoagulated with warfarin, rate control with diltiazem 120 mg and carvedilol 50 mg twice daily  - Comp " Metabolic Panel; Future  - CBC WITH DIFFERENTIAL; Future    3. Hypercoagulable state due to paroxysmal atrial fibrillation (HCC)  Anticoagulated with warfarin    4. History of ischemic stroke  Cardioembolic ischemic stroke 8/24/2009, has residual right lower extremity weakness and difficulties with balance  - Referral to Physical Therapy    5. Essential hypertension  Chronic, controlled with diltiazem 120 mg, losartan 100 mg daily, carvedilol 50 mg twice daily  - Comp Metabolic Panel; Future  - CBC WITH DIFFERENTIAL; Future    6. Prediabetes  - HEMOGLOBIN A1C; Future    7. Mixed hyperlipidemia  Chronic, controlled with atorvastatin 10 mg daily.  Continue current therapy.  - Lipid Profile; Future    8. Encounter for screening mammogram for breast cancer  - MA-SCREENING MAMMO BILAT W/TOMOSYNTHESIS W/CAD; Future    9. Colon cancer screening  - DONYA (FIT DNA)    Follow-up in 6 months after labs

## 2023-12-08 ENCOUNTER — ANTICOAGULATION MONITORING (OUTPATIENT)
Dept: VASCULAR LAB | Facility: MEDICAL CENTER | Age: 73
End: 2023-12-08
Payer: MEDICARE

## 2023-12-08 DIAGNOSIS — Z86.73 HISTORY OF ISCHEMIC STROKE: ICD-10-CM

## 2023-12-08 DIAGNOSIS — I48.0 PAROXYSMAL ATRIAL FIBRILLATION (HCC): ICD-10-CM

## 2023-12-08 DIAGNOSIS — Z79.01 LONG TERM (CURRENT) USE OF ANTICOAGULANTS: ICD-10-CM

## 2023-12-08 LAB — INR PPP: 2.8 (ref 2–3.5)

## 2023-12-08 NOTE — PROGRESS NOTES
OP   Telephone Anticoagulation Service Note      Anticoagulation Summary  As of 2023      INR goal:  2.0-3.0   TTR:  85.6 % (3.4 y)   INR used for dosin.80 (2023)   Warfarin maintenance plan:  5 mg (5 mg x 1) every Wed; 10 mg (5 mg x 2) all other days   Weekly warfarin total:  65 mg   Plan last modified:  Renny Christina PharmD (2022)   Next INR check:  2023   Target end date:  Indefinite    Indications    Paroxysmal atrial fibrillation (HCC) [I48.0]  Long term (current) use of anticoagulants [Z79.01] [Z79.01]  History of ischemic stroke [Z86.73]                 Anticoagulation Episode Summary       INR check location:  Anticoagulation Clinic    Preferred lab:      Send INR reminders to:      Comments:  HM  Only call if out of range          Anticoagulation Care Providers       Provider Role Specialty Phone number    JO ANN Padilla.  Nurse Practitioner Family [phone]          Anticoagulation Patient Findings  Patient Findings       Negatives:  Signs/symptoms of thrombosis, Signs/symptoms of bleeding, Laboratory test error suspected, Change in health, Change in alcohol use, Change in activity, Upcoming invasive procedure, Emergency department visit, Upcoming dental procedure, Missed doses, Extra doses, Change in medications, Change in diet/appetite, Hospital admission, Bruising, Other complaints          Called and spoke with the patient's , Allen, on the phone today, as I did not see note that patient prefers a call ONLY if her INR is out of range.   Confirmed the current warfarin dosing regimen and patient compliance.  Patient denies any interval changes to diet and/or medications. Patient denies any signs/symptoms of bleeding or clotting.  Patient instructed to continue with the current warfarin dosing regimen, and asked to follow up again in 2 weeks.     Lei SteinbergD

## 2023-12-23 LAB — INR PPP: 2.5 (ref 2–3.5)

## 2023-12-26 ENCOUNTER — ANTICOAGULATION MONITORING (OUTPATIENT)
Dept: VASCULAR LAB | Facility: MEDICAL CENTER | Age: 73
End: 2023-12-26
Payer: MEDICARE

## 2023-12-26 DIAGNOSIS — I48.0 PAROXYSMAL ATRIAL FIBRILLATION (HCC): ICD-10-CM

## 2023-12-26 DIAGNOSIS — Z79.01 LONG TERM (CURRENT) USE OF ANTICOAGULANTS: ICD-10-CM

## 2023-12-26 DIAGNOSIS — Z86.73 HISTORY OF ISCHEMIC STROKE: ICD-10-CM

## 2023-12-26 NOTE — PROGRESS NOTES
Anticoagulation Summary  As of 2023      INR goal:  2.0-3.0   TTR:  85.8 % (3.4 y)   INR used for dosin.50 (2023)   Warfarin maintenance plan:  5 mg (5 mg x 1) every Wed; 10 mg (5 mg x 2) all other days   Weekly warfarin total:  65 mg   Plan last modified:  Renny Christina PharmD (2022)   Next INR check:  2024   Target end date:  Indefinite    Indications    Paroxysmal atrial fibrillation (HCC) [I48.0]  Long term (current) use of anticoagulants [Z79.01] [Z79.01]  History of ischemic stroke [Z86.73]                 Anticoagulation Episode Summary       INR check location:  Anticoagulation Clinic    Preferred lab:      Send INR reminders to:      Comments:    Only call if out of range          Anticoagulation Care Providers       Provider Role Specialty Phone number    MILO Padilla  Nurse Practitioner Family [phone]          Per chart review, pt does not want to be contacted if INR is therapeutic     INR therapeutic    Unless patient reports any changes that would warrant an adjustment to the plan:  Warfarin Plan: Continue regimen as listed above.    Next INR in 2 week(s).    Judi Davidson, IdrisD, BCACP

## 2024-01-06 LAB — INR PPP: 2.3 (ref 2–3.5)

## 2024-01-08 ENCOUNTER — ANTICOAGULATION MONITORING (OUTPATIENT)
Dept: VASCULAR LAB | Facility: MEDICAL CENTER | Age: 74
End: 2024-01-08
Payer: MEDICARE

## 2024-01-08 DIAGNOSIS — Z86.73 HISTORY OF ISCHEMIC STROKE: ICD-10-CM

## 2024-01-08 DIAGNOSIS — I48.0 PAROXYSMAL ATRIAL FIBRILLATION (HCC): ICD-10-CM

## 2024-01-08 DIAGNOSIS — Z79.01 LONG TERM (CURRENT) USE OF ANTICOAGULANTS: ICD-10-CM

## 2024-01-20 LAB — INR PPP: 3.2 (ref 2–3.5)

## 2024-01-22 ENCOUNTER — ANTICOAGULATION MONITORING (OUTPATIENT)
Dept: MEDICAL GROUP | Facility: PHYSICIAN GROUP | Age: 74
End: 2024-01-22
Payer: MEDICARE

## 2024-01-22 DIAGNOSIS — I48.0 PAROXYSMAL ATRIAL FIBRILLATION (HCC): ICD-10-CM

## 2024-01-22 DIAGNOSIS — Z79.01 LONG TERM (CURRENT) USE OF ANTICOAGULANTS: ICD-10-CM

## 2024-01-22 DIAGNOSIS — Z86.73 HISTORY OF ISCHEMIC STROKE: ICD-10-CM

## 2024-01-22 NOTE — PROGRESS NOTES
Anticoagulation Summary  As of 1/22/2024      INR goal:  2.0-3.0   TTR:  85.9 % (3.5 y)   INR used for dosing:  3.20 (1/20/2024)   Warfarin maintenance plan:  5 mg (5 mg x 1) every Wed; 10 mg (5 mg x 2) all other days   Weekly warfarin total:  65 mg   Plan last modified:  Renny Christina PharmD (6/23/2022)   Next INR check:  2/2/2024   Target end date:  Indefinite    Indications    Paroxysmal atrial fibrillation (HCC) [I48.0]  Long term (current) use of anticoagulants [Z79.01] [Z79.01]  History of ischemic stroke [Z86.73]                 Anticoagulation Episode Summary       INR check location:  Anticoagulation Clinic    Preferred lab:      Send INR reminders to:      Comments:    Only call if out of range          Anticoagulation Care Providers       Provider Role Specialty Phone number    MILO Padilla  Nurse Practitioner Family [phone]          Anticoagulation Patient Findings    Spoke with patient today regarding supratherapeutic INR of 3.2.  Patient denies any signs/symptoms of bruising or bleeding or any changes in diet and medications.  Instructed patient to call clinic with any questions or concerns.    Pt is not on antiplatelet therapy    Pt had some spinach salad over the weekend following this test, is to continue with current warfarin dosing regimen.  Follow up in 2 weeks, to reduce risk of adverse events related to this high risk medication,  Warfarin.    Nestor Burch, IdrisD, BCACP

## 2024-01-29 DIAGNOSIS — Z79.01 CHRONIC ANTICOAGULATION: ICD-10-CM

## 2024-01-29 RX ORDER — WARFARIN SODIUM 5 MG/1
TABLET ORAL
Qty: 180 TABLET | Refills: 3 | Status: SHIPPED | OUTPATIENT
Start: 2024-01-29

## 2024-01-29 NOTE — TELEPHONE ENCOUNTER
Received request via: Pharmacy    Was the patient seen in the last year in this department? Yes    Does the patient have an active prescription (recently filled or refills available) for medication(s) requested? No    [facility]    Does the patient have halfway Plus and need 100 day supply (blood pressure, diabetes and cholesterol meds only)? Patient does not have SCP

## 2024-02-03 LAB — INR PPP: 3.1 (ref 2–3.5)

## 2024-02-05 ENCOUNTER — ANTICOAGULATION MONITORING (OUTPATIENT)
Dept: VASCULAR LAB | Facility: MEDICAL CENTER | Age: 74
End: 2024-02-05
Payer: MEDICARE

## 2024-02-05 DIAGNOSIS — Z86.73 HISTORY OF ISCHEMIC STROKE: ICD-10-CM

## 2024-02-05 DIAGNOSIS — Z79.01 LONG TERM (CURRENT) USE OF ANTICOAGULANTS: ICD-10-CM

## 2024-02-05 DIAGNOSIS — I48.0 PAROXYSMAL ATRIAL FIBRILLATION (HCC): ICD-10-CM

## 2024-02-05 NOTE — PROGRESS NOTES
Anticoagulation Summary  As of 2/5/2024      INR goal:  2.0-3.0   TTR:  85.0 % (3.5 y)   INR used for dosing:  3.10 (2/3/2024)   Warfarin maintenance plan:  5 mg (5 mg x 1) every Wed, Sat; 10 mg (5 mg x 2) all other days   Weekly warfarin total:  60 mg   Plan last modified:  Renny Christina PharmD (2/5/2024)   Next INR check:  2/16/2024   Target end date:  Indefinite    Indications    Paroxysmal atrial fibrillation (HCC) [I48.0]  Long term (current) use of anticoagulants [Z79.01] [Z79.01]  History of ischemic stroke [Z86.73]                 Anticoagulation Episode Summary       INR check location:  Anticoagulation Clinic    Preferred lab:      Send INR reminders to:      Comments:    Only call if out of range          Anticoagulation Care Providers       Provider Role Specialty Phone number    MILO Padilla  Nurse Practitioner Family [phone]            Refer to Anticoagulation Patient Findings for HPI  Patient Findings       Positives:  Change in diet/appetite (Increasing vitamin K)    Negatives:  Signs/symptoms of thrombosis, Signs/symptoms of bleeding, Laboratory test error suspected, Change in health, Change in alcohol use, Change in activity, Upcoming invasive procedure, Emergency department visit, Upcoming dental procedure, Missed doses, Extra doses, Change in medications, Hospital admission, Bruising, Other complaints            Spoke with pt .  INR is SUPRA therapeutic.     Pt verifies warfarin weekly dosing.     Will have pt begin newly decreased regimen of 5 mg Wed/Sat and 10 mg ROW.    Repeat INR in 2 week(s).     Renny Christina, PharmD, BCACP

## 2024-02-15 ENCOUNTER — PHYSICAL THERAPY (OUTPATIENT)
Dept: PHYSICAL THERAPY | Facility: REHABILITATION | Age: 74
End: 2024-02-15
Attending: FAMILY MEDICINE
Payer: MEDICARE

## 2024-02-15 DIAGNOSIS — Z86.73 HISTORY OF ISCHEMIC STROKE: ICD-10-CM

## 2024-02-15 DIAGNOSIS — R26.89 BALANCE PROBLEM: ICD-10-CM

## 2024-02-15 PROCEDURE — 97110 THERAPEUTIC EXERCISES: CPT

## 2024-02-15 PROCEDURE — 97162 PT EVAL MOD COMPLEX 30 MIN: CPT

## 2024-02-15 SDOH — ECONOMIC STABILITY: GENERAL: QUALITY OF LIFE: GOOD

## 2024-02-15 ASSESSMENT — ACTIVITIES OF DAILY LIVING (ADL): POOR_BALANCE: 1

## 2024-02-15 NOTE — OP THERAPY EVALUATION
"  Outpatient Physical Therapy  INITIAL NEUROLOGICAL EVALUATION    [facility] [address].  [address]  [place] [address]  Phone:  [phone]  Fax:  [phone]    Date of Evaluation: 02/15/2024    Patient: Tonya Del Valle  YOB: 1950  MRN: [id number]     Referring Provider: Tucker Browning M.D.  [address] [facility] [address]  [place] [address]   Referring Diagnosis Balance problem [R26.89];History of ischemic stroke [Z86.73]     Time Calculation    Start time: 1130  Stop time: 1212 Time Calculation (min): 42 minutes             Chief Complaint: Weakness, Loss Of Balance, and Difficulty Walking    Visit Diagnoses     ICD-10-CM   1. Balance problem  R26.89   2. History of ischemic stroke  Z86.73       Subjective:   History of Present Illness:     Mechanism of injury:  Goes by [place]  Pt had CVA x 14 years and has made a good overall recovery. She feels her Rt LE did not make quite the recovery. Has to lift her Rt LE into bed with UE.    Pt used to have AFO but did not find helpful so she tends to always drag her right foot. If she focuses it can improve a little.     Has tried PT a few times in the 14 years since CVA but has not had much improvement.    Her goal is to improve her balance, feels off balance with head turns,   Would like to walk with a cane, currently uses 4WW. Would like to be able to carry a glass of water with a cane short distances. Finds herself not going out of the house due to inconvenience of 4WW    Fearful of falling. No falls in the last year. But finds herself being very careful.     11/30 ref provider  \"Since stroke (8/24/2009) has had difficulty with balance, 4 falls, now using walker for assistance with ambulation.\"  Quality of life:  Good      Past Medical History:   Diagnosis Date    Arrhythmia     Blood transfusion without reported diagnosis     Clotting disorder (HCC)     History of echocardiogram - 7/2019 normal     " Hyperlipidemia     Hypertension     Ischemic cerebrovascular accident (CVA) ([facility]) 5/5/2017    Obesity (BMI 30-39.9)      Past Surgical History:   Procedure Laterality Date    OTHER      artery repair in 1970 related to childbirth     Social History     Tobacco Use    Smoking status: Never    Smokeless tobacco: Never   Substance Use Topics    Alcohol use: Not Currently     Comment: rare wine     Family and Occupational History     Socioeconomic History    Marital status:      Spouse name: Not on file    Number of children: Not on file    Years of education: Not on file    Highest education level: 12th grade   Occupational History    Not on file       Objective:     Strength:   Lower extremity (left):     Hip flexion: 4-    Hip abduction: 4-    Knee flexion: 4+    Knee extension: 5    Ankle dorsiflexion: 5  Lower extremity (right):     Hip flexion: 2+    Hip abduction: 4-    Knee flexion: 4-    Knee extension: 5    Ankle dorsiflexion: 2+    Observational Gait     Additional Observational Gait Details  Rt LE drags constantly, slow, recip gait    Balance/Gait Comments   5STS BUE: 20.56  TUG 4WW 22.34    10MWT 4WW 18.82  Gait speed .53m/s  6MWT 507 4WW no rests 4/10 fatigue     MURPHY next visit due to time constraints          Therapeutic Exercises (CPT 50613):     1. STS from chair BUE stand, no UE sit, x 10    2. seated ankle DF B LE, 10 x 10 sec      Therapeutic Exercise Summary: HEP: STS up BUE down no UE 3 x10, ankle DF 3 x 10, x 10 sec iso      Time-based treatments/modalities:    Physical Therapy Timed Treatment Charges  Therapeutic exercise minutes (CPT 52904): 10 minutes      Assessment, Response and Plan:   Impairments: abnormal gait, activity intolerance, impaired functional mobility, impaired balance, impaired physical strength and lacks appropriate home exercise program    Assessment details:  Eulalia presents for outpatient PT evaluation with chief complaints of Rt LE weakness, balance, endurance, and  gait impairments secondary to chronic CVA. Pt presents with mod deficits as noted with functional assessments such as TUG and 5STS. As well as moderate 6MWT deficits ambulating 507 feet with 4WW. Due to time constraints balance assessment to be completed next visit. She will benefit from skilled PT 1-2 x 10 weeks to improve above deficits and progress to LRAD to maximize pt function.   Barriers to therapy:  Comorbidities  Other barriers to therapy:  Chronic CVA  Prognosis: good    Goals:   Short Term Goals:   Pt will be compliant with HEP 3-5x per week for improving strength and stabilty.   Pt will complete gait trial with SPC.  Pt will complete most appropriate balance outcome measure.      Short term goal time span:  4-6 weeks      Long Term Goals:    1. Pt will demonstrate improved functional mobility with TUG score 15 sec or better.  2. Pt will demonstrate improved functional LE strength with 5STS 13 sec or better.  3. Pt will demonstrate improved CV endurance and gait speed with 6MWT score 650 feet or better.  4. Pt will demonstrate improved balance scoring low fall risk on most appropriate outcome measure.   5. Pt will demonstrate improved gait speed with 10MWT with 4WW of .8m/s or better to dec fall risk.    6. Pt will ambulate household distances with SPC mod I for inc time.  7. Pt will complete outdoor gait trial with SPC if appropriate to increase ability to access community with LRAD.   Long term goal time span:  2-4 months    Plan:   Therapy options:  Physical therapy treatment to continue  Planned therapy interventions:  Neuromuscular Re-education (CPT 11899), Gait Training (CPT 99801), Therapeutic Activities (CPT 83322) and Therapeutic Exercise (CPT 19195)  Frequency:  2x week  Duration in weeks:  10  Discussed with:  Patient      Functional Assessment Used          Referring provider co-signature:  I have reviewed this plan of care and my co-signature certifies the need for services.    Certification  Period: 02/15/2024 to  05/15/24    Physician Signature: ________________________________ Date: ______________

## 2024-02-16 ENCOUNTER — ANTICOAGULATION MONITORING (OUTPATIENT)
Dept: VASCULAR LAB | Facility: MEDICAL CENTER | Age: 74
End: 2024-02-16
Payer: MEDICARE

## 2024-02-16 DIAGNOSIS — Z79.01 LONG TERM (CURRENT) USE OF ANTICOAGULANTS: ICD-10-CM

## 2024-02-16 DIAGNOSIS — I48.0 PAROXYSMAL ATRIAL FIBRILLATION (HCC): ICD-10-CM

## 2024-02-16 DIAGNOSIS — Z86.73 HISTORY OF ISCHEMIC STROKE: ICD-10-CM

## 2024-02-16 LAB — INR PPP: 2.5 (ref 2–3.5)

## 2024-02-16 NOTE — PROGRESS NOTES
OP   Telephone Anticoagulation Service Note      Anticoagulation Summary  As of 2024      INR goal:  2.0-3.0   TTR:  84.9% (3.6 y)   INR used for dosin.50 (2024)   Warfarin maintenance plan:  5 mg (5 mg x 1) every Wed, Sat; 10 mg (5 mg x 2) all other days   Weekly warfarin total:  60 mg   Plan last modified:  Renny Christina PharmD (2024)   Next INR check:  3/1/2024   Target end date:  Indefinite    Indications    Paroxysmal atrial fibrillation (HCC) [I48.0]  Long term (current) use of anticoagulants [Z79.01] [Z79.01]  History of ischemic stroke [Z86.73]                 Anticoagulation Episode Summary       INR check location:  Anticoagulation Clinic    Preferred lab:      Send INR reminders to:      Comments:    Only call if out of range          Anticoagulation Care Providers       Provider Role Specialty Phone number    JO ANN Padilla.  Nurse Practitioner Family [phone]          Anticoagulation Patient Findings    Patient prefers a call only if her INR is out of range.   INR is therapeutic today at 2.5.  Patient is aware to call the clinic with any changes to diet or medications, with any signs/sx of bleeding or clotting or with any questions or concerns.     Patient will continue with the current dosing regimen and will follow up again in 2 weeks.     Lei Callahan  PharmD

## 2024-02-22 ENCOUNTER — PHYSICAL THERAPY (OUTPATIENT)
Dept: PHYSICAL THERAPY | Facility: REHABILITATION | Age: 74
End: 2024-02-22
Attending: FAMILY MEDICINE
Payer: MEDICARE

## 2024-02-22 DIAGNOSIS — R26.89 BALANCE PROBLEM: ICD-10-CM

## 2024-02-22 DIAGNOSIS — Z86.73 HISTORY OF ISCHEMIC STROKE: ICD-10-CM

## 2024-02-22 PROCEDURE — 97112 NEUROMUSCULAR REEDUCATION: CPT

## 2024-02-22 PROCEDURE — 97110 THERAPEUTIC EXERCISES: CPT

## 2024-02-22 NOTE — OP THERAPY DAILY TREATMENT
Outpatient Physical Therapy  DAILY TREATMENT     [facility] [address]  [address]  [place] [address]  Phone:  [phone]  Fax:  [phone]    Date: 02/22/2024    Patient: Tonya Del Valle  YOB: 1950  MRN: [id number]     Time Calculation    Start time: 1130  Stop time: 1212 Time Calculation (min): 42 minutes         Chief Complaint: Weakness, Loss Of Balance, and Difficulty Walking    Visit #: 2    SUBJECTIVE:  Getting cataracts removed in march    OBJECTIVE:  Current objective measures:   5STS BUE: 20.56  TUG 4WW 22.34     10MWT 4WW 18.82  Gait speed .53m/s  6MWT 507 4WW no rests 4/10 fatigue      MURPHY  1. Sit to stand  4. Able to stand without using hands and stabilize independently   3. Able to stand independently using hands  2. Able to stand using hands after several tries  1. Needs min A to stand or stabilize  0. Needs mod or max A to stand    2. Stand unsupported  4. Able to stand safely for 2 min  3. Able to stand 2 min with spv  2. Able to stand 30 sec unsupported  1. Needs several tries to stand unsupported  0. Unable to stand 30 sec unsupported    3. Sitting with back unsupported  4. Able to sit safely and securely for 2 min  3. Able to sit 2 min SPV  2. Able to sit 30 sec  1. Able to sit 10 sec  0. Unable to sit without support x 10 sec    4. Stand to sit  4. Sits safely min use of hands  3. Controls descent by using hands  2. Uses back of legs against chair to control descent  1. Sits independently but uncontrolled descent  0. Needs assist to sit      5. Transfers  4.Able to transfer safely minor use of hands  3.Able to transfer safely definite use of hands  2. Able to transfer with verbal cueing or spv  1. Needs 1 person to assist  0. Needs two people to assist or spv for safety    6. Stand unsupported EC  4. Able to stand 10 sec safely  3. Able to stand 10 sec with spv  2. Able to stand 3 sec  1. Unable to keep EC 3 sec but stays safely  0.  Needs help to keep from falling    7. Standing unsupported narrow OBDULIO  4. Able to place feet independently and stand 1 min safely  3.Able to place feet independently and stand 1 min with SPV  2. Able to place feet together independently but unable to hold 30 sec  1. Needs help to attain position but able to stand 15 seconds feet together  0. Needs help to attain position but unable to stand 15 seconds feet together    8. Reaching forward outstretched arm  4. Can reach forward confidently 25 cm (10 in)  3. Can reach forward 12cm (5 in)  2. Can reach forward 5 cm ( 2 in)  1. Reaches forward but needs spv  0. Loses balance tony trying/requires external support    9.  object from floor in standing  4. Able to  slipper safely and easily  3. Able to  slipper but needs spv  2. Unable to  but reaches 2-5cm (1-2in) from slipper and keeps balance independently   1. Unable to  and needs spv to try  0. Unable to try, needs assist to avoid fall or loss of balance    10. Turn to look behind over left and right shoulders  4. Looks behind from both sides and weight shifts well  3. Looks behind one side only other side shows less weight shift  2. Turns sideways only but maintains balance  1. Needs SPV while turning  0. Needs assistance when turning    11. Turn 360 degrees  4. Able to turn 360 safely in 4 sec or less  3. Able to turn 360 one side only safely in 4 sec or less  2. Able to turn 360 safely but slowly  1. Needs close SPV or cueing  0. Needs assistance while turning      12. Place alternating LE on stool while standing unsupported  4. Able to stand independently and safely and complete 8 steps in 20 sec  3. Able to stand independently and safely and complete 8 steps in > 20 sec  2. Able to complete 4 steps without aid with SPV  1. Able to complete >2 steps min A  0. Needs assistance to keep from falling unable to try.    13. Standing Tandem unsupported  4. Able to place foot independently  and hold for 30 sec  3. Able to place foot ahead independently and hold for 30 sec  2. Able to take small step independently and hold 30 sec  1. Needs help to step but can hold 15 sec  0. Loses balance while stepping or standing    14. Single limb  4. Able to lift leg independently and hold > 10 sec  3.Able to lift leg independently and hold  5-10 sec  2. Able to lift leg independently and hold >/equal to 3 sec  1. Tries to lift leg unable to hold 3 sec but remains standing independently   0. Unable to try or needs assist to prevent fall    28/54          Therapeutic Exercises (CPT 45602):     1. shuttle squat inclined back, 4 x 15, 4 cords    2. shuttle single le squat, 2 x 15 B, Rt is, lt 3 cords, Rt 2 cords    3. supine glute stretch, 2 x ` min B    4. sidelying clamshell, 3 x 10 5 sec    5. SLR/knee slides unable    20. 2/15-5/15      Therapeutic Exercise Summary:  HEP: STS up BUE down no UE 3 x10, ankle DF 3 x 10, x 10 sec iso        Access Code: [email or web address]  URL: [email or web address]  Date: 02/22/2024  Prepared by:     Exercises  - Supine Gluteus Stretch  - 1 x daily - 7 x weekly - 5 reps - 1 min hold  - Supine Piriformis Stretch with Foot on Ground  - 1 x daily - 7 x weekly - 5 reps - 1 min hold  - Clam  - 1 x daily - 7 x weekly - 3 sets - 10 reps - 5 hold    Therapeutic Treatments and Modalities:     1. Neuromuscular Re-education (CPT 24534)    Therapeutic Treatment and Modalities Summary: MURPHY see objective      Time-based treatments/modalities:    Physical Therapy Timed Treatment Charges  Neuromusc re-ed, balance, coor, post minutes (CPT 77236): 15 minutes  Therapeutic exercise minutes (CPT 62322): 27 minutes          ASSESSMENT:   Response to treatment: Pt demonstrates high fll risk via MURPHY score and generalized fear associated with standing without UE support. Added clamshells and glute stretch to HEP to to dec hip IR/muscle restriction    PLAN/RECOMMENDATIONS:   Plan for treatment: therapy  treatment to continue next visit.  Planned interventions for next visit: continue with current treatment.

## 2024-02-29 ENCOUNTER — APPOINTMENT (OUTPATIENT)
Dept: PHYSICAL THERAPY | Facility: REHABILITATION | Age: 74
End: 2024-02-29
Attending: FAMILY MEDICINE
Payer: MEDICARE

## 2024-03-01 ENCOUNTER — ANTICOAGULATION MONITORING (OUTPATIENT)
Dept: VASCULAR LAB | Facility: MEDICAL CENTER | Age: 74
End: 2024-03-01
Payer: MEDICARE

## 2024-03-01 DIAGNOSIS — Z79.01 LONG TERM (CURRENT) USE OF ANTICOAGULANTS: ICD-10-CM

## 2024-03-01 DIAGNOSIS — I48.0 PAROXYSMAL ATRIAL FIBRILLATION (HCC): ICD-10-CM

## 2024-03-01 DIAGNOSIS — Z86.73 HISTORY OF ISCHEMIC STROKE: ICD-10-CM

## 2024-03-01 LAB — INR PPP: 2.7 (ref 2–3.5)

## 2024-03-01 NOTE — PROGRESS NOTES
Anticoagulation Summary  As of 3/1/2024      INR goal:  2.0-3.0   TTR:  85.1% (3.6 y)   INR used for dosin.70 (3/1/2024)   Warfarin maintenance plan:  5 mg (5 mg x 1) every Wed, Sat; 10 mg (5 mg x 2) all other days   Weekly warfarin total:  60 mg   Plan last modified:  Renny Christina PharmD (2024)   Next INR check:  3/15/2024   Target end date:  Indefinite    Indications    Paroxysmal atrial fibrillation (HCC) [I48.0]  Long term (current) use of anticoagulants [Z79.01] [Z79.01]  History of ischemic stroke [Z86.73]                 Anticoagulation Episode Summary       INR check location:  Anticoagulation Clinic    Preferred lab:      Send INR reminders to:      Comments:    Only call if out of range          Anticoagulation Care Providers       Provider Role Specialty Phone number    MILO Padilla  Nurse Practitioner Family [phone]            Refer to Anticoagulation Patient Findings for HPI    INR therapeutic at 2.7.      Per chart review pt prefers no phone call if INR in range.    Pt to continue with current warfarin dosing regimen.     Will follow up in 2 week(s).     Renny Christina, PharmD, BCACP

## 2024-03-07 ENCOUNTER — APPOINTMENT (OUTPATIENT)
Dept: PHYSICAL THERAPY | Facility: REHABILITATION | Age: 74
End: 2024-03-07
Attending: FAMILY MEDICINE
Payer: MEDICARE

## 2024-03-14 ENCOUNTER — PHYSICAL THERAPY (OUTPATIENT)
Dept: PHYSICAL THERAPY | Facility: REHABILITATION | Age: 74
End: 2024-03-14
Attending: FAMILY MEDICINE
Payer: MEDICARE

## 2024-03-14 DIAGNOSIS — Z86.73 HISTORY OF ISCHEMIC STROKE: ICD-10-CM

## 2024-03-14 DIAGNOSIS — R26.89 BALANCE PROBLEM: ICD-10-CM

## 2024-03-14 PROCEDURE — 97110 THERAPEUTIC EXERCISES: CPT

## 2024-03-14 PROCEDURE — 97112 NEUROMUSCULAR REEDUCATION: CPT

## 2024-03-14 NOTE — OP THERAPY DAILY TREATMENT
"  Outpatient Physical Therapy  DAILY TREATMENT     [facility] [address].  [address]  [place] [address]  Phone:  [phone]  Fax:  [phone]    Date: 03/14/2024    Patient: Tonya Del Valle  YOB: 1950  MRN: [id number]     Time Calculation    Start time: 1130  Stop time: 1211 Time Calculation (min): 41 minutes         Chief Complaint: Weakness, Loss Of Balance, and Difficulty Walking    Visit #: 3    SUBJECTIVE:  Has cataract surgery next week.     OBJECTIVE:  Current objective measures:   5STS BUE: 20.56  TUG 4WW 22.34     10MWT 4WW 18.82  Gait speed .53m/s  6MWT 507 4WW no rests 4/10 fatigue      MURPHY   28/54      PN/3/14  5STS BUE 15.7  TUG 4WW: 20.11  10MWT: 15.62  Speed:.64m/s      Therapeutic Exercises (CPT 37195):     1. TUG, 5STS, 10MWT    2. STS no UE 20\" table, 2 x 10 CGA    3. split STS Rt LE bias 20\" table, 3 x 10 CGA    20. 2/15-5/15      Therapeutic Exercise Summary:  HEP: STS up BUE down no UE 3 x10, ankle DF 3 x 10, x 10 sec iso        Access Code: [email or web address]  URL: [email or web address]  Date: 02/22/2024  Prepared by:     Exercises  - Supine Gluteus Stretch  - 1 x daily - 7 x weekly - 5 reps - 1 min hold  - Supine Piriformis Stretch with Foot on Ground  - 1 x daily - 7 x weekly - 5 reps - 1 min hold  - Clam  - 1 x daily - 7 x weekly - 3 sets - 10 reps - 5 hold    Therapeutic Treatments and Modalities:     1. Neuromuscular Re-education (CPT 03254)    Therapeutic Treatment and Modalities Summary:   Dyanmic balance  EC horiz and vert HT normal OBDULIO x 1 min eacg CGA-pt ferful but no significant lOB  EO reaching overhead simulated shelf with 1 # weight reaching with left, hand to right, reach with Rt x 10, then reaching with left x 10 with VC for head turn to simulate look at the shelf CGA/SBA 1# DB  Performed lateral reaching outside OBDULIO with trunk rotation with PT UE for target, limited reaching, fearful x 10 reps each 1 # DB  To improve " balance and progress towards picking item up pt held 1# DB cues to bend/squat and touch shin x 10 reps each CGA, cues to dec trunk flexion and inc hip hinge/knee flexion    Time-based treatments/modalities:    Physical Therapy Timed Treatment Charges  Neuromusc re-ed, balance, coor, post minutes (CPT 84174): 24 minutes  Therapeutic exercise minutes (CPT 45553): 17 minutes          ASSESSMENT:   Response to treatment  Due to difficulties with scheduling pt has only completed 2 follow up since evaluation 1 month ago. At this time she has been compliant with HEP and has improvement on functional testing including 5STS and TUG. Tonya continues to have significant impairments including weakness, balance, and gait deficits secondary to chronic CVA and will continue to benefit from skilled PT 2 x 8 weeks to improve above remaining limitations, dec fall risk, and maximize function.       Short Term Goals:   Pt will be compliant with HEP 3-5x per week for improving strength and stabilty. Cont-progressing  Pt will complete gait trial with SPC. Cont-have not been able as pt only attended twice since eval  Pt will complete most appropriate balance outcome measure. met        Short term goal time span:  4-6 weeks      Long Term Goals:    1. Pt will demonstrate improved functional mobility with TUG score 15 sec or better.  2. Pt will demonstrate improved functional LE strength with 5STS 13 sec or better.  3. Pt will demonstrate improved CV endurance and gait speed with 6MWT score 650 feet or better.  4. Pt will demonstrate improved balance scoring low fall risk on most appropriate outcome measure. Cont-not reassessed  5. Pt will demonstrate improved gait speed with 10MWT with 4WW of .8m/s or better to dec fall risk.  cont  6. Pt will ambulate household distances with SPC mod I for inc time. cont  7. Pt will complete outdoor gait trial with SPC if appropriate to increase ability to access community with LRAD.  cont    PLAN/RECOMMENDATIONS:   Plan for treatment: therapy treatment to continue next visit.  Planned interventions for next visit: continue with current treatment.

## 2024-03-14 NOTE — OP THERAPY PROGRESS SUMMARY
Outpatient Physical Therapy  PROGRESS SUMMARY NOTE      [facility] [place] [address]  [address]  [place] [address]  Phone:  [phone]  Fax:  [phone]    Date of Visit: 03/14/2024    Patient: Tonya Del Valle  YOB: 1950  MRN: [id number]     Referring Provider: Tucker Browning M.D.  [facility] [facility] [address]  [place] [address]   Referring Diagnosis Personal history of transient ischemic attack (TIA), and cerebral infarction without residual deficits [Z86.73];Other abnormalities of gait and mobility [R26.89]     Visit Diagnoses     ICD-10-CM   1. Balance problem  R26.89   2. History of ischemic stroke  Z86.73       Rehab Potential: good    Progress Report Period: 2/15-3/14/2024    Functional Assessment Used          Objective Findings and Assessment:   Patient progression towards goals: Due to difficulties with scheduling pt has only completed 2 follow up since evaluation 1 month ago. At this time she has been compliant with HEP and has improvement on functional testing including 5STS and TUG. Tonya continues to have significant impairments including weakness, balance, and gait deficits secondary to chronic CVA and will continue to benefit from skilled PT 2 x 8 weeks to improve above remaining limitations, dec fall risk, and maximize function.       Short Term Goals:   Pt will be compliant with HEP 3-5x per week for improving strength and stabilty. Cont-progressing  Pt will complete gait trial with SPC. Cont-have not been able as pt only attended twice since eval  Pt will complete most appropriate balance outcome measure. met        Short term goal time span:  4-6 weeks      Long Term Goals:    1. Pt will demonstrate improved functional mobility with TUG score 15 sec or better.  2. Pt will demonstrate improved functional LE strength with 5STS 13 sec or better.  3. Pt will demonstrate improved CV endurance and gait speed with 6MWT score 650 feet or  better.  4. Pt will demonstrate improved balance scoring low fall risk on most appropriate outcome measure. Cont-not reassessed  5. Pt will demonstrate improved gait speed with 10MWT with 4WW of .8m/s or better to dec fall risk.  cont  6. Pt will ambulate household distances with SPC mod I for inc time. cont  7. Pt will complete outdoor gait trial with SPC if appropriate to increase ability to access community with LRAD. cont    Objective findings and assessment details: 5STS BUE 15.7  TUG 4WW: 20.11  10MWT: 15.62  Speed:.64m/s    Goals:   Short Term Goals:   Pt will be compliant with HEP 3-5x per week for improving strength and stabilty.   Pt will complete gait trial with SPC.   Short term goal time span:  2-4 weeks      Long Term Goals:    1. Pt will demonstrate improved functional mobility with TUG score 15 sec or better.  2. Pt will demonstrate improved functional LE strength with 5STS 13 sec or better.  3. Pt will demonstrate improved CV endurance and gait speed with 6MWT score 650 feet or better.  4. Pt will demonstrate improved balance scoring low fall risk on most appropriate outcome measure.   5. Pt will demonstrate improved gait speed with 10MWT with 4WW of .8m/s or better to dec fall risk.    6. Pt will ambulate household distances with SPC mod I for inc time.   7. Pt will complete outdoor gait trial with SPC if appropriate to increase ability to access community with LRAD  Long term goal time span:  6-8 weeks    Plan:   Planned therapy interventions:  Gait Training (CPT 00879), Functional Training, Self Care (CPT 40039), Therapeutic Activities (CPT 12660), Therapeutic Exercise (CPT 85248) and Neuromuscular Re-education (CPT 30958)  Frequency:  2x week  Duration in weeks:  8      Referring provider co-signature:  I have reviewed this plan of care and my co-signature certifies the need for services.     Certification Period: 03/14/2024 to 05/09/24    Physician Signature: ________________________________  Date: ______________

## 2024-03-15 LAB — INR PPP: 2.6 (ref 2–3.5)

## 2024-03-18 ENCOUNTER — ANTICOAGULATION MONITORING (OUTPATIENT)
Dept: MEDICAL GROUP | Facility: PHYSICIAN GROUP | Age: 74
End: 2024-03-18
Payer: MEDICARE

## 2024-03-18 DIAGNOSIS — I48.0 PAROXYSMAL ATRIAL FIBRILLATION (HCC): ICD-10-CM

## 2024-03-18 DIAGNOSIS — Z86.73 HISTORY OF ISCHEMIC STROKE: ICD-10-CM

## 2024-03-18 DIAGNOSIS — Z79.01 LONG TERM (CURRENT) USE OF ANTICOAGULANTS: ICD-10-CM

## 2024-03-18 NOTE — PROGRESS NOTES
Anticoagulation Summary  As of 3/18/2024      INR goal:  2.0-3.0   TTR:  85.3% (3.7 y)   INR used for dosin.60 (3/15/2024)   Warfarin maintenance plan:  5 mg (5 mg x 1) every Wed, Sat; 10 mg (5 mg x 2) all other days   Weekly warfarin total:  60 mg   Plan last modified:  Renny Christina PharmD (2024)   Next INR check:  2024   Target end date:  Indefinite    Indications    Paroxysmal atrial fibrillation (HCC) [I48.0]  Long term (current) use of anticoagulants [Z79.01] [Z79.01]  History of ischemic stroke [Z86.73]                 Anticoagulation Episode Summary       INR check location:  Anticoagulation Clinic    Preferred lab:      Send INR reminders to:      Comments:    Only call if out of range          Anticoagulation Care Providers       Provider Role Specialty Phone number    MILO Padilla  Nurse Practitioner Family [phone]          Anticoagulation Patient Findings    INR therapeutic at 2.6.  Per chart notes, patient requests contact only when out of range.  Pt is to continue with current warfarin dosing regimen.  Follow up in 2 weeks, to reduce risk of adverse events related to this high risk medication,  Warfarin.    Nestor Burch, PharmD, BCACP

## 2024-03-21 ENCOUNTER — APPOINTMENT (OUTPATIENT)
Dept: PHYSICAL THERAPY | Facility: REHABILITATION | Age: 74
End: 2024-03-21
Attending: FAMILY MEDICINE
Payer: MEDICARE

## 2024-03-28 ENCOUNTER — APPOINTMENT (OUTPATIENT)
Dept: PHYSICAL THERAPY | Facility: REHABILITATION | Age: 74
End: 2024-03-28
Attending: FAMILY MEDICINE
Payer: MEDICARE

## 2024-03-29 ENCOUNTER — ANTICOAGULATION MONITORING (OUTPATIENT)
Dept: VASCULAR LAB | Facility: MEDICAL CENTER | Age: 74
End: 2024-03-29
Payer: MEDICARE

## 2024-03-29 DIAGNOSIS — I48.0 PAROXYSMAL ATRIAL FIBRILLATION (HCC): ICD-10-CM

## 2024-03-29 DIAGNOSIS — Z79.01 LONG TERM (CURRENT) USE OF ANTICOAGULANTS: ICD-10-CM

## 2024-03-29 DIAGNOSIS — Z86.73 HISTORY OF ISCHEMIC STROKE: ICD-10-CM

## 2024-03-29 LAB — INR PPP: 2.3 (ref 2–3.5)

## 2024-03-30 NOTE — PROGRESS NOTES
Anticoagulation Summary  As of 3/29/2024      INR goal:  2.0-3.0   TTR:  85.4% (3.7 y)   INR used for dosin.30 (3/29/2024)   Warfarin maintenance plan:  5 mg (5 mg x 1) every Wed, Sat; 10 mg (5 mg x 2) all other days   Weekly warfarin total:  60 mg   Plan last modified:  Renny Christina PharmD (2024)   Next INR check:  2024   Target end date:  Indefinite    Indications    Paroxysmal atrial fibrillation (HCC) [I48.0]  Long term (current) use of anticoagulants [Z79.01] [Z79.01]  History of ischemic stroke [Z86.73]                 Anticoagulation Episode Summary       INR check location:  Anticoagulation Clinic    Preferred lab:      Send INR reminders to:      Comments:    Only call if out of range          Anticoagulation Care Providers       Provider Role Specialty Phone number    MILO Padilla  Nurse Practitioner Family [phone]            Refer to Anticoagulation Patient Findings for HPI    INR therapeutic at 2.3.      Per chart review pt prefers no phone call if INR in range.    Pt to continue with current warfarin dosing regimen.     Will follow up in 2 week(s).     Renny Christina, PharmD, BCACP

## 2024-04-04 ENCOUNTER — PHYSICAL THERAPY (OUTPATIENT)
Dept: PHYSICAL THERAPY | Facility: REHABILITATION | Age: 74
End: 2024-04-04
Attending: FAMILY MEDICINE
Payer: MEDICARE

## 2024-04-04 DIAGNOSIS — Z86.73 HISTORY OF ISCHEMIC STROKE: ICD-10-CM

## 2024-04-04 DIAGNOSIS — R26.89 BALANCE PROBLEM: ICD-10-CM

## 2024-04-04 PROCEDURE — 97116 GAIT TRAINING THERAPY: CPT

## 2024-04-04 PROCEDURE — 97112 NEUROMUSCULAR REEDUCATION: CPT

## 2024-04-04 NOTE — OP THERAPY DAILY TREATMENT
Outpatient Physical Therapy  DAILY TREATMENT     [facility] Physical Therapy [address]  [address]  [place] [address]  Phone:  [phone]  Fax:  [phone]    Date: 04/04/2024    Patient: Tonya Del Valle  YOB: 1950  MRN: [id number]     Time Calculation    Start time: 1120  Stop time: 1214 Time Calculation (min): 54 minutes         Chief Complaint: Difficulty Walking, Weakness, and Loss Of Balance    Visit #: 4    SUBJECTIVE:  Feels good after cataract surgery. Personal goal is to alk with SBQC bringing her  a cup of coffee. Pt reports very scared of falling but can get up from floor with UE support  Agreeable to start early  OBJECTIVE:  Current objective measures:   5STS BUE: 20.56  TUG 4WW 22.34     10MWT 4WW 18.82  Gait speed .53m/s  6MWT 507 4WW no rests 4/10 fatigue      MURPHY   28/54      PN/3/14  5STS BUE 15.7  TUG 4WW: 20.11  10MWT: 15.62  Speed:.64m/s      Therapeutic Exercises (CPT 58712):     20. 2/15-5/15      Therapeutic Exercise Summary:  HEP: STS up BUE down no UE 3 x10, ankle DF 3 x 10, x 10 sec iso        Access Code: [email or web address]  URL: [email or web address]  Date: 02/22/2024  Prepared by:     Exercises  - Supine Gluteus Stretch  - 1 x daily - 7 x weekly - 5 reps - 1 min hold  - Supine Piriformis Stretch with Foot on Ground  - 1 x daily - 7 x weekly - 5 reps - 1 min hold  - Clam  - 1 x daily - 7 x weekly - 3 sets - 10 reps - 5 hold    Therapeutic Treatments and Modalities:     1. Gait Training (CPT 19832)    2. Neuromuscular Re-education (CPT 29460)    Therapeutic Treatment and Modalities Summary: Gait:  For improving independence and gait mechanics with SBQC pt ambulated 3 x 20 feet, 60 feet with rests as needed. Demos high anxiety with Rt UE in mid guard position, step too pattern. Emphasis of intervention on improving left step length to more recip patter. Pt demod improvement with reps and improved confidence but continued to have step to  "pattern firs 3-4 steps after seated rest. Pt rests more so anxiety than fatigue related per pt. First 20 ft CGA others all SPV without LOB    Discussed home walking program with SBQC to improve confidence and gait mechanics. Encouraged short bouts of 10-20 feet with SBQC when her spouse is home, and not when garth is tired or at night for safety. Pt verbalized agreement and reports excitement to try    NMR:  Time providing education for safety after falling including after a fall assessing for injury, importance of resting and developing a plan, crawling to surface for UE support, then completing transfer. Emphasized importance of resting often to decrease risk of falling again.     For improving dynamic balance while ambulating with LRAD, SBQC, performed simulated coffee cup carrying activity starting first with 2# DB in RT UE 2 x 20 feet SPV. Progressed to water cup with less than 1/2 water in cup 3 x 20 feet. Pt reported sig anxiety associated that improved with reps. Further progressed complexity adding more water, approx 1 inch from top to cup 4 x 20 feet SPV. Cues occ for inc left step length. ON 3rd bout did spill some of water when she was readjusting foot positioning after feeling \"out of sync.\"    Time-based treatments/modalities:    Physical Therapy Timed Treatment Charges  Gait training minutes (CPT 97933): 25 minutes  Neuromusc re-ed, balance, coor, post minutes (CPT 17695): 29 minutes          ASSESSMENT:   Response to treatment: Encouraged pt to ambulate a few times a day at home, short periods 10-20 feet, with SBQC. Emphasis that for safety pt should minimize distractions and multi tasking as well as not using it when fatigued.demod sig dec in speed and inc M/L sway with ambulation during multi tasking but greatest inc in overall fear/anxiety.   PLAN/RECOMMENDATIONS:   Plan for treatment: therapy treatment to continue next visit.  Planned interventions for next visit: continue with current " treatment.

## 2024-04-11 ENCOUNTER — PHYSICAL THERAPY (OUTPATIENT)
Dept: PHYSICAL THERAPY | Facility: REHABILITATION | Age: 74
End: 2024-04-11
Attending: FAMILY MEDICINE
Payer: MEDICARE

## 2024-04-11 DIAGNOSIS — R26.89 BALANCE PROBLEM: ICD-10-CM

## 2024-04-11 DIAGNOSIS — Z86.73 HISTORY OF ISCHEMIC STROKE: ICD-10-CM

## 2024-04-11 PROCEDURE — 97110 THERAPEUTIC EXERCISES: CPT

## 2024-04-11 PROCEDURE — 97112 NEUROMUSCULAR REEDUCATION: CPT

## 2024-04-11 NOTE — OP THERAPY PROGRESS SUMMARY
Outpatient Physical Therapy  PROGRESS SUMMARY NOTE      [facility] Physical Therapy [place] [address]  [address]  [place] [address]  Phone:  [phone]  Fax:  [phone]    Date of Visit: 04/11/2024    Patient: Tonya Del Valle  YOB: 1950  MRN: [id number]     Referring Provider: Tucker Browning M.D.  [facility]  [place] [address]   Referring Diagnosis Other abnormalities of gait and mobility [R26.89];Personal history of transient ischemic attack (TIA), and cerebral infarction without residual deficits [Z86.73]     Visit Diagnoses     ICD-10-CM   1. Balance problem  R26.89   2. History of ischemic stroke  Z86.73       Rehab Potential: good    Progress Report Period: 3/14-4/11    Functional Assessment Used          Objective Findings and Assessment:   Patient progression towards goals: Due to scheduling difficulties and pt break for cataract surgery has only completed 1 follow up since last PN and 4 visits since SOC. Since last progress note apt able to perform indoor gait trial with SBQC without sig LOB and has been able to safely practice at home. She has also demonstrated good progress with functional assessments including MURPHY and able to assess TUG with LRAD. She will continue to benefit from skilled PT 10x 8 weeks to improve balance, gait, and strength to improve confidence, maximize function and dec fall risk.     Short Term Goals:   Pt will be compliant with HEP 3-5x per week for improving strength and stabilty. Cont-progressing  Pt will complete gait trial with SBQC. Met  New Goal: Pt will demonstrate improved functional mobility with SBQC with TUG score 30 sec or better,  Short term goal time span:  2-4 weeks      Long Term Goals:    1. Pt will demonstrate improved functional mobility with TUG score 15 sec or better. Cont improved with 4WW to   2. Pt will demonstrate improved functional LE strength with 5STS 13 sec or better.  3. Pt will  demonstrate improved CV endurance and gait speed with 6MWT score 650 feet or better.  4. Pt will demonstrate improved balance scoring low fall risk on most appropriate outcome measure.   5. Pt will demonstrate improved gait speed with 10MWT with 4WW of .8m/s or better to dec fall risk.    6. Pt will ambulate household distances with SBQC mod I for inc time.   7. Pt will complete outdoor gait trial with SBQC if appropriate to increase ability to access community with LRAD     Objective findings and assessment details: TUG 4WW: 17.89  TUG SBQC 44.5, 52.3, 43.5  5STS BUE 14.17 (unable no UE)  6MWT 550 feet 4WW  10MWT 15.7 4WW    MURPHY 32/56    Goals:   Short Term Goals:   Pt will be compliant with HEP 3-5x per week for improving strength and stabilty.   Pt will demonstrate improved functional mobility with SBQC with TUG score 30 sec or better,  Short term goal time span:  2-4 weeks      Long Term Goals:    1. Pt will demonstrate improved functional mobility with TUG score 15 sec or better.   2. Pt will demonstrate improved functional LE strength with 5STS 13 sec or better.  3. Pt will demonstrate improved CV endurance and gait speed with 6MWT score 650 feet or better.  4. Pt will demonstrate improved balance scoring low fall risk on most appropriate outcome measure.   5. Pt will demonstrate improved gait speed with 10MWT with 4WW of .8m/s or better to dec fall risk.    6. Pt will ambulate household distances with SBQC mod I for inc time.   7. Pt will complete outdoor gait trial with SBQC if appropriate to increase ability to access community with LRAD   Long term goal time span:  6-8 weeks    Plan:   Planned therapy interventions:  Gait Training (CPT 17656), Therapeutic Activities (CPT 38913), Therapeutic Exercise (CPT 73457) and Neuromuscular Re-education (CPT 40809)  Frequency:  2x week  Duration in weeks:  8      Referring provider co-signature:  I have reviewed this plan of care and my co-signature certifies the  need for services.     Certification Period: 04/11/2024 to 06/06/24    Physician Signature: ________________________________ Date: ______________

## 2024-04-11 NOTE — OP THERAPY DAILY TREATMENT
Outpatient Physical Therapy  DAILY TREATMENT     [facility] Physical [address]  [address]  [place] [address]  Phone:  [phone]  Fax:  [phone]    Date: 04/11/2024    Patient: Tonya Del Valle  YOB: 1950  MRN: [id number]     Time Calculation    Start time: 1120  Stop time: 1204 Time Calculation (min): 44 minutes         Chief Complaint: Weakness, Loss Of Balance, and Difficulty Walking    Visit #: 5    SUBJECTIVE:  Has started walking with cane almost all the time indoors unless tired.         OBJECTIVE:  Current objective measures:         PN/3/14  5STS BUE 15.7  TUG 4WW: 20.11  10MWT: 15.62  Speed:.64m/s      4/11 PN  TUG 4WW: 17.89  TUG SBQC 44.5, 52.3, 43.5  5STS BUE 14.17 (unable no UE)  6MWT 550 feet 4WW  10MWT 15.7 4WW        MURPHY  1. Sit to stand  4. Able to stand without using hands and stabilize independently   3. Able to stand independently using hands  2. Able to stand using hands after several tries  1. Needs min A to stand or stabilize  0. Needs mod or max A to stand    2. Stand unsupported  4. Able to stand safely for 2 min  3. Able to stand 2 min with spv  2. Able to stand 30 sec unsupported  1. Needs several tries to stand unsupported  0. Unable to stand 30 sec unsupported    3. Sitting with back unsupported  4. Able to sit safely and securely for 2 min  3. Able to sit 2 min SPV  2. Able to sit 30 sec  1. Able to sit 10 sec  0. Unable to sit without support x 10 sec    4. Stand to sit  4. Sits safely min use of hands  3. Controls descent by using hands  2. Uses back of legs against chair to control descent  1. Sits independently but uncontrolled descent  0. Needs assist to sit      5. Transfers  4.Able to transfer safely minor use of hands  3.Able to transfer safely definite use of hands  2. Able to transfer with verbal cueing or spv  1. Needs 1 person to assist  0. Needs two people to assist or spv for safety    6. Stand unsupported EC  4.  Able to stand 10 sec safely  3. Able to stand 10 sec with spv  2. Able to stand 3 sec  1. Unable to keep EC 3 sec but stays safely  0. Needs help to keep from falling    7. Standing unsupported narrow OBDULIO  4. Able to place feet independently and stand 1 min safely  3.Able to place feet independently and stand 1 min with SPV  2. Able to place feet together independently but unable to hold 30 sec  1. Needs help to attain position but able to stand 15 seconds feet together  0. Needs help to attain position but unable to stand 15 seconds feet together    8. Reaching forward outstretched arm  4. Can reach forward confidently 25 cm (10 in)  3. Can reach forward 12cm (5 in)  2. Can reach forward 5 cm ( 2 in)  1. Reaches forward but needs spv  0. Loses balance tony trying/requires external support    9.  object from floor in standing  4. Able to  slipper safely and easily  3. Able to  slipper but needs spv  2. Unable to  but reaches 2-5cm (1-2in) from slipper and keeps balance independently   1. Unable to  and needs spv to try  0. Unable to try, needs assist to avoid fall or loss of balance    10. Turn to look behind over left and right shoulders  4. Looks behind from both sides and weight shifts well  3. Looks behind one side only other side shows less weight shift  2. Turns sideways only but maintains balance  1. Needs SPV while turning  0. Needs assistance when turning    11. Turn 360 degrees  4. Able to turn 360 safely in 4 sec or less  3. Able to turn 360 one side only safely in 4 sec or less  2. Able to turn 360 safely but slowly  1. Needs close SPV or cueing  0. Needs assistance while turning      12. Place alternating LE on stool while standing unsupported  4. Able to stand independently and safely and complete 8 steps in 20 sec  3. Able to stand independently and safely and complete 8 steps in > 20 sec  2. Able to complete 4 steps without aid with SPV  1. Able to complete >2 steps  min A  0. Needs assistance to keep from falling unable to try.    13. Standing Tandem unsupported  4. Able to place foot independently and hold for 30 sec  3. Able to place foot ahead independently and hold for 30 sec  2. Able to take small step independently and hold 30 sec  1. Needs help to step but can hold 15 sec  0. Loses balance while stepping or standing    14. Single limb  4. Able to lift leg independently and hold > 10 sec  3.Able to lift leg independently and hold  5-10 sec  2. Able to lift leg independently and hold >/equal to 3 sec  1. Tries to lift leg unable to hold 3 sec but remains standing independently   0. Unable to try or needs assist to prevent fall     32/56    Therapeutic Exercises (CPT 22197):     1. 5STS, TUG, 10MWT, 6MWT    2. encouraged inc awareness of step to vs recip gait with cane when ambulating at home    3. STS left UE, 3 x 10    20. 2/15-5/15      Therapeutic Exercise Summary:  HEP: STS up BUE down no UE 3 x10, ankle DF 3 x 10, x 10 sec iso  Progressed STS 1 UE up, no UE down        Access Code: [email or web address]  URL: [email or web address]  Date: 02/22/2024  Prepared by:     Exercises  - Supine Gluteus Stretch  - 1 x daily - 7 x weekly - 5 reps - 1 min hold  - Supine Piriformis Stretch with Foot on Ground  - 1 x daily - 7 x weekly - 5 reps - 1 min hold  - Clam  - 1 x daily - 7 x weekly - 3 sets - 10 reps - 5 hold    Therapeutic Treatments and Modalities:     2. Neuromuscular Re-education (CPT 13728)    Therapeutic Treatment and Modalities Summary: NMR:MURPHY see objective  *fearful of any body sway even WNL    Time-based treatments/modalities:    Physical Therapy Timed Treatment Charges  Neuromusc re-ed, balance, coor, post minutes (CPT 90137): 20 minutes  Therapeutic exercise minutes (CPT 22821): 24 minutes          ASSESSMENT:   Response to treatment:   Due to scheduling difficulties and pt break for cataract surgery has only completed 1 follow up since last PN and 4 visits since  SOC. Since last progress note apt able to perform indoor gait trial with SBQC without sig LOB and has been able to safely practice at home. She has also demonstrated good progress with functional assessments including MURPHY and able to assess TUG with LRAD. She will continue to benefit from skilled PT 10x 8 weeks to improve balance, gait, and strength to improve confidence, maximize function and dec fall risk.     Short Term Goals:   Pt will be compliant with HEP 3-5x per week for improving strength and stabilty. Cont-progressing  Pt will complete gait trial with SBQC. Met  New Goal: Pt will demonstrate improved functional mobility with SBQC with TUG score 30 sec or better,  Short term goal time span:  2-4 weeks      Long Term Goals:    1. Pt will demonstrate improved functional mobility with TUG score 15 sec or better. Cont improved with 4WW to   2. Pt will demonstrate improved functional LE strength with 5STS 13 sec or better.  3. Pt will demonstrate improved CV endurance and gait speed with 6MWT score 650 feet or better.  4. Pt will demonstrate improved balance scoring low fall risk on most appropriate outcome measure.   5. Pt will demonstrate improved gait speed with 10MWT with 4WW of .8m/s or better to dec fall risk.    6. Pt will ambulate household distances with SBQC mod I for inc time.   7. Pt will complete outdoor gait trial with SBQC if appropriate to increase ability to access community with LRAD   PLAN/RECOMMENDATIONS: gait, static balnace  Plan for treatment: therapy treatment to continue next visit.  Planned interventions for next visit: continue with current treatment.

## 2024-04-12 ENCOUNTER — ANTICOAGULATION MONITORING (OUTPATIENT)
Dept: VASCULAR LAB | Facility: MEDICAL CENTER | Age: 74
End: 2024-04-12
Payer: MEDICARE

## 2024-04-12 DIAGNOSIS — I48.0 PAROXYSMAL ATRIAL FIBRILLATION (HCC): ICD-10-CM

## 2024-04-12 DIAGNOSIS — Z79.01 LONG TERM (CURRENT) USE OF ANTICOAGULANTS: ICD-10-CM

## 2024-04-12 DIAGNOSIS — Z86.73 HISTORY OF ISCHEMIC STROKE: ICD-10-CM

## 2024-04-12 LAB — INR PPP: 2.3 (ref 2–3.5)

## 2024-04-12 NOTE — PROGRESS NOTES
OP   Telephone Anticoagulation Service Note      Anticoagulation Summary  As of 2024      INR goal:  2.0-3.0   TTR:  85.6% (3.7 y)   INR used for dosin.30 (2024)   Warfarin maintenance plan:  5 mg (5 mg x 1) every Wed, Sat; 10 mg (5 mg x 2) all other days   Weekly warfarin total:  60 mg   Plan last modified:  Renny Christina PharmD (2024)   Next INR check:  2024   Target end date:  Indefinite    Indications    Paroxysmal atrial fibrillation (HCC) [I48.0]  Long term (current) use of anticoagulants [Z79.01] [Z79.01]  History of ischemic stroke [Z86.73]                 Anticoagulation Episode Summary       INR check location:  Anticoagulation Clinic    Preferred lab:      Send INR reminders to:      Comments:    Only call if out of range          Anticoagulation Care Providers       Provider Role Specialty Phone number    JO ANN Padilla.  Nurse Practitioner Family [phone]          Anticoagulation Patient Findings    Patient prefers we only call if her INR is out of range.   INR is therapeutic today at 2.3.  Patient is aware to call the clinic with any changes to diet or medications, with any signs/sx of bleeding or clotting or with any questions or concerns.     Patient instructed to continue with the current warfarin dosing regimen, and asked to follow up again in 2 weeks.     Lei Callahan  PharmD

## 2024-04-18 ENCOUNTER — PHYSICAL THERAPY (OUTPATIENT)
Dept: PHYSICAL THERAPY | Facility: REHABILITATION | Age: 74
End: 2024-04-18
Attending: FAMILY MEDICINE
Payer: MEDICARE

## 2024-04-18 DIAGNOSIS — R26.89 BALANCE PROBLEM: ICD-10-CM

## 2024-04-18 DIAGNOSIS — Z86.73 HISTORY OF ISCHEMIC STROKE: ICD-10-CM

## 2024-04-18 PROCEDURE — 97112 NEUROMUSCULAR REEDUCATION: CPT

## 2024-04-18 PROCEDURE — 97110 THERAPEUTIC EXERCISES: CPT

## 2024-04-18 NOTE — OP THERAPY DAILY TREATMENT
Outpatient Physical Therapy  DAILY TREATMENT     [facility] Physical [address]  [address]  [place] [address]  Phone:  [phone]  Fax:  [phone]    Date: 04/18/2024    Patient: Tonya Del Valle  YOB: 1950  MRN: [id number]     Time Calculation    Start time: 1130  Stop time: 1210 Time Calculation (min): 40 minutes         Chief Complaint: Weakness and Loss Of Balance    Visit #: 6    SUBJECTIVE:  Looked up some things on youtube and wants to do strengthening today rather than gait/balance.        OBJECTIVE:  Current objective measures:         PN/3/14  5STS BUE 15.7  TUG 4WW: 20.11  10MWT: 15.62  Speed:.64m/s      4/11 PN  TUG 4WW: 17.89  TUG SBQC 44.5, 52.3, 43.5  5STS BUE 14.17 (unable no UE)  6MWT 550 feet 4WW  10MWT 15.7 4WW        MURPHY    32/56    Therapeutic Exercises (CPT 02397):     1. Step ups BUE railings, 3 x 5 SPV, HEP    2. shuttle squats BLE, 3 x 15, 5 cords    3. Shuttle squat SL, 2 x 15, 4 cords    4. STS, 5# x 8    20. 4/11-6/6      Therapeutic Exercise Summary:  HEP: STS up BUE down no UE 3 x10, ankle DF 3 x 10, x 10 sec iso  Progressed STS 1 UE up, no UE down      Step ups 3 x 5, BUE Rt LE  Access Code: [email or web address]  URL: [email or web address]  Date: 02/22/2024  Prepared by:     Exercises  - Supine Gluteus Stretch  - 1 x daily - 7 x weekly - 5 reps - 1 min hold  - Supine Piriformis Stretch with Foot on Ground  - 1 x daily - 7 x weekly - 5 reps - 1 min hold  - Clam  - 1 x daily - 7 x weekly - 3 sets - 10 reps - 5 hold    Therapeutic Treatments and Modalities:     2. Neuromuscular Re-education (CPT 85871)    Therapeutic Treatment and Modalities Summary: To improve use of ankle strategy utilized wobble board BUE support weight shifts AP/and ML with mod cues to dec translation of body and isolate ankles Pt demod and reported high fear performing and sig difficulty with any hip/ankle dissociation even with max of therapist blocking x 5 min,  After 1 min of M/L pt expressed sig fear and requested to stop the intervention stating too afraid to cont with this intervention and overall fatigue. SPV throughout    Time-based treatments/modalities:    Physical Therapy Timed Treatment Charges  Neuromusc re-ed, balance, coor, post minutes (CPT 18020): 10 minutes  Therapeutic exercise minutes (CPT 32381): 30 minutes          ASSESSMENT:   Response to treatment:   Provided handout for step ups with emphasis on Rt LE concentric and eccentric lowering 3 x 5 reps. Performed therEx first before balance intervention to facilitate fatigue and performance when tired. No sig LOB during interventions.Session limited by fear today.  PLAN/RECOMMENDATIONS: balance HEP  Plan for treatment: therapy treatment to continue next visit.  Planned interventions for next visit: continue with current treatment.

## 2024-04-19 ENCOUNTER — PATIENT MESSAGE (OUTPATIENT)
Dept: MEDICAL GROUP | Facility: PHYSICIAN GROUP | Age: 74
End: 2024-04-19
Payer: MEDICARE

## 2024-04-19 DIAGNOSIS — Z79.01 CHRONIC ANTICOAGULATION: ICD-10-CM

## 2024-04-19 DIAGNOSIS — I10 ESSENTIAL HYPERTENSION: ICD-10-CM

## 2024-04-19 DIAGNOSIS — I48.0 PAROXYSMAL ATRIAL FIBRILLATION (HCC): ICD-10-CM

## 2024-04-19 DIAGNOSIS — E78.5 DYSLIPIDEMIA: ICD-10-CM

## 2024-04-22 NOTE — PATIENT COMMUNICATION
Received request via: Patient    Was the patient seen in the last year in this department? Yes    Does the patient have an active prescription (recently filled or refills available) for medication(s) requested? No    [facility]    Does the patient have half-way Plus and need 100 day supply (blood pressure, diabetes and cholesterol meds only)? Patient does not have SCP

## 2024-04-24 RX ORDER — ATORVASTATIN CALCIUM 10 MG/1
10 TABLET, FILM COATED ORAL NIGHTLY
Qty: 90 TABLET | Refills: 0 | Status: SHIPPED | OUTPATIENT
Start: 2024-04-24

## 2024-04-24 RX ORDER — DILTIAZEM HYDROCHLORIDE 120 MG/1
120 TABLET, FILM COATED ORAL 2 TIMES DAILY
Qty: 180 TABLET | Refills: 0 | Status: SHIPPED | OUTPATIENT
Start: 2024-04-24

## 2024-04-24 RX ORDER — CLONIDINE HYDROCHLORIDE 0.1 MG/1
0.1 TABLET ORAL 2 TIMES DAILY
Qty: 180 TABLET | Refills: 0 | Status: SHIPPED | OUTPATIENT
Start: 2024-04-24

## 2024-04-24 RX ORDER — CHLORTHALIDONE 25 MG/1
25 TABLET ORAL EVERY MORNING
Qty: 90 TABLET | Refills: 0 | Status: SHIPPED | OUTPATIENT
Start: 2024-04-24

## 2024-04-24 RX ORDER — CARVEDILOL 25 MG/1
50 TABLET ORAL 2 TIMES DAILY
Qty: 360 TABLET | Refills: 0 | Status: SHIPPED | OUTPATIENT
Start: 2024-04-24

## 2024-04-24 RX ORDER — WARFARIN SODIUM 5 MG/1
TABLET ORAL
Qty: 180 TABLET | Refills: 0 | Status: SHIPPED | OUTPATIENT
Start: 2024-04-24

## 2024-04-24 RX ORDER — LOSARTAN POTASSIUM 100 MG/1
100 TABLET ORAL
Qty: 90 TABLET | Refills: 0 | Status: SHIPPED | OUTPATIENT
Start: 2024-04-24

## 2024-04-24 NOTE — TELEPHONE ENCOUNTER
Requested Prescriptions     Pending Prescriptions Disp Refills    atorvastatin (LIPITOR) 10 MG Tab 90 Tablet 0     Sig: Take 1 Tablet by mouth every evening.    carvedilol (COREG) 25 MG Tab 360 Tablet 0     Sig: Take 2 Tablets by mouth 2 times a day.    chlorthalidone (HYGROTON) 25 MG Tab 90 Tablet 0     Sig: Take 1 Tablet by mouth every morning.    cloNIDine (CATAPRES) 0.1 MG Tab 180 Tablet 0     Sig: Take 1 Tablet by mouth 2 times a day.    dilTIAZem (CARDIZEM) 120 MG Tab 180 Tablet 0     Sig: Take 1 Tablet by mouth 2 times a day.    losartan (COZAAR) 100 MG Tab 90 Tablet 0     Sig: Take 1 Tablet by mouth every day.    warfarin (COUMADIN) 5 MG Tab 180 Tablet 0     Sig: Follow directions per anticoagulation clinic. 5 mg (5 mg x 1) every Wed; 10 mg (5 mg x 2) all other days     Ting Salcedo M.D.

## 2024-04-25 ENCOUNTER — APPOINTMENT (OUTPATIENT)
Dept: PHYSICAL THERAPY | Facility: REHABILITATION | Age: 74
End: 2024-04-25
Attending: FAMILY MEDICINE
Payer: MEDICARE

## 2024-04-26 ENCOUNTER — ANTICOAGULATION MONITORING (OUTPATIENT)
Dept: VASCULAR LAB | Facility: MEDICAL CENTER | Age: 74
End: 2024-04-26
Payer: MEDICARE

## 2024-04-26 DIAGNOSIS — I48.0 PAROXYSMAL ATRIAL FIBRILLATION (HCC): ICD-10-CM

## 2024-04-26 DIAGNOSIS — Z86.73 HISTORY OF ISCHEMIC STROKE: ICD-10-CM

## 2024-04-26 DIAGNOSIS — Z79.01 LONG TERM (CURRENT) USE OF ANTICOAGULANTS: ICD-10-CM

## 2024-04-26 LAB — INR PPP: 2.6 (ref 2–3.5)

## 2024-04-26 NOTE — PROGRESS NOTES
Anticoagulation Summary  As of 2024      INR goal:  2.0-3.0   TTR:  85.7% (3.8 y)   INR used for dosin.60 (2024)   Warfarin maintenance plan:  5 mg (5 mg x 1) every Wed, Sat; 10 mg (5 mg x 2) all other days   Weekly warfarin total:  60 mg   Plan last modified:  Renny Christina PharmD (2024)   Next INR check:  5/10/2024   Target end date:  Indefinite    Indications    Paroxysmal atrial fibrillation (HCC) [I48.0]  Long term (current) use of anticoagulants [Z79.01] [Z79.01]  History of ischemic stroke [Z86.73]                 Anticoagulation Episode Summary       INR check location:  Anticoagulation Clinic    Preferred lab:      Send INR reminders to:      Comments:    Only call if out of range          Anticoagulation Care Providers       Provider Role Specialty Phone number    MILO Padilla  Nurse Practitioner Family [phone]            Refer to Anticoagulation Patient Findings for HPI    INR therapeutic at 2.6.      Per chart review pt prefers no phone call if INR in range.    Pt to continue with current warfarin dosing regimen.     Will follow up in 2 week(s).     Renny Christina, PharmD, BCACP

## 2024-05-10 ENCOUNTER — DOCUMENTATION (OUTPATIENT)
Dept: HEALTH INFORMATION MANAGEMENT | Facility: OTHER | Age: 74
End: 2024-05-10
Payer: MEDICARE

## 2024-05-10 LAB — INR PPP: 1.5 (ref 2–3.5)

## 2024-05-13 ENCOUNTER — ANTICOAGULATION MONITORING (OUTPATIENT)
Dept: VASCULAR LAB | Facility: MEDICAL CENTER | Age: 74
End: 2024-05-13
Payer: MEDICARE

## 2024-05-13 DIAGNOSIS — I48.0 PAROXYSMAL ATRIAL FIBRILLATION (HCC): ICD-10-CM

## 2024-05-13 DIAGNOSIS — Z86.73 HISTORY OF ISCHEMIC STROKE: ICD-10-CM

## 2024-05-13 DIAGNOSIS — Z79.01 LONG TERM (CURRENT) USE OF ANTICOAGULANTS: ICD-10-CM

## 2024-05-13 NOTE — PROGRESS NOTES
Anticoagulation Summary  As of 2024      INR goal:  2.0-3.0   TTR:  85.4% (3.8 y)   INR used for dosin.50 (5/10/2024)   Warfarin maintenance plan:  5 mg (5 mg x 1) every Wed, Sat; 10 mg (5 mg x 2) all other days   Weekly warfarin total:  60 mg   Plan last modified:  Idris RodriguezD (2024)   Next INR check:  2024   Target end date:  Indefinite    Indications    Paroxysmal atrial fibrillation (HCC) [I48.0]  Long term (current) use of anticoagulants [Z79.01] [Z79.01]  History of ischemic stroke [Z86.73]                 Anticoagulation Episode Summary       INR check location:  Anticoagulation Clinic    Preferred lab:      Send INR reminders to:      Comments:    Only call if out of range          Anticoagulation Care Providers       Provider Role Specialty Phone number    MILO Padilla  Nurse Practitioner Family [phone]          Anticoagulation Patient Findings  Patient Findings       Positives:  Missed doses, Extra doses (took bolus dose on Sat)    Negatives:  Signs/symptoms of thrombosis, Signs/symptoms of bleeding, Laboratory test error suspected, Change in health, Change in alcohol use, Change in activity, Upcoming invasive procedure, Emergency department visit, Upcoming dental procedure, Change in medications, Change in diet/appetite, Hospital admission, Bruising, Other complaints            Called and spoke to patient's .    INR subtherapeutic due to missed dose. Patient already made up for this.    Warfarin Plan: Continue regimen as listed above.    Next INR in 2 week(s).    Judi Davidson, IdrisD, BCACP

## 2024-05-23 ENCOUNTER — PHYSICAL THERAPY (OUTPATIENT)
Dept: PHYSICAL THERAPY | Facility: REHABILITATION | Age: 74
End: 2024-05-23
Attending: FAMILY MEDICINE
Payer: MEDICARE

## 2024-05-23 DIAGNOSIS — Z86.73 HISTORY OF ISCHEMIC STROKE: ICD-10-CM

## 2024-05-23 DIAGNOSIS — R26.89 BALANCE PROBLEM: ICD-10-CM

## 2024-05-23 NOTE — OP THERAPY DISCHARGE SUMMARY
Outpatient Physical Therapy  DISCHARGE SUMMARY NOTE      [place] [address] [address]  [address]  [place] [address]  Phone:  [phone]  Fax:  [phone]    Date of Visit: 05/23/2024    Patient: Tonya Del Valle  YOB: 1950  MRN: [id number]     Referring Provider: Tcuker Browning M.D.  [facility] [facility] [address]  [place] [address]   Referring Diagnosis Other abnormalities of gait and mobility [R26.89]         Functional Assessment Used        Your patient is being discharged from Physical Therapy with the following comments:   Progress plateau    Comments:  Patient has participated in 7 physical therapy sessions to address impaired balance, endurance, and gait mechanics secondary to chronic CVA. Since the last progress note, no changes in outcome measures were noted. Encouraged patient to continue with HEP and implement daily walking routine to continue to challenge strength and endurance and promote neuroplasticity. Patient will be discharged today with a HEP but was encouraged to return if there is a change in function.      Progress Toward Goals:  Short Term Goals:   Pt will be compliant with HEP 3-5x per week for improving strength and stabilty. PARTIALLY MET  Pt will complete gait trial with SPC. NOT MET, worked with SBQC  Pt will complete most appropriate balance outcome measure. MET      Long Term Goals:    1. Pt will demonstrate improved functional mobility with TUG score 15 sec or better. NOT MET  2. Pt will demonstrate improved functional LE strength with 5STS 13 sec or better. NOT MET  3. Pt will demonstrate improved CV endurance and gait speed with 6MWT score 650 feet or better. NOT MET  4. Pt will demonstrate improved balance scoring low fall risk on most appropriate outcome measure. NOT MET  5. Pt will demonstrate improved gait speed with 10MWT with 4WW of .8m/s or better to dec fall risk. NOT MET  6. Pt will ambulate household distances with  SPC mod I for inc time. NOT MET  7. Pt will complete outdoor gait trial with SPC if appropriate to increase ability to access community with LRAD. NOT MET      Naila Bliss, PT, DPT    Date: 5/23/2024

## 2024-05-23 NOTE — OP THERAPY DAILY TREATMENT
"  Outpatient Physical Therapy  DAILY TREATMENT     [facility] [address].  [address]  [place] [address]  Phone:  [phone]  Fax:  [phone]    Date: 05/23/2024    Patient: Tonya Del Valle  YOB: 1950  MRN: [id number]     Time Calculation    Start time: 1000  Stop time: 1048 Time Calculation (min): 48 minutes         Chief Complaint: Difficulty Walking    Visit #: 7    SUBJECTIVE:  \"Eulalia\" reports that she has been hesitant to work on walking with SBQC at home because she knows that she cannot perform step through pattern as instructed by previous PT. She has not been consistent with strengthening HEP because she feels like it is not carryover to function. Overall she feels confident with 4WW but knows she needs to work on endurance.     OBJECTIVE:  Current objective measures:   TUG 4WW: 22.31 seconds (decreased from 17.89 seconds)   TUG SBQC unwilling to try today     5STS BUE 17.12 seconds (unable no UE)  (decreased from 14.17 seconds with BUEs)    6MWT 497 feet 4WW  with progressively worsening R foot drag. No rest breaks. (decreased from 550 feet with this device)    MURPHY  1. Sit to stand  4. Able to stand without using hands and stabilize independently   3. Able to stand independently using hands  2. Able to stand using hands after several tries  1. Needs min A to stand or stabilize  0. Needs mod or max A to stand     2. Stand unsupported  4. Able to stand safely for 2 min  3. Able to stand 2 min with spv  2. Able to stand 30 sec unsupported  1. Needs several tries to stand unsupported  0. Unable to stand 30 sec unsupported     3. Sitting with back unsupported  4. Able to sit safely and securely for 2 min  3. Able to sit 2 min SPV  2. Able to sit 30 sec  1. Able to sit 10 sec  0. Unable to sit without support x 10 sec     4. Stand to sit  4. Sits safely min use of hands  3. Controls descent by using hands  2. Uses back of legs against chair to control " descent  1. Sits independently but uncontrolled descent  0. Needs assist to sit        5. Transfers  4.Able to transfer safely minor use of hands  3.Able to transfer safely definite use of hands  2. Able to transfer with verbal cueing or spv  1. Needs 1 person to assist  0. Needs two people to assist or spv for safety     6. Stand unsupported EC  4. Able to stand 10 sec safely  3. Able to stand 10 sec with spv  2. Able to stand 3 sec  1. Unable to keep EC 3 sec but stays safely  0. Needs help to keep from falling     7. Standing unsupported narrow OBDULIO  4. Able to place feet independently and stand 1 min safely  3.Able to place feet independently and stand 1 min with SPV  2. Able to place feet together independently but unable to hold 30 sec  1. Needs help to attain position but able to stand 15 seconds feet together  0. Needs help to attain position but unable to stand 15 seconds feet together     8. Reaching forward outstretched arm  4. Can reach forward confidently 25 cm (10 in)  3. Can reach forward 12cm (5 in)  2. Can reach forward 5 cm ( 2 in)  1. Reaches forward but needs spv  0. Loses balance tony trying/requires external support     9.  object from floor in standing  4. Able to  slipper safely and easily  3. Able to  slipper but needs spv  2. Unable to  but reaches 2-5cm (1-2in) from slipper and keeps balance independently   1. Unable to  and needs spv to try  0. Unable to try, needs assist to avoid fall or loss of balance     10. Turn to look behind over left and right shoulders  4. Looks behind from both sides and weight shifts well  3. Looks behind one side only other side shows less weight shift  2. Turns sideways only but maintains balance  1. Needs SPV while turning  0. Needs assistance when turning     11. Turn 360 degrees  4. Able to turn 360 safely in 4 sec or less  3. Able to turn 360 one side only safely in 4 sec or less  2. Able to turn 360 safely but slowly  1.  Needs close SPV or cueing  0. Needs assistance while turning        12. Place alternating LE on stool while standing unsupported  4. Able to stand independently and safely and complete 8 steps in 20 sec  3. Able to stand independently and safely and complete 8 steps in > 20 sec  2. Able to complete 4 steps without aid with SPV  1. Able to complete >2 steps min A  0. Needs assistance to keep from falling unable to try.     13. Standing Tandem unsupported  4. Able to place foot independently and hold for 30 sec  3. Able to place foot ahead independently and hold for 30 sec  2. Able to take small step independently and hold 30 sec  1. Needs help to step but can hold 15 sec  0. Loses balance while stepping or standing     14. Single limb  4. Able to lift leg independently and hold > 10 sec  3.Able to lift leg independently and hold  5-10 sec  2. Able to lift leg independently and hold >/equal to 3 sec  1. Tries to lift leg unable to hold 3 sec but remains standing independently   0. Unable to try or needs assist to prevent fall      30/56 (21-40 indicates moderate risk for falling.           Therapeutic Exercises (CPT 55185):     1. Re-assessment of 5XSTS, 6MWT, and goals, See above for details    2. Reviewed HEP    20. 4/11-6/6      Therapeutic Exercise Summary:  HEP: STS 1 UE up, no UE down 3 x10, ankle DF 3 x 10, x 10 sec iso, Step ups 3 x 5, BUE Rt LE    Access Code: [email or web address]  URL: [email or web address]  Date: 02/22/2024  Prepared by:     Exercises  - Supine Gluteus Stretch  - 1 x daily - 7 x weekly - 5 reps - 1 min hold  - Supine Piriformis Stretch with Foot on Ground  - 1 x daily - 7 x weekly - 5 reps - 1 min hold  - Clam  - 1 x daily - 7 x weekly - 3 sets - 10 reps - 5 hold    Therapeutic Treatments and Modalities:     1. Neuromuscular Re-education (CPT 40177), Re-assessment of 10MWT, TUG, MURPHY, Discussed discharge but need to implement daily walking routine    Time-based treatments/modalities:    Physical  Therapy Timed Treatment Charges  Neuromusc re-ed, balance, coor, post minutes (CPT 18947): 33 minutes  Therapeutic exercise minutes (CPT 27150): 15 minutes      ASSESSMENT:   Response to treatment: Patient has participated in 7 physical therapy sessions to address impaired balance, endurance, and gait mechanics secondary to chronic CVA. Since the last progress note, no changes in outcome measures were noted. Encouraged patient to continue with HEP and implement daily walking routine to continue to challenge strength and endurance and promote neuroplasticity. Patient will be discharged today with a HEP but was encouraged to return if there is a change in function.       PLAN/RECOMMENDATIONS:   Plan for treatment:  Discharge due to plateau in function .  Planned interventions for next visit:  N/A .

## 2024-05-24 ENCOUNTER — ANTICOAGULATION MONITORING (OUTPATIENT)
Dept: MEDICAL GROUP | Facility: PHYSICIAN GROUP | Age: 74
End: 2024-05-24
Payer: MEDICARE

## 2024-05-24 DIAGNOSIS — Z86.73 HISTORY OF ISCHEMIC STROKE: ICD-10-CM

## 2024-05-24 DIAGNOSIS — I48.0 PAROXYSMAL ATRIAL FIBRILLATION (HCC): ICD-10-CM

## 2024-05-24 DIAGNOSIS — Z79.01 LONG TERM (CURRENT) USE OF ANTICOAGULANTS: ICD-10-CM

## 2024-05-24 LAB — INR PPP: 2.5 (ref 2–3.5)

## 2024-05-24 NOTE — PROGRESS NOTES
Anticoagulation Summary  As of 2024      INR goal:  2.0-3.0   TTR:  85.0% (3.8 y)   INR used for dosin.50 (2024)   Warfarin maintenance plan:  5 mg (5 mg x 1) every Wed, Sat; 10 mg (5 mg x 2) all other days   Weekly warfarin total:  60 mg   Plan last modified:  Renny Christina PharmD (2024)   Next INR check:  2024   Target end date:  Indefinite    Indications    Paroxysmal atrial fibrillation (HCC) [I48.0]  Long term (current) use of anticoagulants [Z79.01] [Z79.01]  History of ischemic stroke [Z86.73]                 Anticoagulation Episode Summary       INR check location:  Anticoagulation Clinic    Preferred lab:      Send INR reminders to:      Comments:    Only call if out of range          Anticoagulation Care Providers       Provider Role Specialty Phone number    MILO Padilla  Nurse Practitioner Family [phone]          Anticoagulation Patient Findings    INR therapeutic at 2.5.  Per chart notes, patient requests contact only when out of range.  Pt is to continue with current warfarin dosing regimen.  Follow up in 2 weeks, to reduce risk of adverse events related to this high risk medication,  Warfarin.    Nestor Burch, PharmD, BCACP

## 2024-05-31 ENCOUNTER — TELEPHONE (OUTPATIENT)
Dept: MEDICAL GROUP | Facility: PHYSICIAN GROUP | Age: 74
End: 2024-05-31

## 2024-05-31 NOTE — TELEPHONE ENCOUNTER
Called PT to R/S No Show appointment. Spoke with  who said she was not available but would call back to R/S.

## 2024-06-07 ENCOUNTER — ANTICOAGULATION MONITORING (OUTPATIENT)
Dept: VASCULAR LAB | Facility: MEDICAL CENTER | Age: 74
End: 2024-06-07
Payer: MEDICARE

## 2024-06-07 DIAGNOSIS — I48.0 PAROXYSMAL ATRIAL FIBRILLATION (HCC): ICD-10-CM

## 2024-06-07 DIAGNOSIS — Z86.73 HISTORY OF ISCHEMIC STROKE: ICD-10-CM

## 2024-06-07 DIAGNOSIS — Z79.01 LONG TERM (CURRENT) USE OF ANTICOAGULANTS: ICD-10-CM

## 2024-06-07 LAB — INR PPP: 2.1 (ref 2–3.5)

## 2024-06-07 NOTE — PROGRESS NOTES
Anticoagulation Summary  As of 2024      INR goal:  2.0-3.0   TTR:  85.2% (3.9 y)   INR used for dosin.10 (2024)   Warfarin maintenance plan:  5 mg (5 mg x 1) every Wed, Sat; 10 mg (5 mg x 2) all other days   Weekly warfarin total:  60 mg   Plan last modified:  Renny Christina PharmD (2024)   Next INR check:  2024   Target end date:  Indefinite    Indications    Paroxysmal atrial fibrillation (HCC) [I48.0]  Long term (current) use of anticoagulants [Z79.01] [Z79.01]  History of ischemic stroke [Z86.73]                 Anticoagulation Episode Summary       INR check location:  Anticoagulation Clinic    Preferred lab:      Send INR reminders to:      Comments:    Only call if out of range          Anticoagulation Care Providers       Provider Role Specialty Phone number    MILO Padilla  Nurse Practitioner Family [phone]          Anticoagulation Patient Findings          INR therapeutic at 2.1.  Per chart notes, patient requests contact only when out of range.  Pt is to continue with current warfarin dosing regimen.  Follow up in 2 weeks, to reduce risk of adverse events related to this high risk medication,  Warfarin.     Nestor Burch, PharmD, BCACP

## 2024-06-21 LAB — INR PPP: 2.2 (ref 2–3.5)

## 2024-06-24 ENCOUNTER — ANTICOAGULATION MONITORING (OUTPATIENT)
Dept: VASCULAR LAB | Facility: MEDICAL CENTER | Age: 74
End: 2024-06-24
Payer: MEDICARE

## 2024-06-24 DIAGNOSIS — I48.0 PAROXYSMAL ATRIAL FIBRILLATION (HCC): ICD-10-CM

## 2024-06-24 DIAGNOSIS — Z86.73 HISTORY OF ISCHEMIC STROKE: ICD-10-CM

## 2024-06-24 DIAGNOSIS — Z79.01 LONG TERM (CURRENT) USE OF ANTICOAGULANTS: ICD-10-CM

## 2024-06-24 NOTE — PROGRESS NOTES
Anticoagulation Summary  As of 2024      INR goal:  2.0-3.0   TTR:  85.3% (3.9 y)   INR used for dosin.20 (2024)   Warfarin maintenance plan:  5 mg (5 mg x 1) every Wed, Sat; 10 mg (5 mg x 2) all other days   Weekly warfarin total:  60 mg   Plan last modified:  Renny Christina PharmD (2024)   Next INR check:  2024   Target end date:  Indefinite    Indications    Paroxysmal atrial fibrillation (HCC) [I48.0]  Long term (current) use of anticoagulants [Z79.01] [Z79.01]  History of ischemic stroke [Z86.73]                 Anticoagulation Episode Summary       INR check location:  Anticoagulation Clinic    Preferred lab:      Send INR reminders to:      Comments:    Only call if out of range          Anticoagulation Care Providers       Provider Role Specialty Phone number    MILO Padilla  Nurse Practitioner Family [phone]          Anticoagulation Patient Findings      Per chart review, pt does not want to be contacted if INR is therapeutic     INR therapeutic    Unless patient reports any changes that would warrant an adjustment to the plan:  Warfarin Plan: Continue regimen as listed above.    Next INR in 2 week(s).    Judi Davidson, IdrisD, BCACP

## 2024-07-04 ENCOUNTER — HOSPITAL ENCOUNTER (OUTPATIENT)
Dept: RADIOLOGY | Facility: MEDICAL CENTER | Age: 74
End: 2024-07-04
Payer: MEDICARE

## 2024-07-04 DIAGNOSIS — Z13.820 ENCOUNTER FOR OSTEOPOROSIS SCREENING IN ASYMPTOMATIC POSTMENOPAUSAL PATIENT: ICD-10-CM

## 2024-07-04 DIAGNOSIS — Z78.0 ENCOUNTER FOR OSTEOPOROSIS SCREENING IN ASYMPTOMATIC POSTMENOPAUSAL PATIENT: ICD-10-CM

## 2024-07-04 PROCEDURE — 77080 DXA BONE DENSITY AXIAL: CPT

## 2024-07-05 LAB — INR PPP: 1.7 (ref 2–3.5)

## 2024-07-08 ENCOUNTER — ANTICOAGULATION MONITORING (OUTPATIENT)
Dept: VASCULAR LAB | Facility: MEDICAL CENTER | Age: 74
End: 2024-07-08
Payer: MEDICARE

## 2024-07-08 DIAGNOSIS — Z86.73 HISTORY OF ISCHEMIC STROKE: ICD-10-CM

## 2024-07-08 DIAGNOSIS — I48.0 PAROXYSMAL ATRIAL FIBRILLATION (HCC): ICD-10-CM

## 2024-07-08 DIAGNOSIS — Z79.01 LONG TERM (CURRENT) USE OF ANTICOAGULANTS: ICD-10-CM

## 2024-07-13 LAB — INR PPP: 1.9 (ref 2–3.5)

## 2024-07-15 ENCOUNTER — TELEPHONE (OUTPATIENT)
Dept: CARDIOLOGY | Facility: MEDICAL CENTER | Age: 74
End: 2024-07-15
Payer: MEDICARE

## 2024-07-15 ENCOUNTER — ANTICOAGULATION MONITORING (OUTPATIENT)
Dept: VASCULAR LAB | Facility: MEDICAL CENTER | Age: 74
End: 2024-07-15
Payer: MEDICARE

## 2024-07-15 DIAGNOSIS — Z79.01 LONG TERM (CURRENT) USE OF ANTICOAGULANTS: ICD-10-CM

## 2024-07-15 DIAGNOSIS — Z86.73 HISTORY OF ISCHEMIC STROKE: ICD-10-CM

## 2024-07-15 DIAGNOSIS — I10 ESSENTIAL HYPERTENSION: ICD-10-CM

## 2024-07-15 DIAGNOSIS — I48.0 PAROXYSMAL ATRIAL FIBRILLATION (HCC): ICD-10-CM

## 2024-07-15 RX ORDER — CARVEDILOL 25 MG/1
50 TABLET ORAL 2 TIMES DAILY
Qty: 360 TABLET | Refills: 0 | Status: SHIPPED | OUTPATIENT
Start: 2024-07-15

## 2024-07-19 ENCOUNTER — ANTICOAGULATION MONITORING (OUTPATIENT)
Dept: MEDICAL GROUP | Facility: PHYSICIAN GROUP | Age: 74
End: 2024-07-19
Payer: MEDICARE

## 2024-07-19 DIAGNOSIS — I48.0 PAROXYSMAL ATRIAL FIBRILLATION (HCC): ICD-10-CM

## 2024-07-19 DIAGNOSIS — Z86.73 HISTORY OF ISCHEMIC STROKE: ICD-10-CM

## 2024-07-19 DIAGNOSIS — Z79.01 LONG TERM (CURRENT) USE OF ANTICOAGULANTS: ICD-10-CM

## 2024-07-19 LAB — INR PPP: 2.2 (ref 2–3.5)

## 2024-08-07 ENCOUNTER — ANTICOAGULATION MONITORING (OUTPATIENT)
Dept: VASCULAR LAB | Facility: MEDICAL CENTER | Age: 74
End: 2024-08-07
Payer: MEDICARE

## 2024-08-07 DIAGNOSIS — Z86.73 HISTORY OF ISCHEMIC STROKE: ICD-10-CM

## 2024-08-07 DIAGNOSIS — Z79.01 LONG TERM (CURRENT) USE OF ANTICOAGULANTS: ICD-10-CM

## 2024-08-07 DIAGNOSIS — I48.0 PAROXYSMAL ATRIAL FIBRILLATION (HCC): ICD-10-CM

## 2024-08-07 LAB — INR PPP: 2.1 (ref 2–3.5)

## 2024-08-07 NOTE — PROGRESS NOTES
Anticoagulation Summary  As of 2024      INR goal:  2.0-3.0   TTR:  84.6% (4.1 y)   INR used for dosin.10 (2024)   Warfarin maintenance plan:  5 mg (5 mg x 1) every Sat; 10 mg (5 mg x 2) all other days   Weekly warfarin total:  65 mg   Plan last modified:  Hyacinth Bliss AKimmyP.NKimmy (7/15/2024)   Next INR check:  2024   Target end date:  Indefinite    Indications    Paroxysmal atrial fibrillation (HCC) [I48.0]  Long term (current) use of anticoagulants [Z79.01] [Z79.01]  History of ischemic stroke [Z86.73]                 Anticoagulation Episode Summary       INR check location:  Anticoagulation Clinic    Preferred lab:      Send INR reminders to:      Comments:    Only call if out of range          Anticoagulation Care Providers       Provider Role Specialty Phone number    NATALI PadillaRKimmyN.  Nurse Practitioner Family [phone]          Anticoagulation Patient Findings      Pt reported a therapeutic INR  Per chart review, pt does not want to be contacted if INR is therapeutic   Pt to continue with current warfarin dosing regimen. Requested pt contact the clinic for any s/s of unusual bleeding, bruising, clotting or any changes to diet or medication.    FU INR in 2 week(s).    Jolanta Barnett, PharmD

## 2024-08-08 DIAGNOSIS — Z79.01 CHRONIC ANTICOAGULATION: ICD-10-CM

## 2024-08-08 DIAGNOSIS — I10 ESSENTIAL HYPERTENSION: ICD-10-CM

## 2024-08-08 DIAGNOSIS — I48.0 PAROXYSMAL ATRIAL FIBRILLATION (HCC): ICD-10-CM

## 2024-08-08 DIAGNOSIS — E78.5 DYSLIPIDEMIA: ICD-10-CM

## 2024-08-08 RX ORDER — WARFARIN SODIUM 5 MG/1
TABLET ORAL
Qty: 180 TABLET | Refills: 0 | Status: SHIPPED | OUTPATIENT
Start: 2024-08-08

## 2024-08-08 RX ORDER — CHLORTHALIDONE 25 MG/1
25 TABLET ORAL EVERY MORNING
Qty: 90 TABLET | Refills: 0 | Status: SHIPPED | OUTPATIENT
Start: 2024-08-08

## 2024-08-08 RX ORDER — ATORVASTATIN CALCIUM 10 MG/1
10 TABLET, FILM COATED ORAL NIGHTLY
Qty: 90 TABLET | Refills: 0 | Status: SHIPPED | OUTPATIENT
Start: 2024-08-08

## 2024-08-08 RX ORDER — DILTIAZEM HYDROCHLORIDE 120 MG/1
120 TABLET, FILM COATED ORAL 2 TIMES DAILY
Qty: 60 TABLET | Refills: 0 | Status: SHIPPED | OUTPATIENT
Start: 2024-08-08

## 2024-08-08 RX ORDER — LOSARTAN POTASSIUM 100 MG/1
100 TABLET ORAL
Qty: 90 TABLET | Refills: 0 | Status: SHIPPED | OUTPATIENT
Start: 2024-08-08

## 2024-08-08 RX ORDER — DILTIAZEM HYDROCHLORIDE 120 MG/1
120 TABLET, FILM COATED ORAL 2 TIMES DAILY
Qty: 180 TABLET | Refills: 0 | Status: SHIPPED | OUTPATIENT
Start: 2024-08-08 | End: 2024-08-08 | Stop reason: SDUPTHER

## 2024-08-08 RX ORDER — CLONIDINE HYDROCHLORIDE 0.1 MG/1
0.1 TABLET ORAL 2 TIMES DAILY
Qty: 180 TABLET | Refills: 0 | Status: SHIPPED | OUTPATIENT
Start: 2024-08-08

## 2024-08-17 ENCOUNTER — HOSPITAL ENCOUNTER (OUTPATIENT)
Dept: LAB | Facility: MEDICAL CENTER | Age: 74
End: 2024-08-17
Attending: FAMILY MEDICINE
Payer: MEDICARE

## 2024-08-17 DIAGNOSIS — R73.03 PREDIABETES: ICD-10-CM

## 2024-08-17 DIAGNOSIS — I10 ESSENTIAL HYPERTENSION: ICD-10-CM

## 2024-08-17 DIAGNOSIS — E78.2 MIXED HYPERLIPIDEMIA: ICD-10-CM

## 2024-08-17 DIAGNOSIS — I48.0 PAROXYSMAL ATRIAL FIBRILLATION (HCC): ICD-10-CM

## 2024-08-17 LAB
ALBUMIN SERPL BCP-MCNC: 4.2 G/DL (ref 3.2–4.9)
ALBUMIN/GLOB SERPL: 1.6 G/DL
ALP SERPL-CCNC: 60 U/L (ref 30–99)
ALT SERPL-CCNC: 18 U/L (ref 2–50)
ANION GAP SERPL CALC-SCNC: 12 MMOL/L (ref 7–16)
AST SERPL-CCNC: 22 U/L (ref 12–45)
BASOPHILS # BLD AUTO: 1 % (ref 0–1.8)
BASOPHILS # BLD: 0.05 K/UL (ref 0–0.12)
BILIRUB SERPL-MCNC: 0.4 MG/DL (ref 0.1–1.5)
BUN SERPL-MCNC: 29 MG/DL (ref 8–22)
CALCIUM ALBUM COR SERPL-MCNC: 9.6 MG/DL (ref 8.5–10.5)
CALCIUM SERPL-MCNC: 9.8 MG/DL (ref 8.5–10.5)
CHLORIDE SERPL-SCNC: 104 MMOL/L (ref 96–112)
CHOLEST SERPL-MCNC: 156 MG/DL (ref 100–199)
CO2 SERPL-SCNC: 25 MMOL/L (ref 20–33)
CREAT SERPL-MCNC: 0.75 MG/DL (ref 0.5–1.4)
EOSINOPHIL # BLD AUTO: 0.21 K/UL (ref 0–0.51)
EOSINOPHIL NFR BLD: 4 % (ref 0–6.9)
ERYTHROCYTE [DISTWIDTH] IN BLOOD BY AUTOMATED COUNT: 49.9 FL (ref 35.9–50)
EST. AVERAGE GLUCOSE BLD GHB EST-MCNC: 120 MG/DL
FASTING STATUS PATIENT QL REPORTED: NORMAL
GFR SERPLBLD CREATININE-BSD FMLA CKD-EPI: 83 ML/MIN/1.73 M 2
GLOBULIN SER CALC-MCNC: 2.6 G/DL (ref 1.9–3.5)
GLUCOSE SERPL-MCNC: 90 MG/DL (ref 65–99)
HBA1C MFR BLD: 5.8 % (ref 4–5.6)
HCT VFR BLD AUTO: 48.6 % (ref 37–47)
HDLC SERPL-MCNC: 52 MG/DL
HGB BLD-MCNC: 15.8 G/DL (ref 12–16)
IMM GRANULOCYTES # BLD AUTO: 0.01 K/UL (ref 0–0.11)
IMM GRANULOCYTES NFR BLD AUTO: 0.2 % (ref 0–0.9)
LDLC SERPL CALC-MCNC: 87 MG/DL
LYMPHOCYTES # BLD AUTO: 1.86 K/UL (ref 1–4.8)
LYMPHOCYTES NFR BLD: 35.4 % (ref 22–41)
MCH RBC QN AUTO: 30.3 PG (ref 27–33)
MCHC RBC AUTO-ENTMCNC: 32.5 G/DL (ref 32.2–35.5)
MCV RBC AUTO: 93.1 FL (ref 81.4–97.8)
MONOCYTES # BLD AUTO: 0.42 K/UL (ref 0–0.85)
MONOCYTES NFR BLD AUTO: 8 % (ref 0–13.4)
NEUTROPHILS # BLD AUTO: 2.71 K/UL (ref 1.82–7.42)
NEUTROPHILS NFR BLD: 51.4 % (ref 44–72)
NRBC # BLD AUTO: 0 K/UL
NRBC BLD-RTO: 0 /100 WBC (ref 0–0.2)
PLATELET # BLD AUTO: 216 K/UL (ref 164–446)
PMV BLD AUTO: 10.8 FL (ref 9–12.9)
POTASSIUM SERPL-SCNC: 3.8 MMOL/L (ref 3.6–5.5)
PROT SERPL-MCNC: 6.8 G/DL (ref 6–8.2)
RBC # BLD AUTO: 5.22 M/UL (ref 4.2–5.4)
SODIUM SERPL-SCNC: 141 MMOL/L (ref 135–145)
TRIGL SERPL-MCNC: 83 MG/DL (ref 0–149)
WBC # BLD AUTO: 5.3 K/UL (ref 4.8–10.8)

## 2024-08-17 PROCEDURE — 83036 HEMOGLOBIN GLYCOSYLATED A1C: CPT | Mod: GA

## 2024-08-17 PROCEDURE — 85025 COMPLETE CBC W/AUTO DIFF WBC: CPT

## 2024-08-17 PROCEDURE — 80061 LIPID PANEL: CPT

## 2024-08-17 PROCEDURE — 36415 COLL VENOUS BLD VENIPUNCTURE: CPT

## 2024-08-17 PROCEDURE — 80053 COMPREHEN METABOLIC PANEL: CPT

## 2024-08-23 ENCOUNTER — ANTICOAGULATION MONITORING (OUTPATIENT)
Dept: VASCULAR LAB | Facility: MEDICAL CENTER | Age: 74
End: 2024-08-23
Payer: MEDICARE

## 2024-08-23 DIAGNOSIS — I48.0 PAROXYSMAL ATRIAL FIBRILLATION (HCC): ICD-10-CM

## 2024-08-23 DIAGNOSIS — Z79.01 LONG TERM (CURRENT) USE OF ANTICOAGULANTS: ICD-10-CM

## 2024-08-23 DIAGNOSIS — Z86.73 HISTORY OF ISCHEMIC STROKE: ICD-10-CM

## 2024-08-23 LAB — INR PPP: 2.6 (ref 2–3.5)

## 2024-08-23 NOTE — PROGRESS NOTES
OP   Telephone Anticoagulation Service Note      Anticoagulation Summary  As of 2024      INR goal:  2.0-3.0   TTR:  84.7% (4.1 y)   INR used for dosin.60 (2024)   Warfarin maintenance plan:  5 mg (5 mg x 1) every Sat; 10 mg (5 mg x 2) all other days   Weekly warfarin total:  65 mg   Plan last modified:  KENDRA JudgeP.NKimmy (7/15/2024)   Next INR check:  2024   Target end date:  Indefinite    Indications    Paroxysmal atrial fibrillation (HCC) [I48.0]  Long term (current) use of anticoagulants [Z79.01] [Z79.01]  History of ischemic stroke [Z86.73]                 Anticoagulation Episode Summary       INR check location:  Anticoagulation Clinic    Preferred lab:  --    Send INR reminders to:  --    Comments:  HM  Only call if out of range          Anticoagulation Care Providers       Provider Role Specialty Phone number    NATALI PadillaRKimmyN.  Nurse Practitioner Family [phone]          Anticoagulation Patient Findings    Patient prefers we call her only if her INR is out of range.   INR is therapeutic today at 2.6.  Patient is aware to call the clinic with any changes to diet or medications, with any signs/sx of bleeding or clotting or with any questions or concerns.     Patient will continue with the current dosing regimen and will follow up again in 2 weeks.     Lei SteinbergD

## 2024-09-06 ENCOUNTER — ANTICOAGULATION MONITORING (OUTPATIENT)
Dept: VASCULAR LAB | Facility: MEDICAL CENTER | Age: 74
End: 2024-09-06
Payer: MEDICARE

## 2024-09-06 DIAGNOSIS — Z79.01 LONG TERM (CURRENT) USE OF ANTICOAGULANTS: ICD-10-CM

## 2024-09-06 DIAGNOSIS — I48.0 PAROXYSMAL ATRIAL FIBRILLATION (HCC): ICD-10-CM

## 2024-09-06 DIAGNOSIS — Z86.73 HISTORY OF ISCHEMIC STROKE: ICD-10-CM

## 2024-09-06 LAB — INR PPP: 2.5 (ref 2–3.5)

## 2024-09-06 NOTE — PROGRESS NOTES
Anticoagulation Summary  As of 2024      INR goal:  2.0-3.0   TTR:  84.9% (4.1 y)   INR used for dosin.50 (2024)   Warfarin maintenance plan:  5 mg (5 mg x 1) every Sat; 10 mg (5 mg x 2) all other days   Weekly warfarin total:  65 mg   Plan last modified:  Hyacinth Bliss A.P.NKimmy (7/15/2024)   Next INR check:  2024   Target end date:  Indefinite    Indications    Paroxysmal atrial fibrillation (HCC) [I48.0]  Long term (current) use of anticoagulants [Z79.01] [Z79.01]  History of ischemic stroke [Z86.73]                 Anticoagulation Episode Summary       INR check location:  Anticoagulation Clinic    Preferred lab:  --    Send INR reminders to:  --    Comments:    Only call if out of range          Anticoagulation Care Providers       Provider Role Specialty Phone number    KRISTA Padilla.RKimmyN.  Nurse Practitioner Family [phone]          Anticoagulation Patient Findings      INR is therapeutic  Reason(s) for out of range INR today: N/A      Per chart review pt prefers no phone call if INR is within goal range.    Pt is not on antiplatelet therapy.    Warfarin Plan: Continue regimen as listed above.    Next INR in 2 week(s).    Neftali Cash, PharmD

## 2024-09-20 ENCOUNTER — ANTICOAGULATION MONITORING (OUTPATIENT)
Dept: VASCULAR LAB | Facility: MEDICAL CENTER | Age: 74
End: 2024-09-20
Payer: MEDICARE

## 2024-09-20 DIAGNOSIS — Z86.73 HISTORY OF ISCHEMIC STROKE: ICD-10-CM

## 2024-09-20 DIAGNOSIS — Z79.01 LONG TERM (CURRENT) USE OF ANTICOAGULANTS: ICD-10-CM

## 2024-09-20 DIAGNOSIS — I48.0 PAROXYSMAL ATRIAL FIBRILLATION (HCC): ICD-10-CM

## 2024-09-20 LAB — INR PPP: 2.1 (ref 2–3.5)

## 2024-09-21 NOTE — PROGRESS NOTES
Anticoagulation Summary  As of 2024      INR goal:  2.0-3.0   TTR:  85.0% (4.2 y)   INR used for dosin.10 (2024)   Warfarin maintenance plan:  5 mg (5 mg x 1) every Sat; 10 mg (5 mg x 2) all other days   Weekly warfarin total:  65 mg   Plan last modified:  Hyacinth Bliss AKimmyP.NKimmy (7/15/2024)   Next INR check:  10/4/2024   Target end date:  Indefinite    Indications    Paroxysmal atrial fibrillation (HCC) [I48.0]  Long term (current) use of anticoagulants [Z79.01] [Z79.01]  History of ischemic stroke [Z86.73]                 Anticoagulation Episode Summary       INR check location:  Anticoagulation Clinic    Preferred lab:  --    Send INR reminders to:  --    Comments:    Only call if out of range          Anticoagulation Care Providers       Provider Role Specialty Phone number    NATALI PadillaRKimmyN.  Nurse Practitioner Family [phone]            Refer to Anticoagulation Patient Findings for HPI    INR therapeutic at 2.1.      Per chart review, pt prefers no phone call if INR in range.    Pt to continue with current warfarin dosing regimen.     Will follow up in 2 week(s).     Renny Christina, PharmD, BCACP

## 2024-09-24 SDOH — ECONOMIC STABILITY: FOOD INSECURITY: WITHIN THE PAST 12 MONTHS, THE FOOD YOU BOUGHT JUST DIDN'T LAST AND YOU DIDN'T HAVE MONEY TO GET MORE.: NEVER TRUE

## 2024-09-24 SDOH — HEALTH STABILITY: PHYSICAL HEALTH: ON AVERAGE, HOW MANY MINUTES DO YOU ENGAGE IN EXERCISE AT THIS LEVEL?: 10 MIN

## 2024-09-24 SDOH — HEALTH STABILITY: PHYSICAL HEALTH: ON AVERAGE, HOW MANY DAYS PER WEEK DO YOU ENGAGE IN MODERATE TO STRENUOUS EXERCISE (LIKE A BRISK WALK)?: 1 DAY

## 2024-09-24 SDOH — ECONOMIC STABILITY: INCOME INSECURITY: IN THE LAST 12 MONTHS, WAS THERE A TIME WHEN YOU WERE NOT ABLE TO PAY THE MORTGAGE OR RENT ON TIME?: NO

## 2024-09-24 SDOH — ECONOMIC STABILITY: FOOD INSECURITY: WITHIN THE PAST 12 MONTHS, YOU WORRIED THAT YOUR FOOD WOULD RUN OUT BEFORE YOU GOT MONEY TO BUY MORE.: NEVER TRUE

## 2024-09-24 SDOH — ECONOMIC STABILITY: INCOME INSECURITY: HOW HARD IS IT FOR YOU TO PAY FOR THE VERY BASICS LIKE FOOD, HOUSING, MEDICAL CARE, AND HEATING?: NOT HARD AT ALL

## 2024-09-24 ASSESSMENT — SOCIAL DETERMINANTS OF HEALTH (SDOH)
IN THE PAST 12 MONTHS, HAS THE ELECTRIC, GAS, OIL, OR WATER COMPANY THREATENED TO SHUT OFF SERVICE IN YOUR HOME?: NO
HOW OFTEN DO YOU ATTEND CHURCH OR RELIGIOUS SERVICES?: MORE THAN 4 TIMES PER YEAR
DO YOU BELONG TO ANY CLUBS OR ORGANIZATIONS SUCH AS CHURCH GROUPS UNIONS, FRATERNAL OR ATHLETIC GROUPS, OR SCHOOL GROUPS?: NO
HOW MANY DRINKS CONTAINING ALCOHOL DO YOU HAVE ON A TYPICAL DAY WHEN YOU ARE DRINKING: PATIENT DOES NOT DRINK
HOW OFTEN DO YOU ATTEND CHURCH OR RELIGIOUS SERVICES?: MORE THAN 4 TIMES PER YEAR
HOW OFTEN DO YOU GET TOGETHER WITH FRIENDS OR RELATIVES?: ONCE A WEEK
WITHIN THE PAST 12 MONTHS, YOU WORRIED THAT YOUR FOOD WOULD RUN OUT BEFORE YOU GOT THE MONEY TO BUY MORE: NEVER TRUE
HOW OFTEN DO YOU HAVE A DRINK CONTAINING ALCOHOL: NEVER
HOW OFTEN DO YOU ATTENT MEETINGS OF THE CLUB OR ORGANIZATION YOU BELONG TO?: NEVER
DO YOU BELONG TO ANY CLUBS OR ORGANIZATIONS SUCH AS CHURCH GROUPS UNIONS, FRATERNAL OR ATHLETIC GROUPS, OR SCHOOL GROUPS?: NO
IN A TYPICAL WEEK, HOW MANY TIMES DO YOU TALK ON THE PHONE WITH FAMILY, FRIENDS, OR NEIGHBORS?: TWICE A WEEK
HOW OFTEN DO YOU GET TOGETHER WITH FRIENDS OR RELATIVES?: ONCE A WEEK
IN A TYPICAL WEEK, HOW MANY TIMES DO YOU TALK ON THE PHONE WITH FAMILY, FRIENDS, OR NEIGHBORS?: TWICE A WEEK
HOW OFTEN DO YOU ATTENT MEETINGS OF THE CLUB OR ORGANIZATION YOU BELONG TO?: NEVER
HOW HARD IS IT FOR YOU TO PAY FOR THE VERY BASICS LIKE FOOD, HOUSING, MEDICAL CARE, AND HEATING?: NOT HARD AT ALL
HOW OFTEN DO YOU HAVE SIX OR MORE DRINKS ON ONE OCCASION: NEVER

## 2024-09-24 ASSESSMENT — LIFESTYLE VARIABLES
HOW OFTEN DO YOU HAVE A DRINK CONTAINING ALCOHOL: NEVER
AUDIT-C TOTAL SCORE: 0
HOW MANY STANDARD DRINKS CONTAINING ALCOHOL DO YOU HAVE ON A TYPICAL DAY: PATIENT DOES NOT DRINK
HOW OFTEN DO YOU HAVE SIX OR MORE DRINKS ON ONE OCCASION: NEVER
SKIP TO QUESTIONS 9-10: 1

## 2024-09-26 ENCOUNTER — OFFICE VISIT (OUTPATIENT)
Dept: MEDICAL GROUP | Facility: PHYSICIAN GROUP | Age: 74
End: 2024-09-26
Payer: MEDICARE

## 2024-09-26 VITALS
SYSTOLIC BLOOD PRESSURE: 118 MMHG | WEIGHT: 191 LBS | RESPIRATION RATE: 16 BRPM | TEMPERATURE: 98.1 F | HEART RATE: 77 BPM | HEIGHT: 65 IN | BODY MASS INDEX: 31.82 KG/M2 | OXYGEN SATURATION: 97 % | DIASTOLIC BLOOD PRESSURE: 80 MMHG

## 2024-09-26 DIAGNOSIS — I10 ESSENTIAL HYPERTENSION: ICD-10-CM

## 2024-09-26 DIAGNOSIS — D68.69 HYPERCOAGULABLE STATE DUE TO PAROXYSMAL ATRIAL FIBRILLATION (HCC): ICD-10-CM

## 2024-09-26 DIAGNOSIS — R42 DISEQUILIBRIUM: ICD-10-CM

## 2024-09-26 DIAGNOSIS — Z76.89 ENCOUNTER TO ESTABLISH CARE: ICD-10-CM

## 2024-09-26 DIAGNOSIS — M81.0 AGE-RELATED OSTEOPOROSIS WITHOUT CURRENT PATHOLOGICAL FRACTURE: ICD-10-CM

## 2024-09-26 DIAGNOSIS — R73.03 PREDIABETES: ICD-10-CM

## 2024-09-26 DIAGNOSIS — I48.0 HYPERCOAGULABLE STATE DUE TO PAROXYSMAL ATRIAL FIBRILLATION (HCC): ICD-10-CM

## 2024-09-26 DIAGNOSIS — Z12.31 ENCOUNTER FOR SCREENING MAMMOGRAM FOR BREAST CANCER: ICD-10-CM

## 2024-09-26 DIAGNOSIS — E66.09 CLASS 1 OBESITY DUE TO EXCESS CALORIES WITH SERIOUS COMORBIDITY AND BODY MASS INDEX (BMI) OF 31.0 TO 31.9 IN ADULT: ICD-10-CM

## 2024-09-26 PROCEDURE — 99214 OFFICE O/P EST MOD 30 MIN: CPT

## 2024-09-26 PROCEDURE — 3074F SYST BP LT 130 MM HG: CPT

## 2024-09-26 PROCEDURE — 3079F DIAST BP 80-89 MM HG: CPT

## 2024-09-26 ASSESSMENT — FIBROSIS 4 INDEX: FIB4 SCORE: 1.78

## 2024-09-26 ASSESSMENT — PATIENT HEALTH QUESTIONNAIRE - PHQ9: CLINICAL INTERPRETATION OF PHQ2 SCORE: 0

## 2024-09-26 NOTE — ASSESSMENT & PLAN NOTE
Dexa 7/4/24 FINDINGS:  The lumbar spine has a mean bone mineral density of 0.854 g/cm2, with a T score of -2.6 and a Z score of -1.4.   The proximal left femur has a mean bone mineral density of 0.841 g/cm2, with a T score of -1.3 and a Z score of 0.0.     IMPRESSION:   According to the World Health Organization classification, bone mineral density of this patient is osteoporotic in the lumbar spine and osteopenic in the left proximal femur.   10-year Probability of Fracture:  Major Osteoporotic     10.5%  Hip     1.9%  Population      [place] ()    Pt has increased exercise, increased Vit D and calcium.   Will plan to repeat Dexa 2 years  Consider bisphosphonate, declines today

## 2024-09-26 NOTE — ASSESSMENT & PLAN NOTE
Chronic, ongoing. Reports problems with balance after stroke. Has done PT for this. Using walker for stabilization.

## 2024-09-26 NOTE — ASSESSMENT & PLAN NOTE
Orders:    Patient identified as having weight management issue.  Appropriate orders and counseling given.

## 2024-09-26 NOTE — PROGRESS NOTES
"Subjective:     CC: Diagnoses of Age-related osteoporosis without current pathological fracture, Disequilibrium, Essential hypertension, Hypercoagulable state due to paroxysmal atrial fibrillation (HCC), Prediabetes, Encounter to establish care, Encounter for screening mammogram for breast cancer, and Class 1 obesity due to excess calories with serious comorbidity and body mass index (BMI) of 31.0 to 31.9 in adult were pertinent to this visit.    Pt presents today to establish care with me, prior pcp gerardo. She is feeling well to day with no acute concerns.     Reports she has upcoming dental surgery/implants for which she will take antibiotics prophylactic.    HPI:   Tonya presents today with    Problem   Age-Related Osteoporosis Without Current Pathological Fracture   Obesity Due to Excess Calories With Serious Comorbidity   Hypercoagulable State Due to Paroxysmal Atrial Fibrillation (Hcc)   Disequilibrium    She has issues balancing secondary to her stroke.  She is on warfarin for Afib.  She has fallen twice but she has caught herself.     Prediabetes   Essential Hypertension     ROS:  Review of Systems   Neurological:         Balance reduced.   All other systems reviewed and are negative.      Objective:     Exam:  /80 (BP Location: Right arm, Patient Position: Sitting, BP Cuff Size: Adult)   Pulse 77   Temp 36.7 °C (98.1 °F) (Temporal)   Resp 16   Ht 1.651 m (5' 5\")   Wt 86.6 kg (191 lb)   SpO2 97%   BMI 31.78 kg/m²  Body mass index is 31.78 kg/m².    Physical Exam  Vitals reviewed.   Constitutional:       General: She is not in acute distress.     Appearance: Normal appearance. She is obese. She is not ill-appearing.   HENT:      Head: Normocephalic and atraumatic.      Right Ear: Tympanic membrane is scarred.      Left Ear: There is impacted cerumen.   Cardiovascular:      Rate and Rhythm: Normal rate and regular rhythm.      Pulses: Normal pulses.      Heart sounds: Murmur heard. "   Pulmonary:      Effort: Pulmonary effort is normal. No respiratory distress.      Breath sounds: Normal breath sounds.   Abdominal:      General: Bowel sounds are normal.      Palpations: Abdomen is soft.   Skin:     General: Skin is warm and dry.      Findings: No rash.   Neurological:      General: No focal deficit present.      Mental Status: She is alert and oriented to person, place, and time.      Gait: Gait abnormal (4 wheel walker).   Psychiatric:         Mood and Affect: Mood normal.         Behavior: Behavior normal.         Labs:    Latest Reference Range & Units 08/17/24 07:32 08/23/24 00:00 09/06/24 00:00 09/20/24 00:00   WBC 4.8 - 10.8 K/uL 5.3      RBC 4.20 - 5.40 M/uL 5.22      Hemoglobin 12.0 - 16.0 g/dL 15.8      Hematocrit 37.0 - 47.0 % 48.6 (H)      MCV 81.4 - 97.8 fL 93.1      MCH 27.0 - 33.0 pg 30.3      MCHC 32.2 - 35.5 g/dL 32.5      RDW 35.9 - 50.0 fL 49.9      Platelet Count 164 - 446 K/uL 216      MPV 9.0 - 12.9 fL 10.8      Neutrophils-Polys 44.00 - 72.00 % 51.40      Neutrophils (Absolute) 1.82 - 7.42 K/uL 2.71      Lymphocytes 22.00 - 41.00 % 35.40      Lymphs (Absolute) 1.00 - 4.80 K/uL 1.86      Monocytes 0.00 - 13.40 % 8.00      Monos (Absolute) 0.00 - 0.85 K/uL 0.42      Eosinophils 0.00 - 6.90 % 4.00      Eos (Absolute) 0.00 - 0.51 K/uL 0.21      Basophils 0.00 - 1.80 % 1.00      Baso (Absolute) 0.00 - 0.12 K/uL 0.05      Immature Granulocytes 0.00 - 0.90 % 0.20      Immature Granulocytes (abs) 0.00 - 0.11 K/uL 0.01      Nucleated RBC 0.00 - 0.20 /100 WBC 0.00      NRBC (Absolute) K/uL 0.00      Sodium 135 - 145 mmol/L 141      Potassium 3.6 - 5.5 mmol/L 3.8      Chloride 96 - 112 mmol/L 104      Co2 20 - 33 mmol/L 25      Anion Gap 7.0 - 16.0  12.0      Glucose 65 - 99 mg/dL 90      Bun 8 - 22 mg/dL 29 (H)      Creatinine 0.50 - 1.40 mg/dL 0.75      GFR (CKD-EPI) >60 mL/min/1.73 m 2 83      Calcium 8.5 - 10.5 mg/dL 9.8      Correct Calcium 8.5 - 10.5 mg/dL 9.6      AST(SGOT) 12  - 45 U/L 22      ALT(SGPT) 2 - 50 U/L 18      Alkaline Phosphatase 30 - 99 U/L 60      Total Bilirubin 0.1 - 1.5 mg/dL 0.4      Albumin 3.2 - 4.9 g/dL 4.2      Total Protein 6.0 - 8.2 g/dL 6.8      Globulin 1.9 - 3.5 g/dL 2.6      A-G Ratio g/dL 1.6      Glycohemoglobin 4.0 - 5.6 % 5.8 (H)      Estim. Avg Glu mg/dL 120      Fasting Status  Fasting      Cholesterol,Tot 100 - 199 mg/dL 156      Triglycerides 0 - 149 mg/dL 83      HDL >=40 mg/dL 52      LDL <100 mg/dL 87      INR 2.00 - 3.50   2.60 (E) 2.50 (E) 2.10 (E)   (H): Data is abnormally high  (E): External lab result    Assessment & Plan:     73 y.o. female with the following -     Assessment & Plan  Age-related osteoporosis without current pathological fracture  Dexa 7/4/24 FINDINGS:  The lumbar spine has a mean bone mineral density of 0.854 g/cm2, with a T score of -2.6 and a Z score of -1.4.   The proximal left femur has a mean bone mineral density of 0.841 g/cm2, with a T score of -1.3 and a Z score of 0.0.     IMPRESSION:   According to the World Health Organization classification, bone mineral density of this patient is osteoporotic in the lumbar spine and osteopenic in the left proximal femur.   10-year Probability of Fracture:  Major Osteoporotic     10.5%  Hip     1.9%  Population      [place] ()    Pt has increased exercise, increased Vit D and calcium.   Will plan to repeat Dexa 2 years  Consider bisphosphonate, declines today         Disequilibrium  Chronic, ongoing. Reports problems with balance after stroke. Has done PT for this. Using walker for stabilization.            Essential hypertension  Chronic, stable. Bp in clinic today 118/80. Currently taking coreg 25 mg twice daily, chlorthalidone 25 mg daily, catapres 0.1 mg daily, losartan 100 mg daily.         Hypercoagulable state due to paroxysmal atrial fibrillation (HCC)  Chronic, stable. Using coumadin daily, home test for INR goal between 2-3.          Prediabetes  Chronic, ongoing.  Discussed healthy lifestyle recommendations. Plan to recheck q6 months.         Encounter to establish care         Encounter for screening mammogram for breast cancer    Orders:    MA-SCREENING MAMMO BILAT W/TOMOSYNTHESIS W/CAD; Future    Class 1 obesity due to excess calories with serious comorbidity and body mass index (BMI) of 31.0 to 31.9 in adult    Orders:    Patient identified as having weight management issue.  Appropriate orders and counseling given.       Patient was educated in proper administration of medication(s) ordered today including safety, possible SE, risks, benefits, rationale and alternatives to therapy.   Supportive care, differential diagnoses, and indications for immediate follow-up discussed with patient.    Pathogenesis of diagnosis discussed including typical length and natural progression.    Instructed to return to clinic or nearest emergency department for any change in condition, further concerns, or worsening of symptoms.  Patient states understanding of the plan of care and discharge instructions.    Return in about 6 months (around 3/26/2025) for Annual Wellness.    Please note that this dictation was created using voice recognition software. I have made every reasonable attempt to correct obvious errors, but I expect that there are errors of grammar and possibly content that I did not discover before finalizing the note.

## 2024-09-26 NOTE — ASSESSMENT & PLAN NOTE
Chronic, stable. Bp in clinic today 118/80. Currently taking coreg 25 mg twice daily, chlorthalidone 25 mg daily, catapres 0.1 mg daily, losartan 100 mg daily.

## 2024-10-01 ENCOUNTER — TELEPHONE (OUTPATIENT)
Dept: CARDIOLOGY | Facility: MEDICAL CENTER | Age: 74
End: 2024-10-01
Payer: MEDICARE

## 2024-10-04 LAB — INR PPP: 2.3 (ref 2–3.5)

## 2024-10-07 ENCOUNTER — ANTICOAGULATION MONITORING (OUTPATIENT)
Dept: VASCULAR LAB | Facility: MEDICAL CENTER | Age: 74
End: 2024-10-07
Payer: MEDICARE

## 2024-10-07 DIAGNOSIS — Z79.01 LONG TERM (CURRENT) USE OF ANTICOAGULANTS: ICD-10-CM

## 2024-10-07 DIAGNOSIS — I48.0 PAROXYSMAL ATRIAL FIBRILLATION (HCC): ICD-10-CM

## 2024-10-07 DIAGNOSIS — Z86.73 HISTORY OF ISCHEMIC STROKE: ICD-10-CM

## 2024-10-18 ENCOUNTER — ANTICOAGULATION MONITORING (OUTPATIENT)
Dept: VASCULAR LAB | Facility: MEDICAL CENTER | Age: 74
End: 2024-10-18
Payer: MEDICARE

## 2024-10-18 DIAGNOSIS — Z79.01 LONG TERM (CURRENT) USE OF ANTICOAGULANTS: ICD-10-CM

## 2024-10-18 DIAGNOSIS — I48.0 PAROXYSMAL ATRIAL FIBRILLATION (HCC): ICD-10-CM

## 2024-10-18 DIAGNOSIS — Z86.73 HISTORY OF ISCHEMIC STROKE: ICD-10-CM

## 2024-10-18 LAB — INR PPP: 2.7 (ref 2–3.5)

## 2024-10-21 DIAGNOSIS — I10 ESSENTIAL HYPERTENSION: ICD-10-CM

## 2024-10-21 RX ORDER — CARVEDILOL 25 MG/1
50 TABLET ORAL 2 TIMES DAILY
Qty: 60 TABLET | Refills: 0 | Status: SHIPPED | OUTPATIENT
Start: 2024-10-21 | End: 2024-10-23

## 2024-10-23 DIAGNOSIS — I10 ESSENTIAL HYPERTENSION: ICD-10-CM

## 2024-10-23 RX ORDER — CARVEDILOL 25 MG/1
50 TABLET ORAL 2 TIMES DAILY
Qty: 120 TABLET | Refills: 0 | Status: SHIPPED | OUTPATIENT
Start: 2024-10-23 | End: 2024-10-28

## 2024-10-23 RX ORDER — CARVEDILOL 25 MG/1
TABLET ORAL
Qty: 60 TABLET | Refills: 0 | OUTPATIENT
Start: 2024-10-23

## 2024-10-23 RX ORDER — CARVEDILOL 25 MG/1
50 TABLET ORAL 2 TIMES DAILY
Qty: 120 TABLET | Refills: 0 | Status: SHIPPED | OUTPATIENT
Start: 2024-10-23 | End: 2024-10-23 | Stop reason: SDUPTHER

## 2024-10-28 DIAGNOSIS — I10 ESSENTIAL HYPERTENSION: ICD-10-CM

## 2024-10-28 DIAGNOSIS — I48.0 PAROXYSMAL ATRIAL FIBRILLATION (HCC): ICD-10-CM

## 2024-10-28 DIAGNOSIS — Z79.01 CHRONIC ANTICOAGULATION: ICD-10-CM

## 2024-10-28 DIAGNOSIS — E78.5 DYSLIPIDEMIA: ICD-10-CM

## 2024-10-28 RX ORDER — CARVEDILOL 25 MG/1
50 TABLET ORAL 2 TIMES DAILY
Qty: 360 TABLET | Refills: 3 | Status: SHIPPED | OUTPATIENT
Start: 2024-10-28

## 2024-10-28 RX ORDER — WARFARIN SODIUM 5 MG/1
TABLET ORAL
Qty: 200 TABLET | Refills: 3 | Status: SHIPPED | OUTPATIENT
Start: 2024-10-28

## 2024-10-28 RX ORDER — LOSARTAN POTASSIUM 100 MG/1
100 TABLET ORAL
Qty: 100 TABLET | Refills: 3 | Status: SHIPPED | OUTPATIENT
Start: 2024-10-28

## 2024-10-28 RX ORDER — DILTIAZEM HYDROCHLORIDE 120 MG/1
120 TABLET, FILM COATED ORAL 2 TIMES DAILY
Qty: 200 TABLET | Refills: 3 | Status: SHIPPED | OUTPATIENT
Start: 2024-10-28

## 2024-10-28 RX ORDER — LORATADINE 10 MG/1
10 TABLET ORAL DAILY
Qty: 100 TABLET | Refills: 3 | Status: SHIPPED | OUTPATIENT
Start: 2024-10-28

## 2024-10-28 RX ORDER — CHLORTHALIDONE 25 MG/1
25 TABLET ORAL EVERY MORNING
Qty: 100 TABLET | Refills: 3 | Status: SHIPPED | OUTPATIENT
Start: 2024-10-28

## 2024-10-28 RX ORDER — ATORVASTATIN CALCIUM 10 MG/1
10 TABLET, FILM COATED ORAL NIGHTLY
Qty: 100 TABLET | Refills: 3 | Status: SHIPPED | OUTPATIENT
Start: 2024-10-28

## 2024-10-28 RX ORDER — CLONIDINE HYDROCHLORIDE 0.1 MG/1
0.1 TABLET ORAL 2 TIMES DAILY
Qty: 200 TABLET | Refills: 3 | Status: SHIPPED | OUTPATIENT
Start: 2024-10-28

## 2024-11-01 LAB — INR PPP: 2.2 (ref 2–3.5)

## 2024-11-04 ENCOUNTER — ANTICOAGULATION MONITORING (OUTPATIENT)
Dept: VASCULAR LAB | Facility: MEDICAL CENTER | Age: 74
End: 2024-11-04
Payer: MEDICARE

## 2024-11-04 DIAGNOSIS — Z86.73 HISTORY OF ISCHEMIC STROKE: ICD-10-CM

## 2024-11-04 DIAGNOSIS — Z79.01 LONG TERM (CURRENT) USE OF ANTICOAGULANTS: ICD-10-CM

## 2024-11-04 DIAGNOSIS — I48.0 PAROXYSMAL ATRIAL FIBRILLATION (HCC): ICD-10-CM

## 2024-11-04 NOTE — PROGRESS NOTES
Anticoagulation Summary  As of 2024      INR goal:  2.0-3.0   TTR:  85.4% (4.3 y)   INR used for dosin.20 (2024)   Warfarin maintenance plan:  5 mg (5 mg x 1) every Sat; 10 mg (5 mg x 2) all other days   Weekly warfarin total:  65 mg   Plan last modified:  Hyacinth Bliss A.P.N. (7/15/2024)   Next INR check:  2024   Target end date:  Indefinite    Indications    Paroxysmal atrial fibrillation (HCC) [I48.0]  Long term (current) use of anticoagulants [Z79.01] [Z79.01]  History of ischemic stroke [Z86.73]                 Anticoagulation Episode Summary       INR check location:  Anticoagulation Clinic    Preferred lab:  --    Send INR reminders to:  --    Comments:    Only call if out of range          Anticoagulation Care Providers       Provider Role Specialty Phone number    Hyacinth Jones, EVELIA.P.R.N.  Nurse Practitioner Family [phone]          Anticoagulation Patient Findings      Per chart review, pt does not want to be contacted if INR is therapeutic     INR therapeutic  Reason(s) for out of range INR today: N/A      Unless patient reports any changes that would warrant an adjustment to the plan:  Warfarin Plan: Continue regimen as listed above.    Next INR in 2 week(s).    Judi Davidson, IdrisD, BCACP

## 2024-11-15 ENCOUNTER — ANTICOAGULATION MONITORING (OUTPATIENT)
Dept: VASCULAR LAB | Facility: MEDICAL CENTER | Age: 74
End: 2024-11-15

## 2024-11-15 ENCOUNTER — OFFICE VISIT (OUTPATIENT)
Dept: CARDIOLOGY | Facility: MEDICAL CENTER | Age: 74
End: 2024-11-15
Attending: NURSE PRACTITIONER
Payer: MEDICARE

## 2024-11-15 VITALS
WEIGHT: 193.2 LBS | SYSTOLIC BLOOD PRESSURE: 128 MMHG | OXYGEN SATURATION: 92 % | HEART RATE: 72 BPM | RESPIRATION RATE: 18 BRPM | BODY MASS INDEX: 32.19 KG/M2 | HEIGHT: 65 IN | DIASTOLIC BLOOD PRESSURE: 70 MMHG

## 2024-11-15 DIAGNOSIS — I48.0 PAROXYSMAL ATRIAL FIBRILLATION (HCC): ICD-10-CM

## 2024-11-15 DIAGNOSIS — I10 ESSENTIAL HYPERTENSION: ICD-10-CM

## 2024-11-15 DIAGNOSIS — E78.5 DYSLIPIDEMIA: ICD-10-CM

## 2024-11-15 DIAGNOSIS — D68.69 HYPERCOAGULABLE STATE DUE TO PAROXYSMAL ATRIAL FIBRILLATION (HCC): ICD-10-CM

## 2024-11-15 DIAGNOSIS — Z79.01 LONG TERM (CURRENT) USE OF ANTICOAGULANTS: ICD-10-CM

## 2024-11-15 DIAGNOSIS — Z86.73 HISTORY OF ISCHEMIC STROKE: ICD-10-CM

## 2024-11-15 DIAGNOSIS — R42 DISEQUILIBRIUM: ICD-10-CM

## 2024-11-15 DIAGNOSIS — I48.0 HYPERCOAGULABLE STATE DUE TO PAROXYSMAL ATRIAL FIBRILLATION (HCC): ICD-10-CM

## 2024-11-15 LAB
EKG IMPRESSION: NORMAL
INR PPP: 3.4 (ref 2–3.5)

## 2024-11-15 PROCEDURE — 93005 ELECTROCARDIOGRAM TRACING: CPT | Performed by: NURSE PRACTITIONER

## 2024-11-15 PROCEDURE — 99214 OFFICE O/P EST MOD 30 MIN: CPT | Performed by: NURSE PRACTITIONER

## 2024-11-15 PROCEDURE — 93010 ELECTROCARDIOGRAM REPORT: CPT | Performed by: INTERNAL MEDICINE

## 2024-11-15 PROCEDURE — 3078F DIAST BP <80 MM HG: CPT | Performed by: NURSE PRACTITIONER

## 2024-11-15 PROCEDURE — 99212 OFFICE O/P EST SF 10 MIN: CPT | Performed by: NURSE PRACTITIONER

## 2024-11-15 PROCEDURE — 3074F SYST BP LT 130 MM HG: CPT | Performed by: NURSE PRACTITIONER

## 2024-11-15 ASSESSMENT — FIBROSIS 4 INDEX: FIB4 SCORE: 1.8

## 2024-11-15 NOTE — PROGRESS NOTES
Chief Complaint   Patient presents with    Atrial Fibrillation     F/V Dx: Paroxysmal atrial fibrillation (HCC)        Hypertension    Hyperlipidemia       Subjective     Eulalia Diamond Del Valle is a 74 y.o. female who presents today with her , Allen, for paroxysmal atrial fibrillation follow-up.  Patient has additional medical problems of history of ischemic CVA, dyslipidemia, hypertension.    Patient of Dr. Mcleod, patient was last seen on 10/5/2023.    Overall, Patient feels well, denies chest pain, shortness of breath, palpitations, dizziness/lightheadedness, orthopnea, PND or Edema.  However, patient continues to have stroke deficit with balance and equilibrium.  Patient has been discharged from physical therapy.    EKG in office today personally interpreted by me as sinus rhythm with no acute ST changes, .      Blood pressure well-controlled at home patient notes anxiety this morning with traveling into office.  A-fib well-controlled.  We will continue medical therapy as previously prescribed.  Recommend evaluation with ENT or physiatry for balance per PCP.  Patient to follow-up in 6 months with Dr. Mcleod, sooner if needed.    Past Medical History:   Diagnosis Date    Arrhythmia     Blood transfusion without reported diagnosis     Cataract     Clotting disorder (HCC)     History of echocardiogram - 7/2019 normal     Hyperlipidemia     Hypertension     Ischemic cerebrovascular accident (CVA) (HCC) 05/05/2017    Obesity (BMI 30-39.9)      Past Surgical History:   Procedure Laterality Date    CATARACT EXTRACTION WITH IOL      DENTAL EXTRACTION(S)      OTHER      artery repair in 1970 related to childbirth     Family History   Problem Relation Age of Onset    Heart Disease Mother     Obesity Mother     Heart Attack Father         at age of 72    Alcohol abuse Maternal Grandfather     Ovarian Cancer Paternal Grandmother         hand    Alcohol abuse Paternal Grandfather      Social History     Socioeconomic  History    Marital status:      Spouse name: Not on file    Number of children: Not on file    Years of education: Not on file    Highest education level: 12th grade   Occupational History    Not on file   Tobacco Use    Smoking status: Never    Smokeless tobacco: Never   Vaping Use    Vaping status: Never Used   Substance and Sexual Activity    Alcohol use: Not Currently     Comment: rare wine    Drug use: Never    Sexual activity: Not Currently     Partners: Male     Comment:     Other Topics Concern    Not on file   Social History Narrative    Not on file     Social Drivers of Health     Financial Resource Strain: Low Risk  (9/24/2024)    Overall Financial Resource Strain (CARDIA)     Difficulty of Paying Living Expenses: Not hard at all   Food Insecurity: No Food Insecurity (9/24/2024)    Hunger Vital Sign     Worried About Running Out of Food in the Last Year: Never true     Ran Out of Food in the Last Year: Never true   Transportation Needs: No Transportation Needs (9/24/2024)    PRAPARE - Transportation     Lack of Transportation (Medical): No     Lack of Transportation (Non-Medical): No   Physical Activity: Insufficiently Active (9/24/2024)    Exercise Vital Sign     Days of Exercise per Week: 1 day     Minutes of Exercise per Session: 10 min   Stress: No Stress Concern Present (9/24/2024)    [employer] [place] [employer] - Occupational Stress Questionnaire     Feeling of Stress : Not at all   Social Connections: Moderately Integrated (9/24/2024)    Social Connection and Isolation Panel [NHANES]     Frequency of Communication with Friends and Family: Twice a week     Frequency of Social Gatherings with Friends and Family: Once a week     Attends Gnosticism Services: More than 4 times per year     Active Member of Clubs or Organizations: No     Attends Club or Organization Meetings: Never     Marital Status:    Intimate Partner Violence: Not on file   Housing Stability: Low Risk  " (9/24/2024)    Housing Stability Vital Sign     Unable to Pay for Housing in the Last Year: No     Number of Times Moved in the Last Year: 0     Homeless in the Last Year: No     Allergies   Allergen Reactions    Sulfa Drugs Rash     Outpatient Encounter Medications as of 11/15/2024   Medication Sig Dispense Refill    carvedilol (COREG) 25 MG Tab Take 2 Tablets by mouth 2 times a day. 360 Tablet 3    atorvastatin (LIPITOR) 10 MG Tab Take 1 Tablet by mouth every evening. 100 Tablet 3    chlorthalidone (HYGROTON) 25 MG Tab Take 1 Tablet by mouth every morning. 100 Tablet 3    cloNIDine (CATAPRES) 0.1 MG Tab Take 1 Tablet by mouth 2 times a day. 200 Tablet 3    dilTIAZem (CARDIZEM) 120 MG Tab Take 1 Tablet by mouth 2 times a day. 200 Tablet 3    loratadine (CLARITIN) 10 MG Tab Take 1 Tablet by mouth every day. 100 Tablet 3    losartan (COZAAR) 100 MG Tab Take 1 Tablet by mouth every day. 100 Tablet 3    warfarin (COUMADIN) 5 MG Tab Follow directions per anticoagulation clinic. 5 mg (5 mg x 1) every Wed; 10 mg (5 mg x 2) all other days 200 Tablet 3    Calcium Carb-Cholecalciferol (CALCIUM 500 + D3 PO) Take  by mouth.      Omega-3 Fatty Acids (OMEGA 3 PO) Take  by mouth.       No facility-administered encounter medications on file as of 11/15/2024.     ROS Complete review of systems negative except as noted in HPI/subjective           Objective     /70 (BP Location: Left arm, Patient Position: Sitting, BP Cuff Size: Adult)   Pulse 72   Resp 18   Ht 1.651 m (5' 5\")   Wt 87.6 kg (193 lb 3.2 oz)   SpO2 92%   BMI 32.15 kg/m²     Physical Exam  Vitals reviewed.   Constitutional:       Appearance: She is well-developed.   HENT:      Head: Normocephalic and atraumatic.   Eyes:      Pupils: Pupils are equal, round, and reactive to light.   Neck:      Vascular: No JVD.   Cardiovascular:      Rate and Rhythm: Normal rate and regular rhythm.      Heart sounds: Normal heart sounds. No murmur heard.     No friction rub. " No gallop.   Pulmonary:      Effort: Pulmonary effort is normal. No respiratory distress.      Breath sounds: Normal breath sounds.   Abdominal:      General: Bowel sounds are normal. There is no distension.      Palpations: Abdomen is soft.   Musculoskeletal:      Right lower leg: No edema.      Left lower leg: No edema.   Skin:     General: Skin is warm and dry.      Findings: No erythema.   Neurological:      Mental Status: She is alert and oriented to person, place, and time.   Psychiatric:         Behavior: Behavior normal.       Lab Results   Component Value Date/Time    CHOLSTRLTOT 156 08/17/2024 07:32 AM    LDL 87 08/17/2024 07:32 AM    HDL 52 08/17/2024 07:32 AM    TRIGLYCERIDE 83 08/17/2024 07:32 AM       Lab Results   Component Value Date/Time    SODIUM 141 08/17/2024 07:32 AM    POTASSIUM 3.8 08/17/2024 07:32 AM    CHLORIDE 104 08/17/2024 07:32 AM    CO2 25 08/17/2024 07:32 AM    GLUCOSE 90 08/17/2024 07:32 AM    BUN 29 (H) 08/17/2024 07:32 AM    CREATININE 0.75 08/17/2024 07:32 AM     Lab Results   Component Value Date/Time    ALKPHOSPHAT 60 08/17/2024 07:32 AM    ASTSGOT 22 08/17/2024 07:32 AM    ALTSGPT 18 08/17/2024 07:32 AM    TBILIRUBIN 0.4 08/17/2024 07:32 AM      TTE (10/27/2023):  Normal left ventricular systolic function. The left ventricular   ejection fraction is visually estimated to be 60%.   Grade I diastolic dysfunction.   The right ventricle is normal in size and systolic function.   No significant valvular abnormalities.   No prior study is available for comparison.          Assessment & Plan     1. Paroxysmal atrial fibrillation (HCC)  EKG      2. Essential hypertension        3. Hypercoagulable state due to paroxysmal atrial fibrillation (HCC)        4. Dyslipidemia        5. Disequilibrium        6. History of ischemic stroke            Medical Decision Making: Today's Assessment/Status/Plan:        Paroxysmal afib  Asymptomatic. [id number] 5 (age, female, HTN, CVAx2).    -Continue  diltiazem 120mg twice daily and carvedilol 50mg tiwce daily.   -Continue warfarin     HTN  -Well-controlled at home per HPI  -Continue losartan 100mg daily, clonidine 0.2mg daily, chlorthalidone 25 mg daily  -Continue diltiazem and carvedilol as above  -Counseled the patient to continue to measure BP at home.  If home BPs remains above 130/80, will plan to increase chlorthalidone to 37.5 mg daily.     Dyslipidemia  Lites within normal limits last labs.  -Continue simvastatin 10mg daily    Disequilibrium; history ischemic CVA  -Likely associated with CVA  -Has been concerned regarding inner ear  -Follow-up with PCP for ENT versus physiatry referral    FU in clinic in 6 months with Dr. Mcleod. Sooner if needed.    Patient verbalizes understanding and agrees with the plan of care.     I personally spent a total of 30 minutes which includes face-to-face time and non-face-to-face time spent on preparing to see the patient, reviewing prior notes and tests, obtaining history from the patient, performing a medically appropriate exam, counseling and educating the patient, ordering medications/tests/procedures/referrals as clinically indicated, , and documenting information in the electronic medical record.    Carlie Whaley A.P.R.N, HF-Cert   [facility]  [phone]    PLEASE NOTE: This Note was created using voice recognition Software. I have made every reasonable attempt to correct obvious errors, but I expect that there are errors of grammar and possibly content that I did not discover before finalizing the note

## 2024-11-15 NOTE — PATIENT INSTRUCTIONS
Checking Blood Pressure:  -Blood pressure cuff, spend in the $40-65, with good return policy  -It should be automatic, upper arm, measure your arm to get the correct size, probably adult Large  -Put the cuff in place, rest arm on table near height of your heart, sit quietly for 5 min, legs uncrossed, with back support, then take your blood pressure, write it down, keep a log  -Check once a day. Ideally, 2 hours after medications.  -Can bring your cuff to at least one appointment where it can be calibrated to a manual cuff if you are concerned.  -Goal blood pressure is at least under 130/80, ideally under 120/80.  If you think your BP is overall too high, let us know in the office, we can adjust medications, can use [employer] or call the [facility] office: [phone].  -If you feel dizzy, please check your blood pressure.  If your blood pressure is less than 90/60 with dizziness call our office at [phone].    -Continue to monitor blood pressures, heart rates, and daily weights in log provided in office today. Please bring to next appointment to review with provider.     Salt=sodium=sea salt, guidelines say stay under 2,500 mg daily   Get salt smart, start looking at labels, count it up.  Salt is hidden in everything, salad dressing, sauces, cheese, most canned food, any processed meat.

## 2024-11-16 NOTE — PROGRESS NOTES
Anticoagulation Summary  As of 11/15/2024      INR goal:  2.0-3.0   TTR:  85.2% (4.3 y)   INR used for dosing:  3.40 (11/15/2024)   Warfarin maintenance plan:  5 mg (5 mg x 1) every Sat; 10 mg (5 mg x 2) all other days   Weekly warfarin total:  65 mg   Plan last modified:  Hyacinth Bliss [email or web address] (7/15/2024)   Next INR check:  11/29/2024   Target end date:  Indefinite    Indications    Paroxysmal atrial fibrillation (HCC) [I48.0]  Long term (current) use of anticoagulants [Z79.01] [Z79.01]  History of ischemic stroke [Z86.73]                 Anticoagulation Episode Summary       INR check location:  Anticoagulation Clinic    Preferred lab:  --    Send INR reminders to:  --    Comments:    Only call if out of range          Anticoagulation Care Providers       Provider Role Specialty Phone number    NATALI PadillaRMIKI.  Nurse Practitioner Family [phone]            Refer to Anticoagulation Patient Findings for HPI  Patient Findings       Positives:  Change in diet/appetite (Eating less greens lately - plans to increase to baseline)    Negatives:  Signs/symptoms of thrombosis, Signs/symptoms of bleeding, Laboratory test error suspected, Change in health, Change in alcohol use, Change in activity, Upcoming invasive procedure, Emergency department visit, Upcoming dental procedure, Missed doses, Extra doses, Change in medications, Hospital admission, Bruising, Other complaints            Spoke with pt.  INR is SUPRA therapeutic.     Pt verifies warfarin weekly dosing.     Will have pt take a decreased dose of 5 mg today and to then continue on w/ her current regimen.    Repeat INR in 2 week(s).     Renny Christina, PharmD, BCACP

## 2024-11-29 LAB — INR PPP: 2.6 (ref 2–3.5)

## 2024-12-02 ENCOUNTER — ANTICOAGULATION MONITORING (OUTPATIENT)
Dept: VASCULAR LAB | Facility: MEDICAL CENTER | Age: 74
End: 2024-12-02
Payer: MEDICARE

## 2024-12-02 DIAGNOSIS — Z86.73 HISTORY OF ISCHEMIC STROKE: ICD-10-CM

## 2024-12-02 DIAGNOSIS — Z79.01 LONG TERM (CURRENT) USE OF ANTICOAGULANTS: ICD-10-CM

## 2024-12-02 DIAGNOSIS — I48.0 PAROXYSMAL ATRIAL FIBRILLATION (HCC): ICD-10-CM

## 2024-12-02 NOTE — PROGRESS NOTES
Anticoagulation Summary  As of 2024      INR goal:  2.0-3.0   TTR:  84.9% (4.4 y)   INR used for dosin.60 (2024)   Warfarin maintenance plan:  5 mg (5 mg x 1) every Sat; 10 mg (5 mg x 2) all other days   Weekly warfarin total:  65 mg   Plan last modified:  Hyacinth Bliss A.P.N. (7/15/2024)   Next INR check:  2024   Target end date:  Indefinite    Indications    Paroxysmal atrial fibrillation (HCC) [I48.0]  Long term (current) use of anticoagulants [Z79.01] [Z79.01]  History of ischemic stroke [Z86.73]                 Anticoagulation Episode Summary       INR check location:  Anticoagulation Clinic    Preferred lab:  --    Send INR reminders to:  --    Comments:    Only call if out of range          Anticoagulation Care Providers       Provider Role Specialty Phone number    Hyacinth Jones, EVELIA.P.R.N.  Nurse Practitioner Family [phone]          Anticoagulation Patient Findings      Per chart review, pt does not want to be contacted if INR is therapeutic     INR therapeutic  Reason(s) for out of range INR today: N/A      Unless patient reports any changes that would warrant an adjustment to the plan:  Warfarin Plan: Continue regimen as listed above.    Next INR in 2 week(s).    Judi Davidson, IdrisD, BCACP

## 2024-12-13 LAB — INR PPP: 2.6 (ref 2–3.5)

## 2024-12-16 ENCOUNTER — ANTICOAGULATION MONITORING (OUTPATIENT)
Dept: VASCULAR LAB | Facility: MEDICAL CENTER | Age: 74
End: 2024-12-16
Payer: MEDICARE

## 2024-12-16 DIAGNOSIS — Z79.01 LONG TERM (CURRENT) USE OF ANTICOAGULANTS: ICD-10-CM

## 2024-12-16 DIAGNOSIS — Z86.73 HISTORY OF ISCHEMIC STROKE: ICD-10-CM

## 2024-12-16 DIAGNOSIS — I48.0 PAROXYSMAL ATRIAL FIBRILLATION (HCC): ICD-10-CM

## 2024-12-16 NOTE — PROGRESS NOTES
Anticoagulation Summary  As of 2024      INR goal:  2.0-3.0   TTR:  85.1% (4.4 y)   INR used for dosin.60 (2024)   Warfarin maintenance plan:  5 mg (5 mg x 1) every Sat; 10 mg (5 mg x 2) all other days   Weekly warfarin total:  65 mg   Plan last modified:  Hyacinth Bliss [email or web address] (7/15/2024)   Next INR check:  2024   Target end date:  Indefinite    Indications    Paroxysmal atrial fibrillation (HCC) [I48.0]  Long term (current) use of anticoagulants [Z79.01] [Z79.01]  History of ischemic stroke [Z86.73]                 Anticoagulation Episode Summary       INR check location:  Anticoagulation Clinic    Preferred lab:  --    Send INR reminders to:  --    Comments:    Only call if out of range          Anticoagulation Care Providers       Provider Role Specialty Phone number    NATALI PadillaRKimmyN.  Nurse Practitioner Family [phone]            Refer to Anticoagulation Patient Findings for HPI    INR therapeutic at 2.6.      Per chart review, pt prefers no phone call if INR in range.    Pt to continue with current warfarin dosing regimen.     Will follow up in 2 week(s).     Renny Christina, PharmD, BCACP

## 2024-12-27 ENCOUNTER — ANTICOAGULATION MONITORING (OUTPATIENT)
Dept: VASCULAR LAB | Facility: MEDICAL CENTER | Age: 74
End: 2024-12-27
Payer: MEDICARE

## 2024-12-27 DIAGNOSIS — Z79.01 LONG TERM (CURRENT) USE OF ANTICOAGULANTS: ICD-10-CM

## 2024-12-27 DIAGNOSIS — I48.0 PAROXYSMAL ATRIAL FIBRILLATION (HCC): ICD-10-CM

## 2024-12-27 DIAGNOSIS — Z86.73 HISTORY OF ISCHEMIC STROKE: ICD-10-CM

## 2024-12-27 LAB — INR PPP: 3 (ref 2–3.5)

## 2024-12-28 NOTE — PROGRESS NOTES
OP Anticoagulation Service Note    Date: 12/27/2024    Anticoagulation Summary  As of 12/27/2024      INR goal:  2.0-3.0   TTR:  85.2% (4.4 y)   INR used for dosing:  3.00 (12/27/2024)   Warfarin maintenance plan:  5 mg (5 mg x 1) every Sat; 10 mg (5 mg x 2) all other days   Weekly warfarin total:  65 mg   Plan last modified:  KENDRA JudgePLILLIAN (7/15/2024)   Next INR check:  1/10/2025   Target end date:  Indefinite    Indications    Paroxysmal atrial fibrillation (HCC) [I48.0]  Long term (current) use of anticoagulants [Z79.01] [Z79.01]  History of ischemic stroke [Z86.73]                 Anticoagulation Episode Summary       INR check location:  Anticoagulation Clinic    Preferred lab:  --    Send INR reminders to:  --    Comments:    Only call if out of range          Anticoagulation Care Providers       Provider Role Specialty Phone number    NATALI PadillaRLILLIAN  Nurse Practitioner Family [phone]          Anticoagulation Patient Findings        Patient's preferred phone number:  Tonya Del Valle  [address] [place] [address]  [phone]        HPI:   The reason for today's call is to prevent morbidity and mortality from a blood clot and/or stroke and to reduce the risk of bleeding while on a anticoagulant.     Care Team    PCP:  MILO Aguero  [address]  [place] [address] [phone]    Referral date:     Patient Care Team                MILO Aguero PCP - General, Nurse Practitioner Family [phone] [address] [place] [address]    Isabella Mcleod M.D. Cardiovascular Disease (Cardiology) [phone]     [address] [place] [address]            Assessment:     INR  therapeutic.     Lab Results   Component Value Date/Time    BUN 29 (H) 08/17/2024 07:32 AM    CREATININE 0.75 08/17/2024 07:32 AM     Lab Results   Component Value Date/Time    HEMOGLOBIN 15.8 08/17/2024 07:32 AM    HEMATOCRIT  48.6 (H) 08/17/2024 07:32 AM    PLATELETCT 216 08/17/2024 07:32 AM    ALKPHOSPHAT 60 08/17/2024 07:32 AM    ASTSGOT 22 08/17/2024 07:32 AM    ALTSGPT 18 08/17/2024 07:32 AM          Current Outpatient Medications:     carvedilol, 50 mg, Oral, BID    atorvastatin, 10 mg, Oral, Nightly    chlorthalidone, 25 mg, Oral, QAM    cloNIDine, 0.1 mg, Oral, BID    dilTIAZem, 120 mg, Oral, BID    loratadine, 10 mg, Oral, DAILY    losartan, 100 mg, Oral, QDAY    warfarin, Follow directions per anticoagulation clinic. 5 mg (5 mg x 1) every Wed; 10 mg (5 mg x 2) all other days    Calcium Carb-Cholecalciferol (CALCIUM 500 + D3 PO), Take  by mouth.    Omega-3 Fatty Acids (OMEGA 3 PO), Take  by mouth.      [id number] Stroke Risk Points: 7   Values used to calculate this score:    Points  Metrics       0        Has Congestive Heart Failure: No       1        Has Hypertension: Yes       2        Age: 74       0        Has Diabetes: No       2        Had Stroke: Yes                 Had TIA: No                 Had Thromboembolism: No       1        Has Vascular Disease: Yes       1        Clinically Relevant Sex: Female     No data recorded     Plan:     Continue the same warfarin dose, as noted above.       Follow-up:     As seen above      Additional information discussed with patient:     Asked patient to please call the anticoagulation clinic if they have any signs/symptoms of bleeding and/or thrombosis or any changes to diet or medications.      National recommendations regarding anticoagulation therapy:     The CHEST guidelines recommends frequent INR monitoring at regular intervals (a few days up to a max of 12 weeks) to ensure patients are on the proper dose of warfarin, and patients are not having any complications from therapy.  INRs can dramatically change over a short time period due to diet, medications, and medical conditions.         [facility]  Phone: [phone]  Fax:  [phone]  On call: [phone]  General scheduling/information [phone]  For emergencies please dial 911  Please do not use [employer] for urgent matters, call the phone numbers listed above.    This note was created using voice recognition software (Dragon). The accuracy of the dictation is limited by the abilities of the software. I have reviewed the note prior to signing, however some errors in grammar and context are still possible. If you have any questions related to this note please do not hesitate to contact our office.

## 2025-01-10 ENCOUNTER — ANTICOAGULATION MONITORING (OUTPATIENT)
Dept: VASCULAR LAB | Facility: MEDICAL CENTER | Age: 75
End: 2025-01-10
Payer: MEDICARE

## 2025-01-10 DIAGNOSIS — Z79.01 LONG TERM (CURRENT) USE OF ANTICOAGULANTS: ICD-10-CM

## 2025-01-10 DIAGNOSIS — Z86.73 HISTORY OF ISCHEMIC STROKE: ICD-10-CM

## 2025-01-10 DIAGNOSIS — I48.0 PAROXYSMAL ATRIAL FIBRILLATION (HCC): ICD-10-CM

## 2025-01-10 LAB — INR PPP: 2.4 (ref 2–3.5)

## 2025-01-11 NOTE — PROGRESS NOTES
Anticoagulation Summary  As of 1/10/2025      INR goal:  2.0-3.0   TTR:  85.3% (4.5 y)   INR used for dosin.40 (1/10/2025)   Warfarin maintenance plan:  5 mg (5 mg x 1) every Sat; 10 mg (5 mg x 2) all other days   Weekly warfarin total:  65 mg   Plan last modified:  Hyacinth Bliss A.P.NKimmy (7/15/2024)   Next INR check:  2025   Target end date:  Indefinite    Indications    Paroxysmal atrial fibrillation (HCC) [I48.0]  Long term (current) use of anticoagulants [Z79.01] [Z79.01]  History of ischemic stroke [Z86.73]                 Anticoagulation Episode Summary       INR check location:  Anticoagulation Clinic    Preferred lab:  --    Send INR reminders to:  --    Comments:    Only call if out of range          Anticoagulation Care Providers       Provider Role Specialty Phone number    NATALI PadillaRKimmyN.  Nurse Practitioner Family [phone]          Anticoagulation Patient Findings      INR is therapeutic  Reason(s) for out of range INR today: N/A      Per chart review, patient prefers to only receive call if INR is out of range.     Pt is not on antiplatelet therapy.    Warfarin Plan: Continue regimen as listed above.    Next INR in 2 week(s).    Keily Rice, PharmD

## 2025-01-16 ENCOUNTER — APPOINTMENT (OUTPATIENT)
Dept: RADIOLOGY | Facility: MEDICAL CENTER | Age: 75
End: 2025-01-16
Attending: EMERGENCY MEDICINE
Payer: MEDICARE

## 2025-01-16 ENCOUNTER — HOSPITAL ENCOUNTER (OUTPATIENT)
Facility: MEDICAL CENTER | Age: 75
End: 2025-01-17
Attending: EMERGENCY MEDICINE | Admitting: STUDENT IN AN ORGANIZED HEALTH CARE EDUCATION/TRAINING PROGRAM
Payer: MEDICARE

## 2025-01-16 DIAGNOSIS — H34.12 CENTRAL RETINAL ARTERY OCCLUSION OF LEFT EYE: Primary | ICD-10-CM

## 2025-01-16 DIAGNOSIS — H53.40 VISUAL FIELD LOSS: ICD-10-CM

## 2025-01-16 DIAGNOSIS — Z86.73 HISTORY OF ISCHEMIC STROKE: ICD-10-CM

## 2025-01-16 LAB
ALBUMIN SERPL BCP-MCNC: 4.2 G/DL (ref 3.2–4.9)
ALBUMIN/GLOB SERPL: 1.5 G/DL
ALP SERPL-CCNC: 62 U/L (ref 30–99)
ALT SERPL-CCNC: 17 U/L (ref 2–50)
ANION GAP SERPL CALC-SCNC: 12 MMOL/L (ref 7–16)
AST SERPL-CCNC: 38 U/L (ref 12–45)
BASOPHILS # BLD AUTO: 0.7 % (ref 0–1.8)
BASOPHILS # BLD: 0.05 K/UL (ref 0–0.12)
BILIRUB SERPL-MCNC: 0.7 MG/DL (ref 0.1–1.5)
BUN SERPL-MCNC: 23 MG/DL (ref 8–22)
CALCIUM ALBUM COR SERPL-MCNC: 9.8 MG/DL (ref 8.5–10.5)
CALCIUM SERPL-MCNC: 10 MG/DL (ref 8.5–10.5)
CHLORIDE SERPL-SCNC: 103 MMOL/L (ref 96–112)
CO2 SERPL-SCNC: 26 MMOL/L (ref 20–33)
CREAT SERPL-MCNC: 0.82 MG/DL (ref 0.5–1.4)
CRP SERPL HS-MCNC: <0.3 MG/DL (ref 0–0.75)
EKG IMPRESSION: NORMAL
EOSINOPHIL # BLD AUTO: 0.18 K/UL (ref 0–0.51)
EOSINOPHIL NFR BLD: 2.5 % (ref 0–6.9)
ERYTHROCYTE [DISTWIDTH] IN BLOOD BY AUTOMATED COUNT: 48.3 FL (ref 35.9–50)
ERYTHROCYTE [SEDIMENTATION RATE] IN BLOOD BY WESTERGREN METHOD: 6 MM/HOUR (ref 0–25)
GFR SERPLBLD CREATININE-BSD FMLA CKD-EPI: 74 ML/MIN/1.73 M 2
GLOBULIN SER CALC-MCNC: 2.8 G/DL (ref 1.9–3.5)
GLUCOSE SERPL-MCNC: 98 MG/DL (ref 65–99)
HCT VFR BLD AUTO: 47.2 % (ref 37–47)
HGB BLD-MCNC: 15.8 G/DL (ref 12–16)
IMM GRANULOCYTES # BLD AUTO: 0.02 K/UL (ref 0–0.11)
IMM GRANULOCYTES NFR BLD AUTO: 0.3 % (ref 0–0.9)
INR PPP: 2.79 (ref 0.87–1.13)
LYMPHOCYTES # BLD AUTO: 2.27 K/UL (ref 1–4.8)
LYMPHOCYTES NFR BLD: 32 % (ref 22–41)
MCH RBC QN AUTO: 30.7 PG (ref 27–33)
MCHC RBC AUTO-ENTMCNC: 33.5 G/DL (ref 32.2–35.5)
MCV RBC AUTO: 91.7 FL (ref 81.4–97.8)
MONOCYTES # BLD AUTO: 0.6 K/UL (ref 0–0.85)
MONOCYTES NFR BLD AUTO: 8.5 % (ref 0–13.4)
NEUTROPHILS # BLD AUTO: 3.97 K/UL (ref 1.82–7.42)
NEUTROPHILS NFR BLD: 56 % (ref 44–72)
NRBC # BLD AUTO: 0 K/UL
NRBC BLD-RTO: 0 /100 WBC (ref 0–0.2)
PLATELET # BLD AUTO: 227 K/UL (ref 164–446)
PMV BLD AUTO: 10 FL (ref 9–12.9)
POTASSIUM SERPL-SCNC: 4.5 MMOL/L (ref 3.6–5.5)
PROT SERPL-MCNC: 7 G/DL (ref 6–8.2)
PROTHROMBIN TIME: 29.6 SEC (ref 12–14.6)
RBC # BLD AUTO: 5.15 M/UL (ref 4.2–5.4)
SODIUM SERPL-SCNC: 141 MMOL/L (ref 135–145)
WBC # BLD AUTO: 7.1 K/UL (ref 4.8–10.8)

## 2025-01-16 PROCEDURE — 83036 HEMOGLOBIN GLYCOSYLATED A1C: CPT

## 2025-01-16 PROCEDURE — 85610 PROTHROMBIN TIME: CPT

## 2025-01-16 PROCEDURE — 36415 COLL VENOUS BLD VENIPUNCTURE: CPT

## 2025-01-16 PROCEDURE — 80053 COMPREHEN METABOLIC PANEL: CPT

## 2025-01-16 PROCEDURE — 85025 COMPLETE CBC W/AUTO DIFF WBC: CPT

## 2025-01-16 PROCEDURE — 93005 ELECTROCARDIOGRAM TRACING: CPT | Mod: TC | Performed by: EMERGENCY MEDICINE

## 2025-01-16 PROCEDURE — 700111 HCHG RX REV CODE 636 W/ 250 OVERRIDE (IP): Mod: JZ | Performed by: EMERGENCY MEDICINE

## 2025-01-16 PROCEDURE — 71045 X-RAY EXAM CHEST 1 VIEW: CPT

## 2025-01-16 PROCEDURE — 85652 RBC SED RATE AUTOMATED: CPT

## 2025-01-16 PROCEDURE — 96374 THER/PROPH/DIAG INJ IV PUSH: CPT

## 2025-01-16 PROCEDURE — 86140 C-REACTIVE PROTEIN: CPT

## 2025-01-16 PROCEDURE — 99285 EMERGENCY DEPT VISIT HI MDM: CPT

## 2025-01-16 RX ORDER — HYDRALAZINE HYDROCHLORIDE 20 MG/ML
20 INJECTION INTRAMUSCULAR; INTRAVENOUS ONCE
Status: COMPLETED | OUTPATIENT
Start: 2025-01-16 | End: 2025-01-16

## 2025-01-16 RX ORDER — IBUPROFEN 200 MG
600 CAPSULE ORAL DAILY
COMMUNITY

## 2025-01-16 RX ORDER — PROPARACAINE HYDROCHLORIDE 5 MG/ML
1 SOLUTION/ DROPS OPHTHALMIC ONCE
Status: DISCONTINUED | OUTPATIENT
Start: 2025-01-16 | End: 2025-01-17 | Stop reason: HOSPADM

## 2025-01-16 RX ADMIN — HYDRALAZINE HYDROCHLORIDE 20 MG: 20 INJECTION, SOLUTION INTRAMUSCULAR; INTRAVENOUS at 21:45

## 2025-01-16 ASSESSMENT — FIBROSIS 4 INDEX: FIB4 SCORE: 1.8

## 2025-01-17 ENCOUNTER — APPOINTMENT (OUTPATIENT)
Dept: CARDIOLOGY | Facility: MEDICAL CENTER | Age: 75
End: 2025-01-17
Attending: STUDENT IN AN ORGANIZED HEALTH CARE EDUCATION/TRAINING PROGRAM
Payer: MEDICARE

## 2025-01-17 ENCOUNTER — APPOINTMENT (OUTPATIENT)
Dept: RADIOLOGY | Facility: MEDICAL CENTER | Age: 75
End: 2025-01-17
Attending: STUDENT IN AN ORGANIZED HEALTH CARE EDUCATION/TRAINING PROGRAM
Payer: MEDICARE

## 2025-01-17 VITALS
OXYGEN SATURATION: 96 % | HEIGHT: 65 IN | HEART RATE: 67 BPM | SYSTOLIC BLOOD PRESSURE: 147 MMHG | WEIGHT: 193.34 LBS | DIASTOLIC BLOOD PRESSURE: 58 MMHG | BODY MASS INDEX: 32.21 KG/M2 | RESPIRATION RATE: 14 BRPM | TEMPERATURE: 98.7 F

## 2025-01-17 PROBLEM — T78.40XA ALLERGIES: Status: ACTIVE | Noted: 2025-01-17

## 2025-01-17 PROBLEM — R91.1 PULMONARY NODULE: Status: ACTIVE | Noted: 2025-01-17

## 2025-01-17 PROBLEM — H34.10 CENTRAL RETINAL ARTERY OCCLUSION: Status: ACTIVE | Noted: 2025-01-17

## 2025-01-17 PROBLEM — E78.5 HYPERLIPIDEMIA: Status: ACTIVE | Noted: 2020-07-16

## 2025-01-17 LAB
CHOLEST SERPL-MCNC: 143 MG/DL (ref 100–199)
EST. AVERAGE GLUCOSE BLD GHB EST-MCNC: 128 MG/DL
HBA1C MFR BLD: 6.1 % (ref 4–5.6)
HDLC SERPL-MCNC: 49 MG/DL
LDLC SERPL CALC-MCNC: 76 MG/DL
LV EJECT FRACT  99904: 70
LV EJECT FRACT MOD 2C 99903: 73.67
LV EJECT FRACT MOD 4C 99902: 60.91
LV EJECT FRACT MOD BP 99901: 68.36
TRIGL SERPL-MCNC: 88 MG/DL (ref 0–149)
TROPONIN T SERPL-MCNC: 15 NG/L (ref 6–19)
TSH SERPL DL<=0.005 MIU/L-ACNC: 3.22 UIU/ML (ref 0.38–5.33)

## 2025-01-17 PROCEDURE — 700117 HCHG RX CONTRAST REV CODE 255: Performed by: EMERGENCY MEDICINE

## 2025-01-17 PROCEDURE — G0378 HOSPITAL OBSERVATION PER HR: HCPCS

## 2025-01-17 PROCEDURE — 700102 HCHG RX REV CODE 250 W/ 637 OVERRIDE(OP): Performed by: STUDENT IN AN ORGANIZED HEALTH CARE EDUCATION/TRAINING PROGRAM

## 2025-01-17 PROCEDURE — 93306 TTE W/DOPPLER COMPLETE: CPT

## 2025-01-17 PROCEDURE — 80061 LIPID PANEL: CPT

## 2025-01-17 PROCEDURE — 70496 CT ANGIOGRAPHY HEAD: CPT

## 2025-01-17 PROCEDURE — 99239 HOSP IP/OBS DSCHRG MGMT >30: CPT | Performed by: STUDENT IN AN ORGANIZED HEALTH CARE EDUCATION/TRAINING PROGRAM

## 2025-01-17 PROCEDURE — 84443 ASSAY THYROID STIM HORMONE: CPT

## 2025-01-17 PROCEDURE — 70498 CT ANGIOGRAPHY NECK: CPT

## 2025-01-17 PROCEDURE — A9270 NON-COVERED ITEM OR SERVICE: HCPCS | Performed by: STUDENT IN AN ORGANIZED HEALTH CARE EDUCATION/TRAINING PROGRAM

## 2025-01-17 PROCEDURE — 99223 1ST HOSP IP/OBS HIGH 75: CPT | Mod: GC | Performed by: STUDENT IN AN ORGANIZED HEALTH CARE EDUCATION/TRAINING PROGRAM

## 2025-01-17 PROCEDURE — 93306 TTE W/DOPPLER COMPLETE: CPT | Mod: 26 | Performed by: INTERNAL MEDICINE

## 2025-01-17 PROCEDURE — 84484 ASSAY OF TROPONIN QUANT: CPT

## 2025-01-17 PROCEDURE — 70551 MRI BRAIN STEM W/O DYE: CPT

## 2025-01-17 RX ORDER — CARVEDILOL 25 MG/1
50 TABLET ORAL 2 TIMES DAILY
Status: DISCONTINUED | OUTPATIENT
Start: 2025-01-17 | End: 2025-01-17

## 2025-01-17 RX ORDER — ASPIRIN 81 MG/1
81 TABLET, CHEWABLE ORAL DAILY
Status: DISCONTINUED | OUTPATIENT
Start: 2025-01-18 | End: 2025-01-17 | Stop reason: HOSPADM

## 2025-01-17 RX ORDER — DILTIAZEM HCL 60 MG
120 TABLET ORAL 2 TIMES DAILY
Status: DISCONTINUED | OUTPATIENT
Start: 2025-01-17 | End: 2025-01-17 | Stop reason: HOSPADM

## 2025-01-17 RX ORDER — ASPIRIN 325 MG
325 TABLET ORAL ONCE
Status: COMPLETED | OUTPATIENT
Start: 2025-01-17 | End: 2025-01-17

## 2025-01-17 RX ORDER — CHLORTHALIDONE 25 MG/1
25 TABLET ORAL EVERY MORNING
Status: DISCONTINUED | OUTPATIENT
Start: 2025-01-17 | End: 2025-01-17 | Stop reason: HOSPADM

## 2025-01-17 RX ORDER — WARFARIN SODIUM 10 MG/1
10 TABLET ORAL
Status: DISCONTINUED | OUTPATIENT
Start: 2025-01-17 | End: 2025-01-17 | Stop reason: HOSPADM

## 2025-01-17 RX ORDER — ASPIRIN 300 MG/1
300 SUPPOSITORY RECTAL DAILY
Status: DISCONTINUED | OUTPATIENT
Start: 2025-01-18 | End: 2025-01-17 | Stop reason: HOSPADM

## 2025-01-17 RX ORDER — ASPIRIN 81 MG/1
81 TABLET, CHEWABLE ORAL DAILY
Qty: 30 TABLET | Refills: 0 | Status: SHIPPED | OUTPATIENT
Start: 2025-01-18 | End: 2025-01-22 | Stop reason: SDUPTHER

## 2025-01-17 RX ORDER — WARFARIN SODIUM 2.5 MG/1
5 TABLET ORAL
Status: DISCONTINUED | OUTPATIENT
Start: 2025-01-18 | End: 2025-01-17 | Stop reason: HOSPADM

## 2025-01-17 RX ORDER — LOSARTAN POTASSIUM 50 MG/1
100 TABLET ORAL
Status: DISCONTINUED | OUTPATIENT
Start: 2025-01-17 | End: 2025-01-17 | Stop reason: HOSPADM

## 2025-01-17 RX ORDER — ATORVASTATIN CALCIUM 80 MG/1
80 TABLET, FILM COATED ORAL NIGHTLY
Status: DISCONTINUED | OUTPATIENT
Start: 2025-01-17 | End: 2025-01-17

## 2025-01-17 RX ORDER — ATORVASTATIN CALCIUM 40 MG/1
40 TABLET, FILM COATED ORAL NIGHTLY
Status: DISCONTINUED | OUTPATIENT
Start: 2025-01-17 | End: 2025-01-17 | Stop reason: HOSPADM

## 2025-01-17 RX ORDER — CARVEDILOL 12.5 MG/1
50 TABLET ORAL 2 TIMES DAILY
Status: DISCONTINUED | OUTPATIENT
Start: 2025-01-17 | End: 2025-01-17 | Stop reason: HOSPADM

## 2025-01-17 RX ORDER — LOSARTAN POTASSIUM 50 MG/1
100 TABLET ORAL
Status: DISCONTINUED | OUTPATIENT
Start: 2025-01-17 | End: 2025-01-17

## 2025-01-17 RX ORDER — LORATADINE 10 MG/1
10 TABLET ORAL DAILY
Status: DISCONTINUED | OUTPATIENT
Start: 2025-01-17 | End: 2025-01-17 | Stop reason: HOSPADM

## 2025-01-17 RX ADMIN — LOSARTAN POTASSIUM 100 MG: 50 TABLET, FILM COATED ORAL at 06:10

## 2025-01-17 RX ADMIN — DILTIAZEM HYDROCHLORIDE 120 MG: 60 TABLET ORAL at 06:10

## 2025-01-17 RX ADMIN — CHLORTHALIDONE 25 MG: 25 TABLET ORAL at 06:10

## 2025-01-17 RX ADMIN — ASPIRIN 325 MG: 325 TABLET ORAL at 01:18

## 2025-01-17 RX ADMIN — LORATADINE 10 MG: 10 TABLET ORAL at 06:10

## 2025-01-17 RX ADMIN — CARVEDILOL 50 MG: 25 TABLET, FILM COATED ORAL at 06:10

## 2025-01-17 RX ADMIN — IOHEXOL 60 ML: 350 INJECTION, SOLUTION INTRAVENOUS at 00:08

## 2025-01-17 ASSESSMENT — COGNITIVE AND FUNCTIONAL STATUS - GENERAL
SUGGESTED CMS G CODE MODIFIER MOBILITY: CJ
DAILY ACTIVITIY SCORE: 24
MOBILITY SCORE: 22
STANDING UP FROM CHAIR USING ARMS: A LITTLE
SUGGESTED CMS G CODE MODIFIER DAILY ACTIVITY: CH
WALKING IN HOSPITAL ROOM: A LITTLE

## 2025-01-17 ASSESSMENT — LIFESTYLE VARIABLES
AVERAGE NUMBER OF DAYS PER WEEK YOU HAVE A DRINK CONTAINING ALCOHOL: 0
EVER HAD A DRINK FIRST THING IN THE MORNING TO STEADY YOUR NERVES TO GET RID OF A HANGOVER: NO
ON A TYPICAL DAY WHEN YOU DRINK ALCOHOL HOW MANY DRINKS DO YOU HAVE: 0
TOTAL SCORE: 0
DOES PATIENT WANT TO STOP DRINKING: NO
ALCOHOL_USE: NO
TOTAL SCORE: 0
EVER FELT BAD OR GUILTY ABOUT YOUR DRINKING: NO
HOW MANY TIMES IN THE PAST YEAR HAVE YOU HAD 5 OR MORE DRINKS IN A DAY: 0
TOTAL SCORE: 0
HAVE YOU EVER FELT YOU SHOULD CUT DOWN ON YOUR DRINKING: NO
HAVE PEOPLE ANNOYED YOU BY CRITICIZING YOUR DRINKING: NO
CONSUMPTION TOTAL: NEGATIVE

## 2025-01-17 ASSESSMENT — CHA2DS2 SCORE
AGE 75 OR GREATER: YES
CHA2DS2 VASC SCORE: 6
SEX: FEMALE
DIABETES: NO
CHF OR LEFT VENTRICULAR DYSFUNCTION: NO
AGE 65 TO 74: NO
HYPERTENSION: YES
PRIOR STROKE OR TIA OR THROMBOEMBOLISM: YES
VASCULAR DISEASE: NO

## 2025-01-17 ASSESSMENT — PATIENT HEALTH QUESTIONNAIRE - PHQ9
2. FEELING DOWN, DEPRESSED, IRRITABLE, OR HOPELESS: NOT AT ALL
SUM OF ALL RESPONSES TO PHQ9 QUESTIONS 1 AND 2: 0
1. LITTLE INTEREST OR PLEASURE IN DOING THINGS: NOT AT ALL

## 2025-01-17 ASSESSMENT — SOCIAL DETERMINANTS OF HEALTH (SDOH)
WITHIN THE PAST 12 MONTHS, YOU WORRIED THAT YOUR FOOD WOULD RUN OUT BEFORE YOU GOT THE MONEY TO BUY MORE: NEVER TRUE
IN THE PAST 12 MONTHS, HAS THE ELECTRIC, GAS, OIL, OR WATER COMPANY THREATENED TO SHUT OFF SERVICE IN YOUR HOME?: NO
WITHIN THE PAST 12 MONTHS, THE FOOD YOU BOUGHT JUST DIDN'T LAST AND YOU DIDN'T HAVE MONEY TO GET MORE: NEVER TRUE
WITHIN THE LAST YEAR, HAVE TO BEEN RAPED OR FORCED TO HAVE ANY KIND OF SEXUAL ACTIVITY BY YOUR PARTNER OR EX-PARTNER?: NO
WITHIN THE LAST YEAR, HAVE YOU BEEN AFRAID OF YOUR PARTNER OR EX-PARTNER?: NO
WITHIN THE LAST YEAR, HAVE YOU BEEN KICKED, HIT, SLAPPED, OR OTHERWISE PHYSICALLY HURT BY YOUR PARTNER OR EX-PARTNER?: NO
WITHIN THE LAST YEAR, HAVE YOU BEEN HUMILIATED OR EMOTIONALLY ABUSED IN OTHER WAYS BY YOUR PARTNER OR EX-PARTNER?: NO

## 2025-01-17 ASSESSMENT — PAIN DESCRIPTION - PAIN TYPE: TYPE: ACUTE PAIN

## 2025-01-17 ASSESSMENT — FIBROSIS 4 INDEX: FIB4 SCORE: 3.05

## 2025-01-17 NOTE — ED NOTES
Medication history reviewed with patient/spouse at bedside.  Med rec is complete  Allergies reviewed.     Patient denies any outpatient antibiotics in the last 30 days.     Anticoagulants taken in the last 14 days? Yes  Anticoagulant: Warfarin, Last dose: 1/15/25 PM.       Zeina Garrido, PhT

## 2025-01-17 NOTE — PROGRESS NOTES
I have seen and evaluated patient at the bedside.   Patient is 74 Y F with hx of CVA 2019, paroxysmal atrial fibrillation on warfarin, hyperlipidemia, hyper tension, who was at the opthomology office complaining of left eye vision changes. Was told to present to the ED to rule out CRAO.     #Left eye vision changes   #Pulmonary nodule   #Atrial fibrillation   -CTA H/N showed approximately 50% stenosis of distal right vertebral artery, multiple pulmonary nodules greatest 5.1 mm.   -ESR and CRP neg   -Case was discussed with Dr. Coley Ophtomologist and recommended stroke workup  -On aspirin and statin.   -MRI of the brain showed no acute process. Remote pontine infarcts. Small right petrous apex effusion.   -Echo pending   -Lipid with LDL 76, TSH 3.22, A1c 6.1.   -Patient is eating and drinking and states that her vision now back to normal. She also reports that she had similar events prior what she called it is ocular migraine. She previously saw neurologist in the past for CVA but not following with neurology outpatient anymore.   -Patient is on warfarin and seeing cardiology outpatient. INR 2.79. advised to follow up with cardiology and discussed consider of DOAC.  -6 clicks is good and patient states she is ambulatory.   -Likely discharge tomorrow if clinically stable.  -Follow up outpatient with opthomologist and PCP   -Outpatient follow-up for pulmonary nodule surveillance

## 2025-01-17 NOTE — H&P
"[facility] Internal Medicine History & Physical Note    Date of Service  1/17/2025    Subjective  Tonya Del Valle is a 74 year-old female with a history of stroke, afib on warfarin, HTN who presented 1/16/2025 with 2 days of left vision loss.    The patient noticed blurry vision and central \"blob\" in the middle of her vision on the L eye on 1/14/25 at approximately 5 PM.  Initially, she reports it was a small blob in the center of her visual field that began to expand over the next several hours and ultimately she was a part of her vision back within that area and has since improved.  She called the ophthalmologist to make an appointment, but they did not have availability and she was ultimately not seen until 1/16/2025. She was reportedly told by her ophthalmologist at [facility] at that appointment that \"whatever damage is done is done\" and that she had a central retinal artery occlusion.  She has a history of prior ischemic stroke which required ICU hospitalization in 2009, from which she still has right lower extremity deficits requiring walker use at home.  At the time of 2009 she was put on warfarin and was told she would be \"stuck \" taking warfarin forever.    During this time, she does not have any headache, neck pain, fevers, chills, chest pain, shortness of breath, abdominal pain, nausea, vomiting, dysuria, hematuria, melena, hematochezia, joint pains, rashes.  She is history of atrial fibrillation for which she takes diltiazem and is on warfarin.  She takes multiple medications for hypertension and takes these reliably. She has a history of cataracts of both eyes that were done several years ago.  She is a never smoker, drinks alcohol socially, no other drug use. She has no reported other allergies other than sulfa drugs.    A review of systems was performed as above. Past medical history, surgical history, family history, social history, allergies, and medications were " reviewed.    Objective  Vitals, labs, and imaging reviewed.    Gen: well-appearing, in NAD  HEENT: normocephalic, atraumatic; see neuro  Neck: no tracheal deviation  CV: RRR, no m/r/g  Lungs: CTAB  Abd: soft, nontender  Neuro: Alert and oriented; normal strength of the upper extremities bilaterally in the left lower extremity bilaterally; strength 1-2/5 in LLE; normal sensation in the upper and lower extremities bilaterally.  Visual field testing reveals normal peripheral vision in each eye individually and both eyes together, however loss of central vision in the left eye; appears to  have appropriate compensation when both eyes are used and has no visual field deficits when using both eyes  Psych: normal affect  Skin: warm and dry    Assessment/Plan:  74F with a history of stroke 2009 c/b residual RLE weakness, afib on warfarin, HTN who presented 1/16/2025 with 2 days of left vision loss likely BRAO/CRAO, improving.    * Vision loss, left eye- (present on admission)  Assessment & Plan  Presented with 2 days of L vision loss that has progressively improved, seen by [facility] outpatient and told she has CRAO; however, unable to view these records from [facility]. Suspect this is consistent with BRAO rather than CRAO but will defer to further ophthalmologic eval. Etiology likely atherosclerotic, low suspicion for cardioembolic phenomena and GCA given normal ESR/CRP. Will obtain TTE bubble for cardioembolic eval though less likely given age. CTA head/neck not concerning for need for CEA. Discussed with Dr. Coley from [facility] for recs on further imaging or other ophtho recs, no other recs at this time other than stroke workup and follow up with outpatient ophtho.  -follow up outpatient with ophtho  -MRI brain  -TTE with bubble  -neuro checks  -tele  -s/p ASA 325mg in ER  -daily ASA    Pulmonary nodule  Assessment & Plan  Incidental 5.1 RUL nodule and 4.3mm REGI module and 4.6mm LLL nodule and 4.2mm REGI nodule.  -f/u  "outpatient    Allergies  Assessment & Plan  -continue home loratadine    Prediabetes- (present on admission)  Assessment & Plan  Rechecked A1c at 6.1    Hyperlipidemia- (present on admission)  Assessment & Plan  Rechecked lipids as a part of stroke workup, LDL not at goal  -increased atorvastatin to high dose 40mg daily    HTN (hypertension)- (present on admission)  Assessment & Plan  BP elevated, allowing some permissive HTN currently  -continue carvedilol, chlorthalidone, losartan as home meds  -holding clonidine  -recommend outpatient follow up for strict BP control    AF (atrial fibrillation) (HCC)- (present on admission)  Assessment & Plan  Reportedly the patient was started on warfarin for anticoagulation related to atrial fibrillation and ischemic stroke in 2009; at that time and since that time patient is convinced that she is \"stuck\" with this medication. Has not used eliquis/xarelto, and this may be appropriate for this patient. She is checking her INR [id number] at home.  -pharmacy to dose warfarin, see stroke  -continue diltiazem  -consider transition to DOAC, will defer to day team      The patient's care was discussed with the patient, any present family, and the attending Dr. Beauchamp. The patient will require a telemetry bed on medical service due to obs evaluation of stroke.    VTE prophylaxis: warfarin  Diet: regular  Code Status: full  Surrogate Decision Maker:   Disposition: likely 1/17/25 pending stroke workup, will need OP ophtho and PCP appt on discharge    Dragan Gudino MD  Internal Medicine Resident    "

## 2025-01-17 NOTE — PROGRESS NOTES
Met patient in the gates. She is alert and oriented x4. I did a swallow evaluation with her and she passed. She had no coughing or choking.

## 2025-01-17 NOTE — ED NOTES
Bedside report received from off going RN/tech: Mimi, assumed care of patient.  POC discussed with patient. Call light within reach, all needs addressed at this time.       Fall risk interventions in place: Move the patient closer to the nurse's station, Patient's personal possessions are with in their safe reach, and Place socks on patient (all applicable per [place] Fall risk assessment)   Continuous monitoring: Cardiac Leads, Pulse Ox, or Blood Pressure  IVF/IV medications: Not Applicable   Oxygen: Room Air  Bedside sitter: Not Applicable   Isolation: Not Applicable     Patient ambulated with steady gait to restroom using her fww.

## 2025-01-17 NOTE — ED NOTES
Patient to and from the restroom with a front wheel walker, fresh linens provided. No distress noted.

## 2025-01-17 NOTE — ED NOTES
.Pt transferred out of ED at this time with TROY Timmons via monique. Pt is A&Ox4, with stable vital signs and no apparent distress upon transfer. Pt transferred on tele monitor. All paperwork and personal belongings sent with pt to T2- hall/02.

## 2025-01-17 NOTE — ED NOTES
Bedside report to TROY Wallace. Plan of care discussed with patient. Bed locked and in lowest position. Call light available and within reach. Appropriate fall precautions in place. No distress noted. This RN removed from care.

## 2025-01-17 NOTE — ASSESSMENT & PLAN NOTE
"Reportedly the patient was started on warfarin for anticoagulation related to atrial fibrillation and ischemic stroke in 2009; at that time and since that time patient is convinced that she is \"stuck\" with this medication. Has not used eliquis/xarelto, and this may be appropriate for this patient. She is checking her INR [id number] at home.  -pharmacy to dose warfarin, see stroke  -continue diltiazem  -consider transition to DOAC, will defer to day team  "

## 2025-01-17 NOTE — ASSESSMENT & PLAN NOTE
Rechecked lipids as a part of stroke workup, LDL not at goal  -increased atorvastatin to high dose 40mg daily

## 2025-01-17 NOTE — ASSESSMENT & PLAN NOTE
BP elevated, allowing some permissive HTN currently  -continue carvedilol, chlorthalidone, losartan as home meds  -holding clonidine  -recommend outpatient follow up for strict BP control

## 2025-01-17 NOTE — ED TRIAGE NOTES
"Chief Complaint   Patient presents with    Blurred Vision     Patient reports starting last week she began experiencing black vision in her L eye.     Sent by MD     Patient reports she was seen at [facility] today where imaging was performed and told she had a L retinal arterial occlusion. Patient sent for further work up.        75 yo female to triage for above complaint. Patient reports history of CVA in 2009. Patient taking blood thinners.     Pt is alert and oriented, speaking in full sentences, follows commands and responds appropriately to questions.     Patient placed back in lobby and educated on triage process. Asked to inform RN of any changes.    BP (!) 179/96   Pulse 78   Temp 36.6 °C (97.8 °F) (Temporal)   Resp 16   Ht 1.651 m (5' 5\")   Wt 87.8 kg (193 lb 9 oz)   SpO2 94%   BMI 32.21 kg/m²     "

## 2025-01-17 NOTE — ED NOTES
Patient back to bed, hooked up to monitor. Resting comfortably with no further needs at this time.

## 2025-01-17 NOTE — ASSESSMENT & PLAN NOTE
Presented with 2 days of L vision loss that has progressively improved, seen by ophtho outpatient and told she has CRAO; however, unable to view these records from [facility]. Suspect this is consistent with BRAO rather than CRAO but will defer to further ophthalmologic eval. Etiology likely atherosclerotic, low suspicion for cardioembolic phenomena and GCA given normal ESR/CRP. Will obtain TTE bubble for cardioembolic eval though less likely given age. CTA head/neck not concerning for need for CEA. Discussed with Dr. Coley from [facility] for recs on further imaging or other opho recs, no other recs at this time other than stroke workup and follow up with outpatient ophtho.  -follow up outpatient with opho  -MRI brain  -TTE with bubble  -neuro checks  -tele  -s/p ASA 325mg in ER  -daily ASA

## 2025-01-17 NOTE — ED PROVIDER NOTES
"ED Provider Note    Scribed for Frederick Palumbo by Raymundo Sepulveda. 1/16/2025  8:54 PM    Primary care provider: MILO Aguero  Means of arrival: Private vehicle  History obtained from: Patient  History limited by: None    CHIEF COMPLAINT  Chief Complaint   Patient presents with    Blurred Vision     Patient reports starting last week she began experiencing black vision in her L eye.     Sent by MD     Patient reports she was seen at [facility] today where imaging was performed and told she had a L retinal arterial occlusion. Patient sent for further work up.      EXTERNAL RECORDS REVIEWED  Outpatient Notes show the patient was last seen by vascular medicine on 1/10/25 for anticoagulation monitoring. She takes Warfarin.    HPI/ROS  LIMITATION TO HISTORY   Select: : None  OUTSIDE HISTORIAN(S):  Significant other  was present and provided helpful and collateral history.    AGUILAR Del Valle is a 74 y.o. female with a history of stroke who presents to the Emergency Department for evaluation of a left retinal arterial occlusion onset 2 days ago. The patient reports associated blurred vision and a \"blob\" in the middle of her vision. She saw her ophthalmologist for her blurred vision today where imaging showed the occlusion, so she was sent here for further evaluation. She denies any pain in her eye. The patient reports she takes medication for her blood pressure and coumadin for her stroke and atrial fibrillation history. She tracks her INR with her last reading being 2.6. The patient has been taking coumadin since 2009. She does have some lasting deficits from her stroke including impaired gait due to her right leg not being \"not responsive\", speech problems, and left arm weakness. However, most of her function has returned aside from her right leg. The patient reports her blood pressure has been down to 135/80 recently. She believes it is so high today because she is " nervous about another stroke.     REVIEW OF SYSTEMS  As above, all other systems reviewed and are negative.   See HPI for further details.     PAST MEDICAL HISTORY   has a past medical history of Arrhythmia, Blood transfusion without reported diagnosis, Cataract, Clotting disorder (HCC), History of echocardiogram - 7/2019 normal, Hyperlipidemia, Hypertension, Ischemic cerebrovascular accident (CVA) (HCC) (05/05/2017), and Obesity (BMI 30-39.9).  SURGICAL HISTORY   has a past surgical history that includes other; cataract extraction with iol; and dental extraction(s).  SOCIAL HISTORY  Social History     Tobacco Use    Smoking status: Never    Smokeless tobacco: Never   Vaping Use    Vaping status: Never Used   Substance Use Topics    Alcohol use: Not Currently     Comment: rare wine    Drug use: Never      Social History     Substance and Sexual Activity   Drug Use Never     FAMILY HISTORY  Family History   Problem Relation Age of Onset    Heart Disease Mother     Obesity Mother     Heart Attack Father         at age of 72    Alcohol abuse Maternal Grandfather     Ovarian Cancer Paternal Grandmother         hand    Alcohol abuse Paternal Grandfather      CURRENT MEDICATIONS  Home Medications       Reviewed by Maude Márquez R.N. (Registered Nurse) on 01/16/25 at 1751  Med List Status: <None>     Medication Last Dose Status   atorvastatin (LIPITOR) 10 MG Tab  Active   Calcium Carb-Cholecalciferol (CALCIUM 500 + D3 PO)  Active   carvedilol (COREG) 25 MG Tab  Active   chlorthalidone (HYGROTON) 25 MG Tab  Active   cloNIDine (CATAPRES) 0.1 MG Tab  Active   dilTIAZem (CARDIZEM) 120 MG Tab  Active   loratadine (CLARITIN) 10 MG Tab  Active   losartan (COZAAR) 100 MG Tab  Active   Omega-3 Fatty Acids (OMEGA 3 PO)  Active   warfarin (COUMADIN) 5 MG Tab  Active                  Audit from Redirected Encounters    **Home medications have not yet been reviewed for this encounter**       ALLERGIES  Allergies   Allergen Reactions     Sulfa Drugs Rash     PHYSICAL EXAM    VITAL SIGNS:   Vitals:    01/16/25 2023 01/16/25 2024 01/16/25 2145 01/16/25 2207   BP:  (!) 224/109 (!) 240/104 (!) 185/86   Pulse: 80  85 90   Resp:   15    Temp:       TempSrc:       SpO2: 96%  93% 95%   Weight:       Height:         Vitals: My interpretation: hypertensive, not tachycardic, afebrile, not hypoxic    Reinterpretation of vitals: Persistent hypertension otherwise unremarkable    Cardiac Monitor Interpretation: The cardiac monitor revealed normal Sinus Rhythm as interpreted by me. The cardiac monitor was ordered secondary to the patient's history of hypertension and to monitor for dysrhythmia and/or tachycardia.    PE:   Gen: sitting comfortably, speaking clearly, appears in no acute distress   ENT: Mucous membranes moist, posterior pharynx clear, uvula midline, nares patent bilaterally   Eye Exam: Patient has excellent vision looking straight ahead, slight loss of vision of about 10% of the visual field and area of interest, pupils are equal round and reactive, no proptosis, no signs of intraocular pressure increase.  Neck: Supple, FROM  Pulmonary: Lungs are clear to auscultation bilaterally. No tachypnea  CV:  RRR, no murmur appreciated, pulses 2+ in both upper and lower extremities  Abdomen: soft, NT/ND; no rebound/guarding  : no CVA or suprapubic tenderness   Neuro: A&Ox4 (person, place, time, situation), speech fluent, gait steady, no focal deficits appreciated  Skin: No rash or lesions.  No pallor or jaundice.  No cyanosis.  Warm and dry.     DIAGNOSTIC STUDIES / PROCEDURES    LABS  Results for orders placed or performed during the hospital encounter of 01/16/25   EKG (NOW)    Collection Time: 01/16/25  9:32 PM   Result Value Ref Range    Report       [facility]    Test Date:  2025-01-16  Pt Name:    ANTONIETTA CHRISTENSEN               Department: ER  MRN:        [id number]                      Room:       [facility]  Gender:      Female                       Technician: [id number]  :        [id number]                   Requested By:EMEKA HAYS  Order #:    [id number]                    Reading MD: Emeka Hays    Measurements  Intervals                                Axis  Rate:       84                           P:          0  OH:         56                           QRS:        -24  QRSD:       117                          T:          15  QT:         364  QTc:        431    Interpretive Statements  Sinus rhythm  Paired ventricular premature complexes  Short OH interval  Probable left atrial enlargement  Nonspecific intraventricular conduction delay  Anterior infarct, old  Nonspecific repol abnormality, diffuse leads  Electronically Signed On 2025 21:32:44 PST by Emeka Hays     CBC WITH DIFFERENTIAL    Collection Time: 25  9:44 PM   Result Value Ref Range    WBC 7.1 4.8 - 10.8 K/uL    RBC 5.15 4.20 - 5.40 M/uL    Hemoglobin 15.8 12.0 - 16.0 g/dL    Hematocrit 47.2 (H) 37.0 - 47.0 %    MCV 91.7 81.4 - 97.8 fL    MCH 30.7 27.0 - 33.0 pg    MCHC 33.5 32.2 - 35.5 g/dL    RDW 48.3 35.9 - 50.0 fL    Platelet Count 227 164 - 446 K/uL    MPV 10.0 9.0 - 12.9 fL    Neutrophils-Polys 56.00 44.00 - 72.00 %    Lymphocytes 32.00 22.00 - 41.00 %    Monocytes 8.50 0.00 - 13.40 %    Eosinophils 2.50 0.00 - 6.90 %    Basophils 0.70 0.00 - 1.80 %    Immature Granulocytes 0.30 0.00 - 0.90 %    Nucleated RBC 0.00 0.00 - 0.20 /100 WBC    Neutrophils (Absolute) 3.97 1.82 - 7.42 K/uL    Lymphs (Absolute) 2.27 1.00 - 4.80 K/uL    Monos (Absolute) 0.60 0.00 - 0.85 K/uL    Eos (Absolute) 0.18 0.00 - 0.51 K/uL    Baso (Absolute) 0.05 0.00 - 0.12 K/uL    Immature Granulocytes (abs) 0.02 0.00 - 0.11 K/uL    NRBC (Absolute) 0.00 K/uL   Prothrombin Time    Collection Time: 25  9:44 PM   Result Value Ref Range    PT 29.6 (H) 12.0 - 14.6 sec    INR 2.79 (H) 0.87 - 1.13      All labs reviewed by me. Labs were compared to prior labs if they  were available. Significant for no leukocytosis, no anemia, INR is therapeutic at 2.79    RADIOLOGY  I have independently interpreted the diagnostic imaging associated with this visit and am waiting the final reading from the radiologist.   My preliminary interpretation is a follows: On my independent interpretation patient has no new significant cardiomegaly or focal consolidative process on CXR.     Radiologist interpretation is as follows:  DX-CHEST-PORTABLE (1 VIEW)    (Results Pending)   CT-CTA HEAD WITH & W/O-POST PROCESS    (Results Pending)   CT-CTA NECK WITH & W/O-POST PROCESSING    (Results Pending)     COURSE & MEDICAL DECISION MAKING  Nursing notes, VS, PMSFHx, labs, imaging, EKG reviewed in chart.    Heart Score: Pending    ED Observation Status? No; Patient does not meet criteria for ED Observation.     Ddx: Central retinal artery occlusion, hypertensive disease, hypertensive emergency, central retinal vein occlusion, stroke, A-fib    MDM: 8:54 PM Tonya Del Valle is a 74 y.o. female who presented with evaluation of concerns of central retinal artery occlusion that happened 2 days ago.  Patient was finally seen by the ophthalmologist and workup there demonstrated a central retinal artery occlusion and she was sent here for admission, MRI, stroke rule out, carotid evaluation and basic stroke protocol.  Interestingly patient is on Coumadin and her INR today was 2.6 when checked at home she states.  She states she has a mild loss of vision less than 10% of her visual field, no pain.  Eye Exam: Patient has excellent vision looking straight ahead, slight loss of vision of about 10% of the visual field and area of interest, pupils are equal round and reactive, no proptosis, no signs of intraocular pressure increase.  No nausea or vomiting, no headache.  Upon arrival here patient is hypertensive.  Vital signs otherwise unremarkable.  Started on 20 mg IV push of hydralazine after difficult IV was  established by nursing staff with ultrasound.  Stroke workup was ordered including CTA head and neck, chest x-ray, labs and EKG.  EKG shows sinus rhythm with without ischemic changes or arrhythmia.  Chest x-ray on my independent interpretation shows no focal consolidation or cardiomegaly.  Unfortunately there is a massive delay in imaging and labs due to high acuity in the emergency department and this patient will have to be placed on ED observation signed out to oncoming provider with plan to review imaging and labs and admit patient for further monitoring and treatment.    ADDITIONAL PROBLEM LIST AND DISPOSITION    I have discussed management of the patient with the following physicians and ANNABEL's: None    Discussion of management with other QHP or appropriate source(s): None     Barriers to care at this time, including but not limited to:  None .     Decision tools and prescription drugs considered including, but not limited to:  NIH stroke scale 0 .    FINAL IMPRESSION  1. Central retinal artery occlusion of left eye Acute   2. Visual field loss Acute      Raymundo JEREZ (Scribe), am scribing for, and in the presence of, Frederick Palumbo.    Electronically signed by: Raymundo Sepulveda (Scribe), 1/16/2025    IFrederick personally performed the services described in this documentation, as scribed by Raymundo Sepulveda in my presence, and it is both accurate and complete.    The note accurately reflects work and decisions made by me.  Frederick Palumbo  1/16/2025  9:35 PM

## 2025-01-17 NOTE — ED NOTES
MRI screening for complete. Patient resting comfortably with  at bedside. No further needs at this time.

## 2025-01-17 NOTE — ED NOTES
Bedside report received from previous shift.   Assumed patient care. Verified patient identification.  Checked on bed, connected to monitor,  with unlabored respirations. Discussed plan of care.   Vital signs is stable. Denied any new complaints.   Gurney in low position, side rail up for pt safety. Call light within reach.   No needs identified at the moment. Instructed to use call light when needed.    Contraptions: IV gauge  20 Rt FA  Alert and Oriented: x 4  Ambulatory: yes with walker  Oxygen Treatment:  None  Pending: Admission

## 2025-01-17 NOTE — PROGRESS NOTES
Inpatient Anticoagulation Service Note for 1/17/2025      Reason for Anticoagulation: Atrial Fibrillation   [id number] VASc Score: 6  HAS-BLED Score: 4    Hemoglobin Value: 15.8  Hematocrit Value: (!) 47.2  Lab Platelet Value: 227  Target INR: 2.0 to 3.0    INR from last 7 days        Date/Time INR Value    01/16/25 2144 2.79                   Dose from last 7 days        Date/Time Dose (mg)    01/17/25 0247 10                   Average Dose (mg):  (5 mg saturday; 10 mg all other days)  Significant Interactions: Antiplatelet Medications  Bridge Therapy: No  Reversal Agent Administered: Not Applicable    Comments: On warfarin for hx of afib. H/H wnl with no overt bleeding noted by nursing or providers. Patient being started on asa, will monitor for bleeding. No other significant DDI. INR therapeutic. Will resume home dosing. INR tomorrow AM.    Plan:  warfarin 10 mg, repeat INR tomorrow AM  Education Material Provided?: No    Pharmacist suggested discharge dosing: Warfarin 5 mg on Saturday; 10 mg all other days. Follow up INR within 48-72 hours of discharge.       Enmanuel Montgomery, PharmD

## 2025-01-17 NOTE — ASSESSMENT & PLAN NOTE
Incidental 5.1 RUL nodule and 4.3mm REGI module and 4.6mm LLL nodule and 4.2mm REGI nodule.  -f/u outpatient

## 2025-01-18 ENCOUNTER — PATIENT MESSAGE (OUTPATIENT)
Dept: CARDIOLOGY | Facility: MEDICAL CENTER | Age: 75
End: 2025-01-18
Payer: MEDICARE

## 2025-01-18 NOTE — PROGRESS NOTES
Discharge orders received.  Patient arrived to the discharge lounge.  PIV removed.  Instructions given, medications reviewed and general discharge education provided to patient.  Follow up appointments discussed.  Patient verbalized understanding of dc instructions and prescriptions.  Patient signed discharge instructions.  Patient verbalized understanding had all belongings with her.  Patient left with family. Wished patient a speedy recovery.

## 2025-01-18 NOTE — DISCHARGE SUMMARY
Discharge Summary    CHIEF COMPLAINT ON ADMISSION  Chief Complaint   Patient presents with    Blurred Vision     Patient reports starting last week she began experiencing black vision in her L eye.     Sent by MD     Patient reports she was seen at [facility] today where imaging was performed and told she had a L retinal arterial occlusion. Patient sent for further work up.        Reason for Admission  Sent by MD     Admission Date  1/16/2025    CODE STATUS  Full Code    HPI & HOSPITAL COURSE  For full details please refer to HPI    Patient is 74 Y F with hx of CVA 2019, paroxysmal atrial fibrillation on warfarin, hyperlipidemia, hyper tension, who was at the opthomology office complaining of left eye vision changes. Was told to present to the ED to rule out CRAO. CTA H/N showed approximately 50% stenosis of distal right vertebral artery, multiple pulmonary nodules greatest 5.1 mm. Case was discussed with Dr. Coley by the admitting provider and recommended stroke workup. MRI of the brain showed no acute process. Remote pontine infarcts. Small right petrous apex effusion   Echo showed normal EF and no regional wall motion. Patient vision is now restore back to normal. LDL 76, TSH 3.22, A1c 6.1. patient is eating and drinking. Patient is on warfarin and seeing cardiology outpatient. INR 2.79. advised to follow up with cardiology and discussed consider of DOAC. Family is at the bedside. Patient would like to go home today. Thus discharged in stable condition with close follow up with PCP and opthomologist outpatient. Return to ER if there is any concern. Discussed multiple pulmonary nodule. I offered pulmonary referral but patient states she will follow up with PCP for further care. Thus discharged patient in stable condition. Return to ER if there is any concern.         Therefore, she is discharged in good and stable condition to home with close outpatient follow-up.    The patient recovered much more  quickly than anticipated on admission.    Discharge Date  1/17    FOLLOW UP ITEMS POST DISCHARGE  PCP  Opthomoglosit     DISCHARGE DIAGNOSES  Principal Problem:    Vision loss, left eye (POA: Yes)  Active Problems:    AF (atrial fibrillation) (HCC) (POA: Yes)    HTN (hypertension) (POA: Yes)    Hyperlipidemia (POA: Yes)    Prediabetes (POA: Yes)    Allergies (POA: Unknown)    Pulmonary nodule (POA: Unknown)  Resolved Problems:    * No resolved hospital problems. *      FOLLOW UP  Future Appointments   Date Time Provider Department Center   3/26/2025 10:20 AM MILO Aguero   6/24/2025  9:45 AM JAREK Mcmanus None     No follow-up provider specified.    MEDICATIONS ON DISCHARGE     Medication List        START taking these medications        Instructions   aspirin 81 MG Chew chewable tablet  Start taking on: January 18, 2025  Commonly known as: Asa   Chew 1 Tablet every day for 30 days.  Dose: 81 mg            CHANGE how you take these medications        Instructions   warfarin 5 MG Tabs  What changed: additional instructions  Commonly known as: Coumadin   Follow directions per anticoagulation clinic. 5 mg (5 mg x 1) every Wed; 10 mg (5 mg x 2) all other days            CONTINUE taking these medications        Instructions   atorvastatin 10 MG Tabs  Commonly known as: Lipitor   Take 1 Tablet by mouth every evening.  Dose: 10 mg     Calcium 600 MG Tabs   Take 600 mg by mouth every day.  Dose: 600 mg     carvedilol 25 MG Tabs  Commonly known as: Coreg   Take 2 Tablets by mouth 2 times a day.  Dose: 50 mg     chlorthalidone 25 MG Tabs  Commonly known as: [place]   Take 1 Tablet by mouth every morning.  Dose: 25 mg     cloNIDine 0.1 MG Tabs  Commonly known as: Catapres   Take 1 Tablet by mouth 2 times a day.  Dose: 0.1 mg     dilTIAZem 120 MG Tabs  Commonly known as: Cardizem   Take 1 Tablet by mouth 2 times a day.  Dose: 120 mg     loratadine 10 MG Tabs  Commonly known as:  Claritin   Take 1 Tablet by mouth every day.  Dose: 10 mg     losartan 100 MG Tabs  Commonly known as: Cozaar   Take 1 Tablet by mouth every day.  Dose: 100 mg     OMEGA 3 PO   Take 1 Capsule by mouth every day.  Dose: 1 Capsule              Allergies  Allergies   Allergen Reactions    Sulfa Drugs Rash and Itching             DIET  Orders Placed This Encounter   Procedures    Diet Order Diet: Regular     Standing Status:   Standing     Number of Occurrences:   1     Order Specific Question:   Diet:     Answer:   Regular [1]       ACTIVITY  As tolerated.  Weight bearing as tolerated    CONSULTATIONS  Opthalmologist     PROCEDURES  none    LABORATORY  Lab Results   Component Value Date    SODIUM 141 01/16/2025    POTASSIUM 4.5 01/16/2025    CHLORIDE 103 01/16/2025    CO2 26 01/16/2025    GLUCOSE 98 01/16/2025    BUN 23 (H) 01/16/2025    CREATININE 0.82 01/16/2025        Lab Results   Component Value Date    WBC 7.1 01/16/2025    HEMOGLOBIN 15.8 01/16/2025    HEMATOCRIT 47.2 (H) 01/16/2025    PLATELETCT 227 01/16/2025        Total time of the discharge process exceeds 36 minutes.

## 2025-01-20 ENCOUNTER — PATIENT MESSAGE (OUTPATIENT)
Dept: CARDIOLOGY | Facility: MEDICAL CENTER | Age: 75
End: 2025-01-20
Payer: MEDICARE

## 2025-01-20 ENCOUNTER — PATIENT OUTREACH (OUTPATIENT)
Dept: MEDICAL GROUP | Facility: PHYSICIAN GROUP | Age: 75
End: 2025-01-20
Payer: MEDICARE

## 2025-01-22 ENCOUNTER — OFFICE VISIT (OUTPATIENT)
Dept: MEDICAL GROUP | Facility: PHYSICIAN GROUP | Age: 75
End: 2025-01-22
Payer: MEDICARE

## 2025-01-22 VITALS
SYSTOLIC BLOOD PRESSURE: 136 MMHG | HEIGHT: 65 IN | TEMPERATURE: 98.2 F | WEIGHT: 193 LBS | OXYGEN SATURATION: 93 % | BODY MASS INDEX: 32.15 KG/M2 | DIASTOLIC BLOOD PRESSURE: 76 MMHG | RESPIRATION RATE: 14 BRPM | HEART RATE: 77 BPM

## 2025-01-22 DIAGNOSIS — I10 HYPERTENSION, UNSPECIFIED TYPE: ICD-10-CM

## 2025-01-22 DIAGNOSIS — H34.12 CENTRAL RETINAL ARTERY OCCLUSION OF LEFT EYE: ICD-10-CM

## 2025-01-22 DIAGNOSIS — I70.90 ATHEROSCLEROSIS: ICD-10-CM

## 2025-01-22 DIAGNOSIS — G31.9 CEREBRAL ATROPHY (HCC): ICD-10-CM

## 2025-01-22 DIAGNOSIS — Z86.73 HISTORY OF ISCHEMIC STROKE: ICD-10-CM

## 2025-01-22 DIAGNOSIS — I48.91 ATRIAL FIBRILLATION, UNSPECIFIED TYPE (HCC): ICD-10-CM

## 2025-01-22 DIAGNOSIS — R91.1 LUNG NODULE: ICD-10-CM

## 2025-01-22 PROCEDURE — 99214 OFFICE O/P EST MOD 30 MIN: CPT

## 2025-01-22 PROCEDURE — 3078F DIAST BP <80 MM HG: CPT

## 2025-01-22 PROCEDURE — 3075F SYST BP GE 130 - 139MM HG: CPT

## 2025-01-22 RX ORDER — ASPIRIN 81 MG/1
81 TABLET, CHEWABLE ORAL DAILY
Qty: 100 TABLET | Refills: 3 | Status: SHIPPED | OUTPATIENT
Start: 2025-01-22

## 2025-01-22 ASSESSMENT — ENCOUNTER SYMPTOMS
BLURRED VISION: 1
EYE PAIN: 0

## 2025-01-22 ASSESSMENT — FIBROSIS 4 INDEX: FIB4 SCORE: 3.05

## 2025-01-22 NOTE — ASSESSMENT & PLAN NOTE
CT CHESTIMPRESSION:   1.  Approximately 50% stenosis of the distal right vertebral artery  2.  Pulmonary nodules, (Lung apices demonstrates 5.1 mm right upper lobe pulmonary nodule on image 40, 4.3 mm pulmonary nodule in the left upper lobe on image 24, 4.6 mm pulmonary nodule in the left lower lobe on image 19, and 4.2 mm left upper lobe pulmonary nodule on image 14    Plan follow up with LDCT for surveillance 12 months; no history of smoking, does have history of 2nd hand exposure; no cough, increased sob.    Pt expresses concern regarding these, and would like increased surveillance will provide referral to lung nodule clinic.

## 2025-01-22 NOTE — ASSESSMENT & PLAN NOTE
Chronic, ongoing. Was seen in ER for ocular stroke 1/16/25  Currently taking coumadin 10 mg x6 days weekly, 5 mg 1x weekly. Testing every other week.    Has started aspirin 81 mg daily, continue this  Continue carvedilol 50 BID, diltiazem 120 mg BID    Message sent to Harsha, consider alternate anticoagulation therapy given ocular stroke

## 2025-01-22 NOTE — PROGRESS NOTES
Verbal consent was acquired by the patient to use [employer] ambient listening note generation during this visit     Subjective:     CC: Diagnoses of Lung nodule, Central retinal artery occlusion of left eye, Cerebral atrophy (HCC), Atrial fibrillation, unspecified type (HCC), Hypertension, unspecified type, and History of ischemic stroke were pertinent to this visit.    HPI:   Tonya presents today with    History of Present Illness  The patient is a 74-year-old female who presents for evaluation of an eye stroke, lung nodules, and blood pressure management.    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 The event occurred between 5:00 and 5:30 PM during dinner. She reported a black spot in her vision, which expanded to nearly complete darkness with residual peripheral vision. After approximately an hour, her vision began to recover. She sought medical attention from her ophthalmologist on Thursday afternoon, who confirmed the stroke and referred her to the hospital for further evaluation. Her current visual acuity is satisfactory, with minor impairment that is compensated for by binocular vision. Post-stroke, her visual acuity was measured at 20/40, but she reports significant improvement since then. She reports no memory changes but occasionally confuses names, a symptom she attributes to her age and previous stroke in 2009. She has been on warfarin for 15 years, which was deemed ineffective by 4 physicians due to the occurrence of the clot. She has been advised to continue warfarin therapy indefinitely. She is diligent about home testing and maintains stable results. She has a follow-up appointment with Dr. Ochoa on 06/24/2024. She has an upcoming appointment with her ophthalmologist in 4 weeks. She reports no nosebleeds or abnormal bleeding but has noticed bruising from needle sticks. She takes  Coumadin 10 mg most days and 5 mg once a week on Saturdays. She tests every other Friday, allowing for dose adjustments on Saturdays if necessary. She has not been diagnosed with atrial fibrillation but has been informed of a potential predisposition based on her heart rhythm. She also takes Coreg 50 mg twice daily. She reports no pain during the ocular stroke. She has been advised to continue her current medication regimen, which has been effective in preventing a second stroke for 15 years. Her warfarin therapy was considered for modification, but it was decided to maintain the current regimen due to her consistent testing and stable results. Aspirin was added to her morning medications, and no other changes were made.    A chest x-ray revealed 3 small lung nodules, necessitating a follow-up program. She was advised to undergo a secondary test in 2 months, but she prefers to schedule this before her March appointment. She has a history of exposure to secondhand smoke from her first , a heavy smoker, but has been  to a non-smoker for the past 41 years.    Her blood pressure was elevated during her hospital stay, but it was brought under control with medication. She has been advised to have a follow-up x-ray every 2 months. She has received 2 pneumonia vaccines, one in [place] and another in [place].    SOCIAL HISTORY  She does not smoke.    MEDICATIONS  Current: warfarin, aspirin, Coreg, diltiazem, clonidine    IMMUNIZATIONS  She has received 2 pneumonia vaccines, one in [place] and one in [place].    Current Outpatient Medications   Medication Sig Dispense Refill    aspirin (ASA) 81 MG Chew Tab chewable tablet Chew 1 Tablet every day. 100 Tablet 3    Calcium 600 MG Tab Take 600 mg by mouth every day.      carvedilol (COREG) 25 MG Tab Take 2 Tablets by mouth 2 times a day. 360 Tablet 3    atorvastatin (LIPITOR) 10 MG Tab Take 1 Tablet by mouth every evening. 100 Tablet 3    chlorthalidone (HYGROTON) 25 MG  "Tab Take 1 Tablet by mouth every morning. 100 Tablet 3    cloNIDine (CATAPRES) 0.1 MG Tab Take 1 Tablet by mouth 2 times a day. 200 Tablet 3    dilTIAZem (CARDIZEM) 120 MG Tab Take 1 Tablet by mouth 2 times a day. 200 Tablet 3    loratadine (CLARITIN) 10 MG Tab Take 1 Tablet by mouth every day. 100 Tablet 3    losartan (COZAAR) 100 MG Tab Take 1 Tablet by mouth every day. 100 Tablet 3    warfarin (COUMADIN) 5 MG Tab Follow directions per anticoagulation clinic. 5 mg (5 mg x 1) every Wed; 10 mg (5 mg x 2) all other days (Patient taking differently: 5 mg on Saturday, 10 mg all other days) 200 Tablet 3    Omega-3 Fatty Acids (OMEGA 3 PO) Take 1 Capsule by mouth every day.       No current facility-administered medications for this visit.       Problem   Cerebral Atrophy (Hcc)   Vision loss, left eye   Lung Nodule   Htn (Hypertension)   Af (Atrial Fibrillation) (Hcc)     ROS:  Review of Systems   Eyes:  Positive for blurred vision. Negative for pain.   All other systems reviewed and are negative.      Objective:     Exam:  /76 (BP Location: Left arm, Patient Position: Sitting, BP Cuff Size: Adult)   Pulse 77   Temp 36.8 °C (98.2 °F) (Temporal)   Resp 14   Ht 1.651 m (5' 5\")   Wt 87.5 kg (193 lb)   SpO2 93%   BMI 32.12 kg/m²  Body mass index is 32.12 kg/m².    Physical Exam  Vitals reviewed.   Constitutional:       General: She is not in acute distress.     Appearance: Normal appearance. She is not ill-appearing.   HENT:      Head: Normocephalic and atraumatic.   Cardiovascular:      Rate and Rhythm: Normal rate.      Pulses: Normal pulses.   Pulmonary:      Effort: Pulmonary effort is normal. No respiratory distress.   Skin:     General: Skin is warm and dry.      Findings: No rash.   Neurological:      General: No focal deficit present.      Mental Status: She is alert and oriented to person, place, and time.      Gait: Gait abnormal (walker).   Psychiatric:         Mood and Affect: Mood normal.         " Behavior: Behavior normal.         Labs:    Latest Reference Range & Units 01/16/25 21:44 01/16/25 22:04 01/17/25 00:16 01/17/25 00:17 01/17/25 00:26 01/17/25 07:51 01/17/25 14:01   WBC 4.8 - 10.8 K/uL 7.1         RBC 4.20 - 5.40 M/uL 5.15         Hemoglobin 12.0 - 16.0 g/dL 15.8         Hematocrit 37.0 - 47.0 % 47.2 (H)         MCV 81.4 - 97.8 fL 91.7         MCH 27.0 - 33.0 pg 30.7         MCHC 32.2 - 35.5 g/dL 33.5         RDW 35.9 - 50.0 fL 48.3         Platelet Count 164 - 446 K/uL 227         MPV 9.0 - 12.9 fL 10.0         Neutrophils-Polys 44.00 - 72.00 % 56.00         Neutrophils (Absolute) 1.82 - 7.42 K/uL 3.97         Lymphocytes 22.00 - 41.00 % 32.00         Lymphs (Absolute) 1.00 - 4.80 K/uL 2.27         Monocytes 0.00 - 13.40 % 8.50         Monos (Absolute) 0.00 - 0.85 K/uL 0.60         Eosinophils 0.00 - 6.90 % 2.50         Eos (Absolute) 0.00 - 0.51 K/uL 0.18         Basophils 0.00 - 1.80 % 0.70         Baso (Absolute) 0.00 - 0.12 K/uL 0.05         Immature Granulocytes 0.00 - 0.90 % 0.30         Immature Granulocytes (abs) 0.00 - 0.11 K/uL 0.02         Nucleated RBC 0.00 - 0.20 /100 WBC 0.00         NRBC (Absolute) K/uL 0.00         Sed Rate Westergren 0 - 25 mm/hour 6         Sodium 135 - 145 mmol/L 141         Potassium 3.6 - 5.5 mmol/L 4.5         Chloride 96 - 112 mmol/L 103         Co2 20 - 33 mmol/L 26         Anion Gap 7.0 - 16.0  12.0         Glucose 65 - 99 mg/dL 98         Bun 8 - 22 mg/dL 23 (H)         Creatinine 0.50 - 1.40 mg/dL 0.82         GFR (CKD-EPI) >60 mL/min/1.73 m 2 74         Calcium 8.5 - 10.5 mg/dL 10.0         Correct Calcium 8.5 - 10.5 mg/dL 9.8         AST(SGOT) 12 - 45 U/L 38         ALT(SGPT) 2 - 50 U/L 17         Alkaline Phosphatase 30 - 99 U/L 62         Total Bilirubin 0.1 - 1.5 mg/dL 0.7         Albumin 3.2 - 4.9 g/dL 4.2         Total Protein 6.0 - 8.2 g/dL 7.0         Globulin 1.9 - 3.5 g/dL 2.8         A-G Ratio g/dL 1.5         Glycohemoglobin 4.0 - 5.6 % 6.1  (H)         Estim. Avg Glu mg/dL 128         Cholesterol,Tot 100 - 199 mg/dL     143     Triglycerides 0 - 149 mg/dL     88     HDL >=40 mg/dL     49     LDL <100 mg/dL     76     Troponin T 6 - 19 ng/L     15     INR 0.87 - 1.13  2.79 (H)         PT 12.0 - 14.6 sec 29.6 (H)         TSH 0.380 - 5.330 uIU/mL     3.220     Stat C-Reactive Protein 0.00 - 0.75 mg/dL <0.30         CT-CTA HEAD WITH & W/O-POST PROCESS    Rpt       CT-CTA NECK WITH & W/O-POST PROCESSING     Rpt      DX-CHEST-PORTABLE (1 VIEW)   Rpt        MR-BRAIN-W/O       Rpt    Left Ventrical Ejection Fraction        70   EC-ECHOCARDIOGRAM COMPLETE W/O CONT        Rpt   (H): Data is abnormally high  Rpt: View report in Results Review for more information    Assessment & Plan:     74 y.o. female with the following -     Assessment & Plan  1. Ocular Stroke.  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 Her current visual acuity is satisfactory, with minor impairment that is compensated for by binocular vision. Post-stroke, her visual acuity was measured at 20/40, but she reports significant improvement since then. She has been on warfarin for 15 years, which was deemed ineffective by 4 physicians due to the occurrence of the clot. She has been advised to continue warfarin therapy indefinitely. She is diligent about home testing and maintains stable results. She has a follow-up appointment with Dr. Ochoa on 06/24/2024. She has an upcoming appointment with her ophthalmologist in 4 weeks. She takes Coumadin 10 mg most days and 5 mg once a week on Saturdays. She tests every other Friday, allowing for dose adjustments on Saturdays if necessary. She has not been diagnosed with atrial fibrillation but has been informed of a potential predisposition based on her heart rhythm. She also takes Coreg 50 mg twice daily. She has been advised to continue her  current medication regimen, which has been effective in preventing a second stroke for 15 years. She will continue her current regimen of aspirin 81 mg and Coumadin. A consultation with the cardiologist will be arranged to discuss potential modifications to her treatment plan. A message has been sent to Dr. Sinclair regarding her case. The pharmacist responsible for managing her Coumadin therapy has also been contacted.    2. Pulmonary Nodules.  A chest x-ray revealed 3 small lung nodules, necessitating a follow-up program. She was advised to undergo a secondary test in 2 months, but she prefers to schedule this before her March appointment. She has a history of exposure to secondhand smoke from her first , a heavy smoker, but has been  to a non-smoker for the past 41 years. A referral to the lung nodule clinic will be made to seek their recommendations and determine if an earlier appointment is necessary.    3. Blood pressure management.  Her blood pressure was elevated during her hospital stay, but it was brought under control with medication. She has been advised to have a follow-up x-ray every 2 months. She has received 2 pneumonia vaccines, one in [place] and another in [place].    Follow-up  The patient is scheduled for a follow-up visit on 03/26/2024.    Problem List Items Addressed This Visit       History of ischemic stroke    Relevant Medications    aspirin (ASA) 81 MG Chew Tab chewable tablet    AF (atrial fibrillation) (HCC)     Chronic, ongoing. Was seen in ER for ocular stroke 1/16/25  Currently taking coumadin 10 mg x6 days weekly, 5 mg 1x weekly. Testing every other week.    Has started aspirin 81 mg daily, continue this  Continue carvedilol 50 BID, diltiazem 120 mg BID    Message sent to Harsha, consider alternate anticoagulation therapy given ocular stroke         HTN (hypertension)     Chronic, stable. Bp in clinic 136/76. Taking losartan 100 mg daily, catapres 0.1 mg BID, chlorthalidone 25  mg daily         Vision loss, left eye     Reports vision has returned, has black spot in visual field s/p stroke         Lung nodule     CT CHESTIMPRESSION:   1.  Approximately 50% stenosis of the distal right vertebral artery  2.  Pulmonary nodules, (Lung apices demonstrates 5.1 mm right upper lobe pulmonary nodule on image 40, 4.3 mm pulmonary nodule in the left upper lobe on image 24, 4.6 mm pulmonary nodule in the left lower lobe on image 19, and 4.2 mm left upper lobe pulmonary nodule on image 14    Plan follow up with LDCT for surveillance 12 months; no history of smoking, does have history of 2nd hand exposure; no cough, increased sob.    Pt expresses concern regarding these, and would like increased surveillance will provide referral to lung nodule clinic.         Relevant Orders    Referral to Pulmonary and Sleep Medicine    Cerebral atrophy (HCC)     MRI 1/17/25 IMPRESSION:   No acute process.   Age-related volume loss and chronic microvascular ischemic changes.   Remote pontine infarcts.   Small right petrous apex effusion.    Continue healthy lifestyle recommendations.           Patient was educated in proper administration of medication(s) ordered today including safety, possible SE, risks, benefits, rationale and alternatives to therapy.   Supportive care, differential diagnoses, and indications for immediate follow-up discussed with patient.    Pathogenesis of diagnosis discussed including typical length and natural progression.    Instructed to return to clinic or nearest emergency department for any change in condition, further concerns, or worsening of symptoms.  Patient states understanding of the plan of care and discharge instructions.    Return in about 9 weeks (around 3/26/2025).    Please note that this dictation was created using voice recognition software. I have made every reasonable attempt to correct obvious errors, but I expect that there are errors of grammar and possibly content that  I did not discover before finalizing the note.

## 2025-01-22 NOTE — ASSESSMENT & PLAN NOTE
Chronic, stable. Bp in clinic 136/76. Taking losartan 100 mg daily, catapres 0.1 mg BID, chlorthalidone 25 mg daily

## 2025-01-22 NOTE — ASSESSMENT & PLAN NOTE
MRI 1/17/25 IMPRESSION:   No acute process.   Age-related volume loss and chronic microvascular ischemic changes.   Remote pontine infarcts.   Small right petrous apex effusion.    Continue healthy lifestyle recommendations.

## 2025-01-23 ENCOUNTER — DOCUMENTATION (OUTPATIENT)
Dept: MEDICAL GROUP | Facility: PHYSICIAN GROUP | Age: 75
End: 2025-01-23
Payer: MEDICARE

## 2025-01-24 ENCOUNTER — ANTICOAGULATION MONITORING (OUTPATIENT)
Dept: VASCULAR LAB | Facility: MEDICAL CENTER | Age: 75
End: 2025-01-24
Payer: MEDICARE

## 2025-01-24 DIAGNOSIS — H34.12 CENTRAL RETINAL ARTERY OCCLUSION OF LEFT EYE: ICD-10-CM

## 2025-01-24 DIAGNOSIS — I48.91 ATRIAL FIBRILLATION, UNSPECIFIED TYPE (HCC): ICD-10-CM

## 2025-01-24 DIAGNOSIS — Z86.73 HISTORY OF ISCHEMIC STROKE: ICD-10-CM

## 2025-01-24 DIAGNOSIS — Z79.01 LONG TERM (CURRENT) USE OF ANTICOAGULANTS: ICD-10-CM

## 2025-01-24 LAB — INR PPP: 3.3 (ref 2–3.5)

## 2025-01-24 NOTE — PROGRESS NOTES
OP   Telephone Anticoagulation Service Note      Anticoagulation Summary  As of 1/24/2025      INR goal:  2.0-3.0   TTR:  85.2% (4.5 y)   INR used for dosing:  3.30 (1/24/2025)   Warfarin maintenance plan:  5 mg (5 mg x 1) every Sat; 10 mg (5 mg x 2) all other days   Weekly warfarin total:  65 mg   Plan last modified:  KENDRA JudgePLILLIAN (7/15/2024)   Next INR check:  1/31/2025   Target end date:  Indefinite    Indications    AF (atrial fibrillation) ([facility]) [I48.91]  Long term (current) use of anticoagulants [Z79.01] [Z79.01]  History of ischemic stroke [Z86.73]                 Anticoagulation Episode Summary       INR check location:  Anticoagulation Clinic    Preferred lab:  --    Send INR reminders to:  --    Comments:  HM  Only call if out of range          Anticoagulation Care Providers       Provider Role Specialty Phone number    MILO Padilla  Nurse Practitioner Family [phone]          Anticoagulation Patient Findings  Patient Findings       Positives:  Emergency department visit (pt found to have L retinal arterial occusion and was started on ASA), Change in medications (pt started on ASA 81mg QD for stroke in eye)    Negatives:  Signs/symptoms of thrombosis, Signs/symptoms of bleeding, Laboratory test error suspected, Change in health, Change in alcohol use, Change in activity, Upcoming invasive procedure, Upcoming dental procedure, Missed doses, Extra doses, Change in diet/appetite, Hospital admission, Bruising, Other complaints          Spoke with the patient on the phone today, reporting a SUPRA-therapeutic INR of 3.3.  Confirmed the current warfarin dosing regimen and patient compliance.  Patient reports she had an ED visit last week as she was having vision changes and was found to have a L retinal arterial occlusion. She was started on ASA 81mg QD one week ago.   Patient denies any interval changes to diet.   Patient denies any signs/symptoms of bleeding or clotting.  Patient  instructed to reduce warfarin dose to 7.5mg TONIGHT, then to continue with her current dosing regimen. Patient asked to follow up again in 1 week and overall weekly adjustment will be made at that time if still necessary.      Lei SteinbergD

## 2025-01-30 ENCOUNTER — ANTICOAGULATION MONITORING (OUTPATIENT)
Dept: CARDIOLOGY | Facility: MEDICAL CENTER | Age: 75
End: 2025-01-30
Payer: MEDICARE

## 2025-01-30 DIAGNOSIS — I48.91 ATRIAL FIBRILLATION, UNSPECIFIED TYPE (HCC): ICD-10-CM

## 2025-01-30 DIAGNOSIS — Z79.01 LONG TERM (CURRENT) USE OF ANTICOAGULANTS: ICD-10-CM

## 2025-01-30 DIAGNOSIS — Z86.73 HISTORY OF ISCHEMIC STROKE: ICD-10-CM

## 2025-01-30 LAB — INR PPP: 2.4 (ref 2–3.5)

## 2025-01-30 NOTE — PROGRESS NOTES
Anticoagulation Summary  As of 2025      INR goal:  2.0-3.0   TTR:  85.1% (4.5 y)   INR used for dosin.40 (2025)   Warfarin maintenance plan:  5 mg (5 mg x 1) every Sat; 10 mg (5 mg x 2) all other days   Weekly warfarin total:  65 mg   Plan last modified:  Hyacinth Bliss A.P.NKimmy (7/15/2024)   Next INR check:  2025   Target end date:  Indefinite    Indications    AF (atrial fibrillation) (HCC) [I48.91]  Long term (current) use of anticoagulants [Z79.01] [Z79.01]  History of ischemic stroke [Z86.73]                 Anticoagulation Episode Summary       INR check location:  Anticoagulation Clinic    Preferred lab:  --    Send INR reminders to:  --    Comments:    Only call if out of range          Anticoagulation Care Providers       Provider Role Specialty Phone number    NATALI PadillaRKimmyN.  Nurse Practitioner Family [phone]            Refer to Anticoagulation Patient Findings for HPI    INR therapeutic at 2.4.      Per chart review, pt prefers no phone call if INR in range.    Pt to continue with current warfarin dosing regimen.     Will follow up in 1 week(s).     Renny Christina, PharmD, BCACP

## 2025-01-31 ENCOUNTER — OFFICE VISIT (OUTPATIENT)
Dept: CARDIOLOGY | Facility: MEDICAL CENTER | Age: 75
End: 2025-01-31
Attending: PHYSICIAN ASSISTANT
Payer: MEDICARE

## 2025-01-31 VITALS
WEIGHT: 194.6 LBS | BODY MASS INDEX: 32.42 KG/M2 | DIASTOLIC BLOOD PRESSURE: 70 MMHG | RESPIRATION RATE: 18 BRPM | HEART RATE: 70 BPM | OXYGEN SATURATION: 94 % | HEIGHT: 65 IN | SYSTOLIC BLOOD PRESSURE: 138 MMHG

## 2025-01-31 DIAGNOSIS — I10 ESSENTIAL HYPERTENSION: ICD-10-CM

## 2025-01-31 DIAGNOSIS — R42 DISEQUILIBRIUM: ICD-10-CM

## 2025-01-31 DIAGNOSIS — Z79.01 LONG TERM (CURRENT) USE OF ANTICOAGULANTS: ICD-10-CM

## 2025-01-31 DIAGNOSIS — E78.5 DYSLIPIDEMIA: ICD-10-CM

## 2025-01-31 DIAGNOSIS — I48.0 PAROXYSMAL ATRIAL FIBRILLATION (HCC): ICD-10-CM

## 2025-01-31 DIAGNOSIS — Z86.73 HISTORY OF ISCHEMIC STROKE: ICD-10-CM

## 2025-01-31 DIAGNOSIS — I48.91 ATRIAL FIBRILLATION, UNSPECIFIED TYPE (HCC): ICD-10-CM

## 2025-01-31 DIAGNOSIS — D68.69 HYPERCOAGULABLE STATE DUE TO PAROXYSMAL ATRIAL FIBRILLATION (HCC): ICD-10-CM

## 2025-01-31 DIAGNOSIS — I48.0 HYPERCOAGULABLE STATE DUE TO PAROXYSMAL ATRIAL FIBRILLATION (HCC): ICD-10-CM

## 2025-01-31 DIAGNOSIS — H34.9 RETINAL ARTERY OCCLUSION: ICD-10-CM

## 2025-01-31 PROCEDURE — 99213 OFFICE O/P EST LOW 20 MIN: CPT | Performed by: PHYSICIAN ASSISTANT

## 2025-01-31 RX ORDER — ATORVASTATIN CALCIUM 20 MG/1
20 TABLET, FILM COATED ORAL NIGHTLY
Qty: 100 TABLET | Refills: 3 | Status: SHIPPED | OUTPATIENT
Start: 2025-01-31 | End: 2026-03-07

## 2025-01-31 ASSESSMENT — ENCOUNTER SYMPTOMS
CLAUDICATION: 0
CHILLS: 0
VOMITING: 0
FEVER: 0
ORTHOPNEA: 0
WEAKNESS: 1
GASTROINTESTINAL NEGATIVE: 1
HEADACHES: 0
SHORTNESS OF BREATH: 0
NAUSEA: 0
CONSTITUTIONAL NEGATIVE: 1
PALPITATIONS: 0
PND: 0
BRUISES/BLEEDS EASILY: 0
MYALGIAS: 0
DIZZINESS: 0
DOUBLE VISION: 0
LOSS OF CONSCIOUSNESS: 0
PSYCHIATRIC NEGATIVE: 1
ABDOMINAL PAIN: 0
MUSCULOSKELETAL NEGATIVE: 1
COUGH: 0
BLURRED VISION: 0

## 2025-01-31 ASSESSMENT — FIBROSIS 4 INDEX: FIB4 SCORE: 3.05

## 2025-01-31 NOTE — PATIENT INSTRUCTIONS
[facility]       [address] [facility], [address]  [place] [address]  [phone]           [address] [facility] [address]  [place] [address]  [phone]     Look into the watchman.

## 2025-01-31 NOTE — PROGRESS NOTES
Chief Complaint   Patient presents with    Atrial Fibrillation     F/V Dx: Paroxysmal atrial fibrillation (HCC)        Hyperlipidemia       Subjective     Eulalia Del Valle is a 74 y.o. female with history of paroxysmal atrial fibrillation, ischemic CVA, hypertension and dyslipidemia who presents today for hospital follow-up.    Her primary cardiologist is Dr. Mcleod.  She was last seen 11/15/2024 by Carlie CONTRERAS.  At that exam, she was doing well overall.  She did not have any cardiac symptoms, but did continue to have stroke deficits with balance and equilibrium.  Her cardiac conditions were well managed and her medications were continued at their previous doses.  She was to follow-up in 6 months.  1/16/2025 she presented to the emergency department after being seen at [facility] and told she had a left retinal arterial occlusion.  A CTA of her head and neck was performed which demonstrated approximately 50% stenosis of the distal right vertebral artery and multiple pulmonary nodules.  She underwent stroke workup including MRI of the brain which did not demonstrate any acute process and only noted remote pontine infarcts.  Her vision slowly resolved with only a small area of residual deficit.  She was recommended to continue warfarin and follow-up with cardiology to consider DOAC.    Today, she reports she is doing well overall.  She continues to have some weakness from her prior strokes but this is at baseline.She does have a small residual deficit in her eye but has gotten most of her vision back. No chest pain or palpitations. No shortness of breath, dyspnea on exertion, orthopnea or PND. No lower extremity edema. No dizziness or lightheadedness. No syncope or presyncope.      Past Medical History:   Diagnosis Date    Arrhythmia     Blood transfusion without reported diagnosis     Cataract     Clotting disorder (HCC)     History of echocardiogram - 7/2019 normal     Hyperlipidemia      Hypertension     Ischemic cerebrovascular accident (CVA) ([facility]) 05/05/2017    Obesity (BMI 30-39.9)      Past Surgical History:   Procedure Laterality Date    CATARACT EXTRACTION WITH IOL      DENTAL EXTRACTION(S)      OTHER      artery repair in 1970 related to childbirth     Family History   Problem Relation Age of Onset    Heart Disease Mother     Obesity Mother     Heart Attack Father         at age of 72    Alcohol abuse Maternal Grandfather     Ovarian Cancer Paternal Grandmother         hand    Alcohol abuse Paternal Grandfather      Social History     Socioeconomic History    Marital status:      Spouse name: Not on file    Number of children: Not on file    Years of education: Not on file    Highest education level: 12th grade   Occupational History    Not on file   Tobacco Use    Smoking status: Never    Smokeless tobacco: Never   Vaping Use    Vaping status: Never Used   Substance and Sexual Activity    Alcohol use: Not Currently     Comment: rare wine    Drug use: Never    Sexual activity: Not Currently     Partners: Male     Comment:     Other Topics Concern    Not on file   Social History Narrative    Not on file     Social Drivers of Health     Financial Resource Strain: Low Risk  (9/24/2024)    Overall Financial Resource Strain (CARDIA)     Difficulty of Paying Living Expenses: Not hard at all   Food Insecurity: No Food Insecurity (1/17/2025)    Hunger Vital Sign     Worried About Running Out of Food in the Last Year: Never true     Ran Out of Food in the Last Year: Never true   Transportation Needs: No Transportation Needs (1/17/2025)    PRAPARE - Transportation     Lack of Transportation (Medical): No     Lack of Transportation (Non-Medical): No   Physical Activity: Insufficiently Active (9/24/2024)    Exercise Vital Sign     Days of Exercise per Week: 1 day     Minutes of Exercise per Session: 10 min   Stress: No Stress Concern Present (9/24/2024)    [employer] - Occupational Stress Questionnaire     Feeling of Stress : Not at all   Social Connections: Moderately Integrated (9/24/2024)    Social Connection and Isolation Panel [NHANES]     Frequency of Communication with Friends and Family: Twice a week     Frequency of Social Gatherings with Friends and Family: Once a week     Attends Congregational Services: More than 4 times per year     Active Member of Clubs or Organizations: No     Attends Club or Organization Meetings: Never     Marital Status:    Intimate Partner Violence: Not At Risk (1/17/2025)    Humiliation, Afraid, Rape, and Kick questionnaire     Fear of Current or Ex-Partner: No     Emotionally Abused: No     Physically Abused: No     Sexually Abused: No   Housing Stability: Low Risk  (1/17/2025)    Housing Stability Vital Sign     Unable to Pay for Housing in the Last Year: No     Number of Times Moved in the Last Year: 0     Homeless in the Last Year: No     Allergies   Allergen Reactions    Sulfa Drugs Rash and Itching           Outpatient Encounter Medications as of 1/31/2025   Medication Sig Dispense Refill    VITAMIN D PO Take 3,000 mg by mouth every day.      atorvastatin (LIPITOR) 20 MG Tab Take 1 Tablet by mouth every evening. 100 Tablet 3    aspirin (ASA) 81 MG Chew Tab chewable tablet Chew 1 Tablet every day. 100 Tablet 3    Calcium 600 MG Tab Take 600 mg by mouth every day.      carvedilol (COREG) 25 MG Tab Take 2 Tablets by mouth 2 times a day. 360 Tablet 3    chlorthalidone (HYGROTON) 25 MG Tab Take 1 Tablet by mouth every morning. 100 Tablet 3    cloNIDine (CATAPRES) 0.1 MG Tab Take 1 Tablet by mouth 2 times a day. 200 Tablet 3    dilTIAZem (CARDIZEM) 120 MG Tab Take 1 Tablet by mouth 2 times a day. 200 Tablet 3    loratadine (CLARITIN) 10 MG Tab Take 1 Tablet by mouth every day. 100 Tablet 3    losartan (COZAAR) 100 MG Tab Take 1 Tablet by mouth every day. 100 Tablet 3    warfarin (COUMADIN) 5 MG Tab Follow directions per  "anticoagulation clinic. 5 mg (5 mg x 1) every Wed; 10 mg (5 mg x 2) all other days (Patient taking differently: 5 mg on Saturday, 10 mg all other days) 200 Tablet 3    Omega-3 Fatty Acids (OMEGA 3 PO) Take 1 Capsule by mouth every day.      [DISCONTINUED] atorvastatin (LIPITOR) 10 MG Tab Take 1 Tablet by mouth every evening. 100 Tablet 3     No facility-administered encounter medications on file as of 1/31/2025.     Review of Systems   Constitutional: Negative.  Negative for chills, fever and malaise/fatigue.   HENT: Negative.     Eyes:  Negative for blurred vision and double vision.        Small area of blurry vision in the left eye   Respiratory:  Negative for cough and shortness of breath.    Cardiovascular:  Negative for chest pain, palpitations, orthopnea, claudication, leg swelling and PND.   Gastrointestinal: Negative.  Negative for abdominal pain, nausea and vomiting.   Genitourinary: Negative.    Musculoskeletal: Negative.  Negative for myalgias.   Skin: Negative.  Negative for rash.   Neurological:  Positive for weakness. Negative for dizziness, loss of consciousness and headaches.   Endo/Heme/Allergies: Negative.  Does not bruise/bleed easily.   Psychiatric/Behavioral: Negative.                Objective     /70 (BP Location: Left arm, Patient Position: Sitting, BP Cuff Size: Adult)   Pulse 70   Resp 18   Ht 1.651 m (5' 5\")   Wt 88.3 kg (194 lb 9.6 oz)   SpO2 94%   BMI 32.38 kg/m²     Physical Exam  Vitals reviewed.   Constitutional:       General: She is not in acute distress.     Appearance: Normal appearance.   HENT:      Head: Normocephalic and atraumatic.      Right Ear: External ear normal.      Left Ear: External ear normal.   Eyes:      General: No scleral icterus.     Extraocular Movements: Extraocular movements intact.      Conjunctiva/sclera: Conjunctivae normal.      Pupils: Pupils are equal, round, and reactive to light.   Cardiovascular:      Rate and Rhythm: Normal rate and " regular rhythm.      Pulses: Normal pulses.      Heart sounds: Normal heart sounds. No murmur heard.     No friction rub. No gallop.   Pulmonary:      Effort: Pulmonary effort is normal.      Breath sounds: Normal breath sounds.   Abdominal:      General: Bowel sounds are normal.      Palpations: Abdomen is soft.      Tenderness: There is no abdominal tenderness.   Musculoskeletal:         General: Normal range of motion.      Cervical back: Normal range of motion and neck supple.      Right lower leg: No edema.      Left lower leg: No edema.   Skin:     General: Skin is warm and dry.      Capillary Refill: Capillary refill takes less than 2 seconds.   Neurological:      General: No focal deficit present.      Mental Status: She is alert and oriented to person, place, and time.   Psychiatric:         Mood and Affect: Mood normal.         Behavior: Behavior normal.         Judgment: Judgment normal.       Lab Results   Component Value Date/Time    CHOLSTRLTOT 143 01/17/2025 12:26 AM    LDL 76 01/17/2025 12:26 AM    HDL 49 01/17/2025 12:26 AM    TRIGLYCERIDE 88 01/17/2025 12:26 AM       Lab Results   Component Value Date/Time    SODIUM 141 01/16/2025 09:44 PM    POTASSIUM 4.5 01/16/2025 09:44 PM    CHLORIDE 103 01/16/2025 09:44 PM    CO2 26 01/16/2025 09:44 PM    GLUCOSE 98 01/16/2025 09:44 PM    BUN 23 (H) 01/16/2025 09:44 PM    CREATININE 0.82 01/16/2025 09:44 PM     Lab Results   Component Value Date/Time    ALKPHOSPHAT 62 01/16/2025 09:44 PM    ASTSGOT 38 01/16/2025 09:44 PM    ALTSGPT 17 01/16/2025 09:44 PM    TBILIRUBIN 0.7 01/16/2025 09:44 PM      Cardiovascular imaging and procedures:    TTE (1/17/2025):  CONCLUSIONS  Compared to the prior study on 10/27/23, no changes.   Normal left ventricular systolic function.   Aneurysmal interatrial septum. Agitated saline (bubble) study was   performed with and without Valsalva maneuver. No evidence of right to   left shunt by agitated saline study.  No evidence of  valvular abnormality based on Doppler evaluation.     TTE (10/27/2023):  Normal left ventricular systolic function. The left ventricular   ejection fraction is visually estimated to be 60%.   Grade I diastolic dysfunction.   The right ventricle is normal in size and systolic function.   No significant valvular abnormalities.   No prior study is available for comparison.          Assessment & Plan     1. Dyslipidemia  atorvastatin (LIPITOR) 20 MG Tab      2. History of ischemic stroke        3. Atrial fibrillation, unspecified type (HCC)        4. Paroxysmal atrial fibrillation (HCC)        5. Essential hypertension        6. Disequilibrium        7. Long term (current) use of anticoagulants [Z79.01]        8. Hypercoagulable state due to paroxysmal atrial fibrillation (HCC)        9. Retinal artery occlusion            Medical Decision Making: Today's Assessment/Status/Plan:        Paroxysmal afib  Retinal artery occlusion while on anticoagulation  Vertebral artery stenosis of 50%  Asymptomatic from an afib standpoint. Minimal residual vision loss. [id number] 5 (age, female, HTN, CVAx2).    - Echo did not demonstrate any evidence of right to left shunt.   -Continue diltiazem 120mg twice daily and carvedilol 50mg tiwce daily.   -Continue warfarin and aspirin. Had an extensive discussion about anticoagulation and that she had an adverse event while on warfarin so she should consider switching to a DOAC such as eliquis or xarelto given a thrombus while on coumadin in the setting of  INR at goal. She does not feel comfortable with anticoagulation that does not have an objective measurement to ensure therapeutic index or is easily reversible. She would like to remain on aspirin 81mg and warfarin until she follows up with neurology and ophthalmology. She understands the risk of a recurrent clot even if her INR is therapeutic and she is willing to accept that risk. Also discussed considering watchman device in the future  as a means of preventing thromboembolism due to afib. She will look into it.      HTN  -Mostly well controlled.   -Continue losartan 100mg daily, clonidine 0.1mg BID, chlorthalidone 25 mg daily  -Continue diltiazem and carvedilol as above  -Counseled the patient to continue to measure BP at home. If home BPs remains above 130/80, will plan to increase chlorthalidone to 37.5 mg daily.     Dyslipidemia  -LDL 76; not at goal of <70.   -Increase atorvastatin 20mg daily.     Disequilibrium; history ischemic CVA  -A referral is in place for Neurology. She was provided with the number to schedule.     Follow up in 5 months or sooner with clinical changes.     Encouraged her to reach out via MyChart or telephone with any questions or concerns.    Thank you for allowing me to participate in the care of Tonya Diamond Del Valle .    Naila Nugent PA-C, Cardiology  [facility], [facility]  [address] [address]  Phone: [phone]  Fax: [phone]    PLEASE NOTE: This note was created using voice recognition software. I have made every reasonable attempt to correct obvious errors, but I expect that there are errors of grammar and possibly content that I did not discover before finalizing the note.     I personally spent a total of 39 minutes which includes face-to-face time and non-face-to-face time spent on preparing to see the patient, reviewing hospital notes and tests, obtaining history from the patient, performing a medically appropriate exam, counseling and educating the patient, ordering medications/tests/procedures/referrals as clinically indicated, and documenting information in the electronic medical record.

## 2025-02-06 ENCOUNTER — APPOINTMENT (OUTPATIENT)
Dept: MEDICAL GROUP | Facility: PHYSICIAN GROUP | Age: 75
End: 2025-02-06
Payer: MEDICARE

## 2025-02-06 ENCOUNTER — TELEPHONE (OUTPATIENT)
Dept: HEALTH INFORMATION MANAGEMENT | Facility: OTHER | Age: 75
End: 2025-02-06

## 2025-02-09 NOTE — PROGRESS NOTES
"Subjective:     CC:  The encounter diagnosis was Lung nodule.    HISTORY OF THE PRESENT ILLNESS: Patient is a 74 y.o. female. This pleasant patient is here today to establish care in the Lung Nodule Clinic and discuss lung nodules. Her referring provider is Elizabeth CONTRERAS.    Pulmonary nodules- Eulalia is a 74F with HLD, prediabetes, hx of ischemic stroke, and afib.  She is a nonsmoker.  Eulalia is presenting for evaluation of incidentally noted pulmonary nodules found on CTA neck 1/16/25.  Her CTA neck 1/16/25 showed, \"5.1 mm right upper lobe pulmonary nodule on image 40, 4.3 mm pulmonary nodule in the left upper lobe on image 24, 4.6 mm pulmonary nodule in the left lower lobe on image 19, and 4.2 mm left upper lobe pulmonary nodule on image 14.\"  She has no prior CT chests for comparison.    Several of her family members have asthma.    No family hx of any other lung disease. Her grandmother had possible hand cancer, no other known cancers in the family.    She had significant second hand tobacco smoke when  to her first , but her current  ( for the last 41 years) quit smoking about 25 years ago.  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  She enjoys sewing and gardening.  She has lived in [place], [facility] and [place].  She has known exposure to home cleaning chemicals (from the market).  Otherwise, no known exposures to dust, fumes, solvents, gases, tuberculosis, radon, chemicals, radiation, or asbestos.  She has no history of hospitalization for respiratory problems or been on a ventilator.  She had PNA treated outpatient within the last 10 years.  She has no personal hx of asthma, emphysema, or COPD.    No recent fevers, chills.  She has had purposeful 60lb weight loss over 2.5 years.  No recent chest pain, palpitations, SOB, wheezing.  She rarely has a cough in the PM when having post nasal drainage from environmental allergies.  No " nausea, vomiting, GERD symptoms.  Urinating and stooling normally.  She has difficulty walking since her stroke in August 2009 and feels fatigued with very prolonged walking.    She had sepsis in 2019 due to urinary source, but she is not sure if she had a CT of her chest or abdomen.  She was told she had a gallstones, cyst in her spleen, but no lung abnormalities.      Allergies: Sulfa drugs    Current Outpatient Medications Ordered in Epic   Medication Sig Dispense Refill    VITAMIN D PO Take 3,000 mg by mouth every day.      atorvastatin (LIPITOR) 20 MG Tab Take 1 Tablet by mouth every evening. 100 Tablet 3    aspirin (ASA) 81 MG Chew Tab chewable tablet Chew 1 Tablet every day. 100 Tablet 3    Calcium 600 MG Tab Take 600 mg by mouth every day.      carvedilol (COREG) 25 MG Tab Take 2 Tablets by mouth 2 times a day. 360 Tablet 3    chlorthalidone (HYGROTON) 25 MG Tab Take 1 Tablet by mouth every morning. 100 Tablet 3    cloNIDine (CATAPRES) 0.1 MG Tab Take 1 Tablet by mouth 2 times a day. 200 Tablet 3    dilTIAZem (CARDIZEM) 120 MG Tab Take 1 Tablet by mouth 2 times a day. 200 Tablet 3    loratadine (CLARITIN) 10 MG Tab Take 1 Tablet by mouth every day. 100 Tablet 3    losartan (COZAAR) 100 MG Tab Take 1 Tablet by mouth every day. 100 Tablet 3    warfarin (COUMADIN) 5 MG Tab Follow directions per anticoagulation clinic. 5 mg (5 mg x 1) every Wed; 10 mg (5 mg x 2) all other days (Patient taking differently: 5 mg on Saturday, 10 mg all other days) 200 Tablet 3    Omega-3 Fatty Acids (OMEGA 3 PO) Take 1 Capsule by mouth every day.       No current [facility]-ordered facility-administered medications on file.       Past Medical History:   Diagnosis Date    Arrhythmia     Blood transfusion without reported diagnosis     Cataract     Clotting disorder (HCC)     History of echocardiogram - 7/2019 normal     Hyperlipidemia     Hypertension     Ischemic cerebrovascular accident (CVA) (HCC) 05/05/2017    Obesity (BMI 30-39.9)   "      Past Surgical History:   Procedure Laterality Date    CATARACT EXTRACTION WITH IOL      DENTAL EXTRACTION(S)      OTHER      artery repair in 1970 related to childbirth       Social History     Tobacco Use    Smoking status: Never    Smokeless tobacco: Never   Vaping Use    Vaping status: Never Used   Substance Use Topics    Alcohol use: Not Currently     Comment: rare wine    Drug use: Never       Social History     Social History Narrative    Not on file       Family History   Problem Relation Age of Onset    Heart Disease Mother     Obesity Mother     Heart Attack Father         at age of 72    Alcohol abuse Maternal Grandfather     Cancer Paternal Grandmother         hand lesion    Alcohol abuse Paternal Grandfather        Health Maintenance: flu vaccine declined    ROS:   Gen: no fevers/chills, no changes in weight  ENT: no bloody nose  Pulm: no sob, no cough  CV: no chest pain, no palpitations  GI: no nausea/vomiting, no diarrhea  : no dysuria  MSk: chronic limited mobility of her right leg since her stroke  Skin: no rash  Neuro: no headaches.    Heme/Lymph: no abnormal bleeding      Objective:       Exam: /70 (BP Location: Right arm, Patient Position: Sitting, BP Cuff Size: Adult)   Pulse 77   Resp 18   Ht 1.651 m (5' 5\")   Wt 88.5 kg (195 lb)   SpO2 92%  Body mass index is 32.45 kg/m².    General: Normal appearing. No distress.  HEENT: Normocephalic.    Eyes: Eyes conjunctiva clear lids without ptosis  Neck: Supple. Thyroid is not enlarged.  Pulmonary: Clear to ausculation.  Normal effort. No rales, ronchi, or wheezing.  Cardiovascular: Regular rate and rhythm without murmur.   Abdomen: Soft, nontender, nondistended. Normal bowel sounds.   Neurologic: No gross motor deficits  Lymph: No cervical or supraclavicular lymph nodes are palpable  Skin: Warm and dry.  No obvious lesions.  Musculoskeletal: No extremity cyanosis, clubbing, or edema.  Psych: Normal mood and affect. Alert and oriented. " "Judgment and insight is normal.      Assessment & Plan:   74 y.o. female with the following -    1. Pulmonary nodules  Newly presenting, asymptomatic.  Eulalia is a non-smoker who is a low risk patient.  We reviewed Eulalia's CTA neck results from 1/16/25 together today. We reviewed her CT abd imaging report from 7/11/2019 (media) which stated \"lung bases essentially clear.\"  We discussed that lung nodules can be due to inflammation, scarring, infection, tumor/malignancy. We discussed that based on the Fleischner Society 2017 recommendations for multiple lung nodules less than 6mm for a low risk patient, no routine follow-up imaging is required.  Encouraged her to stay UTD on routine cancer screenings and vaccines.  Follow-up and red flag precautions given. Patient expressed understanding and agreement with plan.    39 minute was spent face-to-face and chart reviewing for this encounter.    Return for follow-up lung nodules if you have CT imaging for other reasons in the future.    Please note that this dictation was created using voice recognition software. I have made every reasonable attempt to correct obvious errors, but I expect that there are errors of grammar and possibly content that I did not discover before finalizing the note.  "

## 2025-02-10 ENCOUNTER — OFFICE VISIT (OUTPATIENT)
Dept: SLEEP MEDICINE | Facility: MEDICAL CENTER | Age: 75
End: 2025-02-10
Payer: MEDICARE

## 2025-02-10 VITALS
HEIGHT: 65 IN | WEIGHT: 195 LBS | BODY MASS INDEX: 32.49 KG/M2 | DIASTOLIC BLOOD PRESSURE: 70 MMHG | OXYGEN SATURATION: 92 % | RESPIRATION RATE: 18 BRPM | HEART RATE: 77 BPM | SYSTOLIC BLOOD PRESSURE: 130 MMHG

## 2025-02-10 DIAGNOSIS — R91.8 PULMONARY NODULES: ICD-10-CM

## 2025-02-10 PROCEDURE — 99213 OFFICE O/P EST LOW 20 MIN: CPT | Performed by: FAMILY MEDICINE

## 2025-02-10 PROCEDURE — 3075F SYST BP GE 130 - 139MM HG: CPT | Performed by: FAMILY MEDICINE

## 2025-02-10 PROCEDURE — 3078F DIAST BP <80 MM HG: CPT | Performed by: FAMILY MEDICINE

## 2025-02-10 PROCEDURE — 99214 OFFICE O/P EST MOD 30 MIN: CPT | Performed by: FAMILY MEDICINE

## 2025-02-10 ASSESSMENT — FIBROSIS 4 INDEX: FIB4 SCORE: 3.05

## 2025-02-10 NOTE — Clinical Note
It was a pleasure seeing Eulalia in the Lung Nodule Clinic today.  I have attached the encounter note for your review. -Dr. Kae Lira

## 2025-02-13 ENCOUNTER — ANTICOAGULATION MONITORING (OUTPATIENT)
Dept: VASCULAR LAB | Facility: MEDICAL CENTER | Age: 75
End: 2025-02-13
Payer: MEDICARE

## 2025-02-13 DIAGNOSIS — Z79.01 LONG TERM (CURRENT) USE OF ANTICOAGULANTS: ICD-10-CM

## 2025-02-13 DIAGNOSIS — Z86.73 HISTORY OF ISCHEMIC STROKE: ICD-10-CM

## 2025-02-13 DIAGNOSIS — I48.91 ATRIAL FIBRILLATION, UNSPECIFIED TYPE (HCC): ICD-10-CM

## 2025-02-13 LAB — INR PPP: 3.4 (ref 2–3.5)

## 2025-02-13 NOTE — PROGRESS NOTES
Anticoagulation Summary  As of 2/13/2025      INR goal:  2.0-3.0   TTR:  84.9% (4.6 y)   INR used for dosing:  3.40 (2/13/2025)   Warfarin maintenance plan:  5 mg (5 mg x 1) every Sat; 10 mg (5 mg x 2) all other days   Weekly warfarin total:  65 mg   Plan last modified:  Hyacinth Bliss A.P.NKimmy (7/15/2024)   Next INR check:  2/20/2025   Target end date:  Indefinite    Indications    AF (atrial fibrillation) ([facility]) [I48.91]  Long term (current) use of anticoagulants [Z79.01] [Z79.01]  History of ischemic stroke [Z86.73]                 Anticoagulation Episode Summary       INR check location:  Anticoagulation Clinic    Preferred lab:  --    Send INR reminders to:  --    Comments:    Only call if out of range          Anticoagulation Care Providers       Provider Role Specialty Phone number    KRISTA [email or web address].  Nurse Practitioner Family [phone]          Anticoagulation Patient Findings  Patient Findings       Negatives:  Signs/symptoms of thrombosis, Signs/symptoms of bleeding, Laboratory test error suspected, Change in health, Change in alcohol use, Change in activity, Upcoming invasive procedure, Emergency department visit, Upcoming dental procedure, Missed doses, Extra doses, Change in medications, Change in diet/appetite, Hospital admission, Bruising, Other complaints            INR is supratherapeutic  Reason(s) for out of range INR today: No obvious causes      Called and spoke to patient.    Started ASA 81mg QD (1/17/25) due to stroke in eye       Warfarin Plan: Decrease to Warfarin 5 mg tonight and then Continue regimen as listed above.    Next INR in 1 week(s).    Alyssa Caballero, IdrisD

## 2025-02-20 ENCOUNTER — ANTICOAGULATION MONITORING (OUTPATIENT)
Dept: VASCULAR LAB | Facility: MEDICAL CENTER | Age: 75
End: 2025-02-20
Payer: MEDICARE

## 2025-02-20 DIAGNOSIS — Z79.01 LONG TERM (CURRENT) USE OF ANTICOAGULANTS: ICD-10-CM

## 2025-02-20 DIAGNOSIS — I48.91 ATRIAL FIBRILLATION, UNSPECIFIED TYPE (HCC): ICD-10-CM

## 2025-02-20 DIAGNOSIS — Z86.73 HISTORY OF ISCHEMIC STROKE: ICD-10-CM

## 2025-02-20 LAB — INR PPP: 2.5 (ref 2–3.5)

## 2025-02-20 NOTE — PROGRESS NOTES
Anticoagulation Summary  As of 2025      INR goal:  2.0-3.0   TTR:  84.8% (4.6 y)   INR used for dosin.50 (2025)   Warfarin maintenance plan:  5 mg (5 mg x 1) every Sat; 10 mg (5 mg x 2) all other days   Weekly warfarin total:  65 mg   Plan last modified:  Hyacinth Bliss A.P.NKimmy (7/15/2024)   Next INR check:  2025   Target end date:  Indefinite    Indications    AF (atrial fibrillation) (HCC) [I48.91]  Long term (current) use of anticoagulants [Z79.01] [Z79.01]  History of ischemic stroke [Z86.73]                 Anticoagulation Episode Summary       INR check location:  Anticoagulation Clinic    Preferred lab:  --    Send INR reminders to:  --    Comments:    Only call if out of range          Anticoagulation Care Providers       Provider Role Specialty Phone number    NATALI PadillaRKimmyN.  Nurse Practitioner Family [phone]            Refer to Anticoagulation Patient Findings for HPI    INR therapeutic at 2.5.      Per chart review, pt prefers no phone call if INR in range.    Pt to continue with current warfarin dosing regimen.     Will follow up in 1 week(s).     Renny Christina, PharmD, BCACP

## 2025-03-07 ENCOUNTER — ANTICOAGULATION MONITORING (OUTPATIENT)
Dept: VASCULAR LAB | Facility: MEDICAL CENTER | Age: 75
End: 2025-03-07
Payer: MEDICARE

## 2025-03-07 DIAGNOSIS — Z86.73 HISTORY OF ISCHEMIC STROKE: ICD-10-CM

## 2025-03-07 DIAGNOSIS — I48.91 ATRIAL FIBRILLATION, UNSPECIFIED TYPE (HCC): ICD-10-CM

## 2025-03-07 DIAGNOSIS — Z79.01 LONG TERM (CURRENT) USE OF ANTICOAGULANTS: ICD-10-CM

## 2025-03-07 LAB — INR PPP: 2.9 (ref 2–3.5)

## 2025-03-07 NOTE — PROGRESS NOTES
Anticoagulation Summary  As of 3/7/2025      INR goal:  2.0-3.0   TTR:  84.9% (4.6 y)   INR used for dosin.90 (3/7/2025)   Warfarin maintenance plan:  5 mg (5 mg x 1) every Sat; 10 mg (5 mg x 2) all other days   Weekly warfarin total:  65 mg   Plan last modified:  Hyacinth Bliss A.P.NKimmy (7/15/2024)   Next INR check:  3/21/2025   Target end date:  Indefinite    Indications    AF (atrial fibrillation) (HCC) [I48.91]  Long term (current) use of anticoagulants [Z79.01] [Z79.01]  History of ischemic stroke [Z86.73]                 Anticoagulation Episode Summary       INR check location:  Anticoagulation Clinic    Preferred lab:  --    Send INR reminders to:  --    Comments:    Only call if out of range          Anticoagulation Care Providers       Provider Role Specialty Phone number    NATALI PadillaRKimmyN.  Nurse Practitioner Family [phone]            Refer to Anticoagulation Patient Findings for HPI    INR therapeutic at 2.9.      Per chart review, pt prefers no phone call if INR in range.    Pt to continue with current warfarin dosing regimen.     Will follow up in 2 week(s).     Renny Christina, PharmD, BCACP

## 2025-03-21 ENCOUNTER — ANTICOAGULATION MONITORING (OUTPATIENT)
Dept: VASCULAR LAB | Facility: MEDICAL CENTER | Age: 75
End: 2025-03-21
Payer: MEDICARE

## 2025-03-21 DIAGNOSIS — Z86.73 HISTORY OF ISCHEMIC STROKE: ICD-10-CM

## 2025-03-21 DIAGNOSIS — Z79.01 LONG TERM (CURRENT) USE OF ANTICOAGULANTS: ICD-10-CM

## 2025-03-21 LAB — INR PPP: 2.4 (ref 2–3.5)

## 2025-03-21 NOTE — PROGRESS NOTES
Anticoagulation Summary  As of 3/21/2025      INR goal:  2.0-3.0   TTR:  85.0% (4.7 y)   INR used for dosin.40 (3/21/2025)   Warfarin maintenance plan:  5 mg (5 mg x 1) every Sat; 10 mg (5 mg x 2) all other days   Weekly warfarin total:  65 mg   Plan last modified:  Hyacinth Blsis A.P.NKimmy (7/15/2024)   Next INR check:  2025   Target end date:  Indefinite    Indications    AF (atrial fibrillation) (HCC) [I48.91]  Long term (current) use of anticoagulants [Z79.01] [Z79.01]  History of ischemic stroke [Z86.73]                 Anticoagulation Episode Summary       INR check location:  Anticoagulation Clinic    Preferred lab:  --    Send INR reminders to:  --    Comments:    Only call if out of range          Anticoagulation Care Providers       Provider Role Specialty Phone number    KRISTA Padilla.R.N.  Nurse Practitioner Family [phone]          Anticoagulation Patient Findings      Pt reported a therapeutic INR  Per chart review, pt does not want to be contacted if INR is therapeutic   Pt to continue with current warfarin dosing regimen. Requested pt contact the clinic for any s/s of unusual bleeding, bruising, clotting or any changes to diet or medication.    FU INR in 2 week(s).    Idris AyalaD

## 2025-03-26 ENCOUNTER — OFFICE VISIT (OUTPATIENT)
Dept: MEDICAL GROUP | Facility: PHYSICIAN GROUP | Age: 75
End: 2025-03-26
Payer: MEDICARE

## 2025-03-26 VITALS
TEMPERATURE: 98.3 F | BODY MASS INDEX: 32.65 KG/M2 | OXYGEN SATURATION: 92 % | HEART RATE: 79 BPM | HEIGHT: 65 IN | SYSTOLIC BLOOD PRESSURE: 124 MMHG | WEIGHT: 196 LBS | DIASTOLIC BLOOD PRESSURE: 72 MMHG | RESPIRATION RATE: 20 BRPM

## 2025-03-26 DIAGNOSIS — I10 HYPERTENSION, UNSPECIFIED TYPE: ICD-10-CM

## 2025-03-26 DIAGNOSIS — Z86.73 HISTORY OF ISCHEMIC STROKE: ICD-10-CM

## 2025-03-26 DIAGNOSIS — R73.03 PREDIABETES: ICD-10-CM

## 2025-03-26 DIAGNOSIS — I48.91 ATRIAL FIBRILLATION, UNSPECIFIED TYPE (HCC): ICD-10-CM

## 2025-03-26 DIAGNOSIS — M81.0 AGE-RELATED OSTEOPOROSIS WITHOUT CURRENT PATHOLOGICAL FRACTURE: ICD-10-CM

## 2025-03-26 DIAGNOSIS — Z00.00 ENCOUNTER FOR ANNUAL WELLNESS EXAM IN MEDICARE PATIENT: ICD-10-CM

## 2025-03-26 DIAGNOSIS — E78.5 DYSLIPIDEMIA: ICD-10-CM

## 2025-03-26 PROCEDURE — 3074F SYST BP LT 130 MM HG: CPT

## 2025-03-26 PROCEDURE — 3078F DIAST BP <80 MM HG: CPT

## 2025-03-26 PROCEDURE — G0439 PPPS, SUBSEQ VISIT: HCPCS

## 2025-03-26 ASSESSMENT — PATIENT HEALTH QUESTIONNAIRE - PHQ9: CLINICAL INTERPRETATION OF PHQ2 SCORE: 0

## 2025-03-26 ASSESSMENT — ACTIVITIES OF DAILY LIVING (ADL): BATHING_REQUIRES_ASSISTANCE: 0

## 2025-03-26 ASSESSMENT — ENCOUNTER SYMPTOMS: GENERAL WELL-BEING: GOOD

## 2025-03-26 ASSESSMENT — FIBROSIS 4 INDEX: FIB4 SCORE: 3.05

## 2025-03-26 NOTE — PROGRESS NOTES
HPI:  Tonya Del Valle is a 74 y.o. here for Medicare Annual Wellness Visit           Patient Active Problem List     Diagnosis Date Noted    Cerebral atrophy (HCC) 01/22/2025    Atherosclerosis 01/22/2025    Vision loss, left eye 01/17/2025    Allergies 01/17/2025    Lung nodule 01/17/2025    Age-related osteoporosis without current pathological fracture 09/26/2024    Obesity due to excess calories with serious comorbidity 09/26/2024    Hypercoagulable state due to paroxysmal atrial fibrillation (HCC) 10/05/2023    Disequilibrium 12/08/2022    Personal history of fall 12/08/2022    Right leg weakness 05/24/2022    Prediabetes 05/13/2021    Obesity (BMI 30-39.9)      HTN (hypertension) 07/16/2020    Hyperlipidemia 07/16/2020    History of ischemic stroke 05/05/2017    AF (atrial fibrillation) (HCC) 05/05/2017    Long term (current) use of anticoagulants [Z79.01] 05/05/2017         Current Medications          Current Outpatient Medications   Medication Sig Dispense Refill    VITAMIN D PO Take 3,000 mg by mouth every day.        atorvastatin (LIPITOR) 20 MG Tab Take 1 Tablet by mouth every evening. 100 Tablet 3    aspirin (ASA) 81 MG Chew Tab chewable tablet Chew 1 Tablet every day. 100 Tablet 3    Calcium 600 MG Tab Take 600 mg by mouth every day.        carvedilol (COREG) 25 MG Tab Take 2 Tablets by mouth 2 times a day. 360 Tablet 3    chlorthalidone (HYGROTON) 25 MG Tab Take 1 Tablet by mouth every morning. 100 Tablet 3    cloNIDine (CATAPRES) 0.1 MG Tab Take 1 Tablet by mouth 2 times a day. 200 Tablet 3    dilTIAZem (CARDIZEM) 120 MG Tab Take 1 Tablet by mouth 2 times a day. 200 Tablet 3    loratadine (CLARITIN) 10 MG Tab Take 1 Tablet by mouth every day. 100 Tablet 3    losartan (COZAAR) 100 MG Tab Take 1 Tablet by mouth every day. 100 Tablet 3    warfarin (COUMADIN) 5 MG Tab Follow directions per anticoagulation clinic. 5 mg (5 mg x 1) every Wed; 10 mg (5 mg x 2) all other days (Patient taking differently: 5 mg  on Saturday, 10 mg all other days) 200 Tablet 3    Omega-3 Fatty Acids (OMEGA 3 PO) Take 1 Capsule by mouth every day.          No current facility-administered medications for this visit.             Current supplements as per medication list.      Allergies: Sulfa drugs     Current social contact/activities:      She  reports that she has never smoked. She has never used smokeless tobacco. She reports that she does not currently use alcohol. She reports that she does not use drugs.  Counseling given: Not Answered        ROS:    Gait: Uses a walker  Ostomy: No  Other tubes: No  Amputations: No  Chronic oxygen use: No  Last eye exam: 2x wks; End of Feb  Wears hearing aids: No   : Denies any urinary leakage during the last 6 months     Screening:    Depression Screening  Little interest or pleasure in doing things? no  Feeling down, depressed , or hopeless?  no   Trouble falling or staying asleep, or sleeping too much?  no   Feeling tired or having little energy? no  Poor appetite or overeating?no     Feeling bad about yourself - or that you are a failure or have let yourself or your family down? no  Trouble concentrating on things, such as reading the newspaper or watching television? no  Moving or speaking so slowly that other people could have noticed.  Or the opposite - being so fidgety or restless that you have been moving around a lot more than usual?  no   Thoughts that you would be better off dead, or of hurting yourself?     Patient Health Questionnaire Score: no     If depressive symptoms identified deferred to follow up visit unless specifically addressed in assessment and plan.     Interpretation of PHQ-9 Total Score   Score Severity   1-4 No Depression   5-9 Mild Depression   10-14 Moderate Depression   15-19 Moderately Severe Depression   20-27 Severe Depression     Screening for Cognitive Impairment  Do you or any of your friends or family members have any concern about your memory?  No  Three Minute  Recall (Village, Kitchen, Baby) 3 /3    Severo clock face with all 12 numbers and set the hands to show 10 minutes past 11.       Cognitive concerns identified deferred for follow up unless specifically addressed in assessment and plan.     Fall Risk Assessment  Has the patient had two or more falls in the last year or any fall with injury in the last year?  no     Safety Assessment  Do you always wear your seatbelt? yes    Any changes to home needed to function safely? no  Difficulty hearing.     Patient counseled about all safety risks that were identified.     Functional Assessment ADLs  Are there any barriers preventing you from cooking for yourself or meeting nutritional needs?  No  is helpful.    Are there any barriers preventing you from driving safely or obtaining transportation?   .    Are there any barriers preventing you from using a telephone or calling for help? no    Are there any barriers preventing you from shopping?  No .    Are there any barriers preventing you from taking care of your own finances?       Are there any barriers preventing you from managing your medications?       Are there any barriers preventing you from showering, bathing or dressing yourself?      Are there any barriers preventing you from doing housework or laundry?      Are there any barriers preventing you from using the toilet?     Are you currently engaging in any exercise or physical activity?   .       Self-Assessment of Health  What is your perception of your health?      Do you sleep more than six hours a night? No, sleeps 4-6 hours/day     In the past 7 days, how much did pain keep you from doing your normal work? No pain reported  Do you spend quality time with family or friends (virtually or in person)?Yes  Do you usually eat a heart healthy diet that constists of a variety of fruits, vegetables, whole grains and fiber? Yes, enjoys dining out with  on occasion  Do you eat foods high in fat and/or Fast  Food more than three times per week? no     How concerned are you that your medical conditions are not being well managed? No concern   Are you worried that in the next 2 months, you may not have stable housing that you own, rent, or stay in as part of a household? no           Advance Care Planning  Do you have an Advance Directive, Living Will, Durable Power of , or POLST?                     Health Maintenance Summary              Current Care Gaps         Annual Wellness Visit (Yearly) Overdue since 8/15/2023        08/15/2022   Level of Service: [place] PREVENTIVE VISIT,EST,65 & OVER     08/15/2022   Visit Dx: Encounter for Medicare annual wellness exam                  Zoster (Shingles) Vaccines (1 of 2) Never done      No completion history exists for this topic.                            Upcoming         Influenza Vaccine (1) Postponed until 9/26/2025 11/16/2022   Imm Admin: Influenza Vaccine Adult HD     11/18/2021   Imm Admin: Influenza Vaccine Adult HD     10/22/2020   Imm Admin: Influenza Vaccine Adult HD     10/02/2019   Imm Admin: Influenza Vaccine Adult HD     12/19/2018   Imm Admin: Influenza Vaccine Adult HD      Only the first 5 history entries have been loaded, but more history exists.              COVID-19 Vaccine (1 - 2024-25 season) Postponed until 9/26/2025        No completion history exists for this topic.                  Colorectal Cancer Screening (Colon Cancer Screening Cologuard Stool (FIT DNA) - Preferred) Next due on 2/21/2027 02/21/2024   COLOGUARD COLON CANCER SCREENING     09/15/2020   Colonoscopy (Done - cologuard completed in [facility])     09/09/2020   Colon Cancer Screening Cologuard Stool (FIT DNA) (Done)     09/09/2020   COLOGUARD COLON CANCER SCREENING                  IMM DTaP/Tdap/Td Vaccine (2 - Td or Tdap) Next due on 6/15/2027        06/15/2017   Imm Admin: Tdap Vaccine     10/28/1998   Imm Admin: TD Vaccine                  Bone Density Scan (Every 5  Years) Next due on 7/4/2029 07/04/2024   DS-BONE DENSITY STUDY (DEXA)                            Completed or No Longer Recommended         Hepatitis A Vaccine (Hep A) (Series Information) Aged Out      No completion history exists for this topic.                  Hepatitis B Vaccine (Hep B) (Series Information) Aged Out      No completion history exists for this topic.                  HPV Vaccines (Series Information) Aged Out      No completion history exists for this topic.                  Polio Vaccine (Inactivated Polio) (Series Information) Aged Out      No completion history exists for this topic.                  Meningococcal Immunization (Series Information) Aged Out      No completion history exists for this topic.                  Hepatitis C Screening  Discontinued      No completion history exists for this topic.                  Pneumococcal Vaccine: 50+ Years  Discontinued        05/08/2020   Imm Admin: Pneumococcal polysaccharide vaccine (PPSV-23)                                    Patient Care Team:  MILO Aguero as PCP - General (Nurse Practitioner Family)  Isabella Mcleod M.D. (Cardiovascular Disease (Cardiology))        Social History   Social History            Tobacco Use    Smoking status: Never    Smokeless tobacco: Never   Vaping Use    Vaping status: Never Used   Substance Use Topics    Alcohol use: Not Currently       Comment: rare wine    Drug use: Never         Family History         Family History   Problem Relation Age of Onset    Heart Disease Mother      Obesity Mother      Heart Attack Father           at age of 72    Alcohol abuse Maternal Grandfather      Cancer Paternal Grandmother           hand lesion    Alcohol abuse Paternal Grandfather           She  has a past medical history of Arrhythmia, Blood transfusion without reported diagnosis, Cataract, Clotting disorder (HCC), History of echocardiogram - 7/2019 normal, Hyperlipidemia, Hypertension,  "Ischemic cerebrovascular accident (CVA) ([facility]) (05/05/2017), and Obesity (BMI 30-39.9).     She has no past medical history of Anemia, Anxiety, Arthritis, Asthma, Breast cancer ([facility]), Cancer ([facility]), CHF (congestive heart failure) ([facility]), COPD (chronic obstructive pulmonary disease) ([facility]), Depression, Diabetes ([facility]), GERD (gastroesophageal reflux disease), Heart attack ([facility]), Heart murmur, HIV (human immunodeficiency virus infection) ([facility]), Kidney disease, Migraine, Seizure ([facility]), or Thyroid disease.   Past Surgical History         Past Surgical History:   Procedure Laterality Date    CATARACT EXTRACTION WITH IOL        DENTAL EXTRACTION(S)        OTHER         artery repair in 1970 related to childbirth            Exam:   Ht 1.651 m (5' 5\")   Wt 88.9 kg (196 lb)  Body mass index is 32.62 kg/m².     Hearing fair.    Dentition good  Alert, oriented in no acute distress.  Eye contact is good, speech goal directed, affect calm     Assessment and Plan. The following treatment and monitoring plan is recommended:      History of Ischemic Stroke  Occurred Aug. 24, 2009 secondary to undiagnosed Afib.  Deficits on right side. She currently uses a walker and has right leg weakness. She has worked with PT/OT multiple times over the years to increase strength and balance.      2. Hypertension  Chronic, stable. Bp in clinic 124/72. Taking losartan 100 mg daily, catapres 0.1 mg BID, chlorthalidone 25 mg daily       3. AF  Chronic, ongoing. Was seen in ER for ocular stroke 1/16/25  Currently taking coumadin 10 mg x6 days weekly, 5 mg 1x weekly. Testing every other week.    Has started aspirin 81 mg daily, continue this  Continue carvedilol 50 BID, diltiazem 120 mg BID    4. Hyperlipemia   Rechecked lipids as a part of stroke workup, LDL not at goal  -atorvastatin to high dose 40mg daily     5. Age-related osteoporosis without current pathological fracture   Dexa 7/4/24 FINDINGS:  The lumbar spine has a mean bone mineral density of " 0.854 g/cm2, with a T score of -2.6 and a Z score of -1.4.  The proximal left femur has a mean bone mineral density of 0.841 g/cm2, with a T score of -1.3 and a Z score of 0.0.    IMPRESSION:  According to the World Health Organization classification, bone mineral density of this patient is osteoporotic in the lumbar spine and osteopenic in the left proximal femur.  10-year Probability of Fracture:  Major Osteoporotic 10.5%  Hip 1.9%  Population [place] ()    Pt has increased exercise, increased Vit D and calcium.  Will plan to repeat Dexa 2 years  Consider bisphosphonate, declines today    6. Vision loss, left eye  Reports vision has returned, has black spot in visual field s/p stroke     7. Lung nodule  - Approximately 50% stenosis of the distal right vertebral artery  - Pulmonary nodules, (Lung apices demonstrates 5.1 mm right upper lobe pulmonary nodule on image 40, 4.3 mm pulmonary nodule in the left upper lobe on image 24, 4.6 mm pulmonary nodule in the left lower lobe on image 19, and 4.2 mm left upper lobe pulmonary nodule on image 14    Plan follow up with LDCT for surveillance 12 months; no history of smoking, does have history of 2nd hand exposure; no cough, increased sob.    Pt expresses concern regarding these, and would like increased surveillance will provide referral to lung nodule clinic.        8. Atherosclerosis   Noted in CT/MRI 1/16/25- continue atorvastatin 40 mg daily     Approximately 50% stenosis of the distal right vertebral artery   Services suggested: No services needed at this time  Health Care Screening: Age-appropriate preventive services recommended by USPTF and ACIP covered by Medicare were discussed today. Services ordered if indicated and agreed upon by the patient.  Referrals offered: Community-based lifestyle interventions to reduce health risks and promote self-management and wellness, fall prevention, nutrition, physical activity, tobacco-use cessation, weight loss, and  mental health services as per orders if indicated.     Discussion today about general wellness and lifestyle habits:    Prevent falls and reduce trip hazards; Cautioned about securing or removing rugs.  Have a working fire alarm and carbon monoxide detector;   Engage in regular physical activity and social activities      Follow-up: 3 months or PRN

## 2025-04-04 ENCOUNTER — ANTICOAGULATION MONITORING (OUTPATIENT)
Dept: VASCULAR LAB | Facility: MEDICAL CENTER | Age: 75
End: 2025-04-04
Payer: MEDICARE

## 2025-04-04 DIAGNOSIS — I48.91 ATRIAL FIBRILLATION, UNSPECIFIED TYPE (HCC): ICD-10-CM

## 2025-04-04 DIAGNOSIS — Z86.73 HISTORY OF ISCHEMIC STROKE: ICD-10-CM

## 2025-04-04 DIAGNOSIS — Z79.01 LONG TERM (CURRENT) USE OF ANTICOAGULANTS: ICD-10-CM

## 2025-04-04 LAB — INR PPP: 2.5 (ref 2–3.5)

## 2025-04-04 NOTE — PROGRESS NOTES
Anticoagulation Summary  As of 2025      INR goal:  2.0-3.0   TTR:  85.2% (4.7 y)   INR used for dosin.50 (2025)   Warfarin maintenance plan:  5 mg (5 mg x 1) every Sat; 10 mg (5 mg x 2) all other days   Weekly warfarin total:  65 mg   Plan last modified:  Hyacinth Bliss A.P.NKimmy (7/15/2024)   Next INR check:  2025   Target end date:  Indefinite    Indications    AF (atrial fibrillation) ([facility]) [I48.91]  Long term (current) use of anticoagulants [Z79.01] [Z79.01]  History of ischemic stroke [Z86.73]                 Anticoagulation Episode Summary       INR check location:  Anticoagulation Clinic    Preferred lab:  --    Send INR reminders to:  --    Comments:    Only call if out of range          Anticoagulation Care Providers       Provider Role Specialty Phone number    KRISTA Padilla.R.N.  Nurse Practitioner Family [phone]          Anticoagulation Patient Findings    INR therapeutic at 2.5.  Per chart notes, patient requests contact only when out of range.  Pt is to continue with current warfarin dosing regimen.  Follow up in 2 weeks, to reduce risk of adverse events related to this high risk medication,  Warfarin.    Nestor Burch, PharmD, BCACP

## 2025-04-18 ENCOUNTER — ANTICOAGULATION MONITORING (OUTPATIENT)
Dept: VASCULAR LAB | Facility: MEDICAL CENTER | Age: 75
End: 2025-04-18
Payer: MEDICARE

## 2025-04-18 DIAGNOSIS — Z86.73 HISTORY OF ISCHEMIC STROKE: ICD-10-CM

## 2025-04-18 DIAGNOSIS — I48.91 ATRIAL FIBRILLATION, UNSPECIFIED TYPE (HCC): ICD-10-CM

## 2025-04-18 DIAGNOSIS — Z79.01 LONG TERM (CURRENT) USE OF ANTICOAGULANTS: ICD-10-CM

## 2025-04-18 LAB — INR PPP: 2.7 (ref 2–3.5)

## 2025-04-18 NOTE — PROGRESS NOTES
Anticoagulation Summary  As of 2025      INR goal:  2.0-3.0   TTR:  85.3% (4.8 y)   INR used for dosin.70 (2025)   Warfarin maintenance plan:  5 mg (5 mg x 1) every Sat; 10 mg (5 mg x 2) all other days   Weekly warfarin total:  65 mg   Plan last modified:  Hyacinth Bliss A.P.NKimmy (7/15/2024)   Next INR check:  2025   Target end date:  Indefinite    Indications    AF (atrial fibrillation) (HCC) [I48.91]  Long term (current) use of anticoagulants [Z79.01] [Z79.01]  History of ischemic stroke [Z86.73]                 Anticoagulation Episode Summary       INR check location:  Anticoagulation Clinic    Preferred lab:  --    Send INR reminders to:  --    Comments:    Only call if out of range          Anticoagulation Care Providers       Provider Role Specialty Phone number    NATALI PadillaRKimmyN.  Nurse Practitioner Family [phone]            Refer to Anticoagulation Patient Findings for HPI    INR therapeutic at 2.7.      Per chart review, pt prefers no phone call if INR in range.    Pt to continue with current warfarin dosing regimen.     Will follow up in 2 week(s).     Renny Christina, PharmD, BCACP

## 2025-04-30 ENCOUNTER — OFFICE VISIT (OUTPATIENT)
Dept: NEUROLOGY | Facility: MEDICAL CENTER | Age: 75
End: 2025-04-30
Attending: PSYCHIATRY & NEUROLOGY
Payer: MEDICARE

## 2025-04-30 VITALS
HEIGHT: 65 IN | RESPIRATION RATE: 14 BRPM | BODY MASS INDEX: 32.76 KG/M2 | SYSTOLIC BLOOD PRESSURE: 124 MMHG | WEIGHT: 196.65 LBS | DIASTOLIC BLOOD PRESSURE: 60 MMHG | HEART RATE: 72 BPM | OXYGEN SATURATION: 96 %

## 2025-04-30 DIAGNOSIS — Z86.73 HISTORY OF ISCHEMIC STROKE: ICD-10-CM

## 2025-04-30 DIAGNOSIS — H34.12 CENTRAL RETINAL ARTERY OCCLUSION OF LEFT EYE: ICD-10-CM

## 2025-04-30 DIAGNOSIS — I48.91 ATRIAL FIBRILLATION, UNSPECIFIED TYPE (HCC): ICD-10-CM

## 2025-04-30 PROCEDURE — 99212 OFFICE O/P EST SF 10 MIN: CPT | Performed by: PSYCHIATRY & NEUROLOGY

## 2025-04-30 ASSESSMENT — FIBROSIS 4 INDEX: FIB4 SCORE: 3.05

## 2025-04-30 ASSESSMENT — PATIENT HEALTH QUESTIONNAIRE - PHQ9: CLINICAL INTERPRETATION OF PHQ2 SCORE: 0

## 2025-04-30 NOTE — PROGRESS NOTES
[facility] NEUROLOGY CLINIC    Date of Service: 04/30/25    Referred by: Elizabeth Blue A.P.R.NKimmy  [address]  [address]  [place] [id number]      Reason for referral  History of stroke    Previously evaluated by a neurologist   No    History of present illness (HPI)   Tonya Del Valle is a 74 y.o. female with a history of atrial fibrillation and ischemic stroke. She is on anticoagulation with warfarin. She is here for follow up of CRAO, for which she was in the hospital from 1/16 - 1/17.     Summary of the events in January:    74-year-old female with history of CVA (2019), paroxysmal atrial fibrillation on warfarin, hypertension, and hyperlipidemia presented from ophthalmology with acute left eye vision changes concerning for CRAO. CTA head/neck revealed ~50% stenosis of the distal right vertebral artery and multiple small pulmonary nodules (largest 5.1 mm). Brain MRI showed no acute infarct but did reveal chronic pontine infarcts and a small right petrous apex effusion. Echocardiogram showed normal EF with no regional wall motion abnormalities. Vision returned to baseline. INR was therapeutic at 2.79. Labs were unremarkable (LDL 76, TSH 3.22, A1c 6.1). The patient remained clinically stable, eating and drinking well. Stroke workup completed, and patient was discharged home in stable condition with instructions to follow up closely with ophthalmology, PCP, and cardiology (to consider possible switch to DOAC). Pulmonary referral was offered for nodules but deferred by patient, who prefers PCP coordination.      Stroke in 2009 with residual right leg weakness. She uses a walker since then. She was not on any blood thinners at that time. She was diagnosed with atrial fibrillation since then.   At the ER, she was told that she might need to switch Warfarin for a newer class of anticoagulant. She is also taking aspirin 81 mg QD. Denies any bleeding/bruising. No falls or near/falls, but feels off balance.  She is not working with PT anymore.       Review of Systems (ROS)  Negative for: Fever, chills, chest pain, shortness of breath, cough, diarrhea, constipation, urinary frequency, dysuria, skin rash, swelling.    Medical history  Past Medical History:   Diagnosis Date    Arrhythmia     Blood transfusion without reported diagnosis     Cataract     Clotting disorder (HCC)     History of echocardiogram - 7/2019 normal     Hyperlipidemia     Hypertension     Ischemic cerebrovascular accident (CVA) (HCC) 05/05/2017    Obesity (BMI 30-39.9)          Surgical history  Past Surgical History:   Procedure Laterality Date    CATARACT EXTRACTION WITH IOL      DENTAL EXTRACTION(S)      OTHER      artery repair in 1970 related to childbirth         Relevant family history  Family History   Problem Relation Age of Onset    Heart Disease Mother     Obesity Mother     Heart Attack Father         at age of 72    Alcohol abuse Maternal Grandfather     Cancer Paternal Grandmother         hand lesion    Alcohol abuse Paternal Grandfather          Social history  Social History     Tobacco Use    Smoking status: Never    Smokeless tobacco: Never   Substance Use Topics    Alcohol use: Not Currently     Comment: rare wine         Medications    Outpatient Medications Marked as Taking for the 4/30/25 encounter (Office Visit) with Luis Alberto Rudolph M.D.   Medication Sig Dispense Refill    VITAMIN D PO Take 3,000 mg by mouth every day.      atorvastatin (LIPITOR) 20 MG Tab Take 1 Tablet by mouth every evening. 100 Tablet 3    aspirin (ASA) 81 MG Chew Tab chewable tablet Chew 1 Tablet every day. 100 Tablet 3    Calcium 600 MG Tab Take 600 mg by mouth every day.      carvedilol (COREG) 25 MG Tab Take 2 Tablets by mouth 2 times a day. 360 Tablet 3    chlorthalidone (HYGROTON) 25 MG Tab Take 1 Tablet by mouth every morning. 100 Tablet 3    cloNIDine (CATAPRES) 0.1 MG Tab Take 1 Tablet by mouth 2 times a day. 200 Tablet 3    dilTIAZem (CARDIZEM)  120 MG Tab Take 1 Tablet by mouth 2 times a day. 200 Tablet 3    loratadine (CLARITIN) 10 MG Tab Take 1 Tablet by mouth every day. 100 Tablet 3    losartan (COZAAR) 100 MG Tab Take 1 Tablet by mouth every day. 100 Tablet 3    warfarin (COUMADIN) 5 MG Tab Follow directions per anticoagulation clinic. 5 mg (5 mg x 1) every Wed; 10 mg (5 mg x 2) all other days (Patient taking differently: 5 mg on Saturday, 10 mg all other days) 200 Tablet 3    Omega-3 Fatty Acids (OMEGA 3 PO) Take 1 Capsule by mouth every day.           Screening    Depression Screening    Little interest or pleasure in doing things?  0 - not at all  Feeling down, depressed , or hopeless? 0 - not at all  Trouble falling or staying asleep, or sleeping too much?     Feeling tired or having little energy?     Poor appetite or overeating?     Feeling bad about yourself - or that you are a failure or have let yourself or your family down?    Trouble concentrating on things, such as reading the newspaper or watching television?    Moving or speaking so slowly that other people could have noticed.  Or the opposite - being so fidgety or restless that you have been moving around a lot more than usual?     Thoughts that you would be better off dead, or of hurting yourself?     Patient Health Questionnaire Score:        If depressive symptoms identified deferred to follow up visit unless specifically addressed in assesment and plan.    Interpretation of PHQ-9 Total Score   Score Severity   1-4 No Depression   5-9 Mild Depression   10-14 Moderate Depression   15-19 Moderately Severe Depression   20-27 Severe Depression          [place] Suicide Severity Rating Scale     Wish to be Dead?: No  Suicidal Thoughts: No    Suicidal Thoughts with Method Without Specific Plan or Intent to Act:    Suicidal Intent Without Specific Plan:    Suicide Intent with Specific Plan:    Suicide Behavior Question: No  How long ago did you do any of these?:    C-SSRS Risk Level: No  Risk    Additional Suicide Screening Questions    Suspected or Confirmed Suicide Attempted?:    Harming or killing others?:      [place] Suicide Reassessment     New or continued thoughts about killing self?:    Preparing to end life?:              Physical exam    Vitals:    04/30/25 0945   BP: 124/60   Pulse: 72   Resp: 14   SpO2: 96%         General Exam:    In no distress.  Unlabored breathing.  No rash on visible skin.      Brief Neurological Exam:    Alert and oriented.  Speech fluency and comprehension are intact.  No cranial neuropathies appreciated.   Moves all extremities equally.   Walks with a walker.         Lab Results  Lab Results   Component Value Date/Time    WBC 7.1 01/16/2025 09:44 PM    RBC 5.15 01/16/2025 09:44 PM    HEMOGLOBIN 15.8 01/16/2025 09:44 PM    HEMATOCRIT 47.2 (H) 01/16/2025 09:44 PM    MCV 91.7 01/16/2025 09:44 PM    MCH 30.7 01/16/2025 09:44 PM    MCHC 33.5 01/16/2025 09:44 PM    MPV 10.0 01/16/2025 09:44 PM    NEUTSPOLYS 56.00 01/16/2025 09:44 PM    LYMPHOCYTES 32.00 01/16/2025 09:44 PM    MONOCYTES 8.50 01/16/2025 09:44 PM    EOSINOPHILS 2.50 01/16/2025 09:44 PM    BASOPHILS 0.70 01/16/2025 09:44 PM          Lab Results   Component Value Date/Time    SODIUM 141 01/16/2025 09:44 PM    POTASSIUM 4.5 01/16/2025 09:44 PM    CHLORIDE 103 01/16/2025 09:44 PM    CO2 26 01/16/2025 09:44 PM    GLUCOSE 98 01/16/2025 09:44 PM    BUN 23 (H) 01/16/2025 09:44 PM    CREATININE 0.82 01/16/2025 09:44 PM       Latest Reference Range & Units 01/16/25 21:44   Glycohemoglobin 4.0 - 5.6 % 6.1 (H)   (H): Data is abnormally high     Latest Reference Range & Units 01/17/25 00:26   Cholesterol,Tot 100 - 199 mg/dL 143   Triglycerides 0 - 149 mg/dL 88   HDL >=40 mg/dL 49   LDL <100 mg/dL 76      Latest Reference Range & Units 04/18/25 00:00   INR 2.00 - 3.50  2.70 (E)   (E): External lab result        NEURO-DIAGNOSTICS  MRI brain wo contrast: 1/17/25  No acute process.     Age-related volume loss and  chronic microvascular ischemic changes.     Remote pontine infarcts.     Small right petrous apex effusion.          CTA head and neck: 1/17/25  Normal CTA head.  1.  Approximately 50% stenosis of the distal right vertebral artery  2.  Pulmonary nodules, see nodule follow-up recommendations below.    Echocardiogram: 1/17/25  CONCLUSIONS  Compared to the prior study on 10/27/23, no changes.   Normal left ventricular systolic function.   Aneurysmal interatrial septum. Agitated saline (bubble) study was   performed with and without Valsalva maneuver. No evidence of right to   left shunt by agitated saline study.  No evidence of valvular abnormality based on Doppler evaluation.         Impression and Plan  Briefly, 75 y.o. 74-year-old female with atrial fibrillation and prior CVA with residual right leg weakness, currently anticoagulated with warfarin (therapeutic INR), who experienced a monocular ischemic event (CRAO) in January from which she has now recovered fully.  Given the occurrence of CRAO despite therapeutic INR and the challenges of long-term warfarin management, I recommend transitioning to apixaban 5 mg BID, assuming no contraindications (e.g., renal impairment, bleeding risk).  I have advised the patient to discuss this with her PCP, who can initiate the change. Alternatively, she may consult benign hematology for further input on anticoagulation strategy. I do not recommend concurrent aspirin unless there is future evidence of progressive small vessel disease or new ischemic events. A repeat brain MRI in 6 months is planned to evaluate for any asymptomatic infarcts or disease progression.    Additionally, Apixaban has shown superior stroke prevention and lower intracranial bleeding risk compared to warfarin in atrial fibrillation (OSCAR trial), and may ne preferable in this case of potential warfarin failure.       1. Central retinal artery occlusion of left eye    - MR-BRAIN-W/O; Future    2. Atrial  fibrillation, unspecified type (HCC)    - MR-BRAIN-W/O; Future    3. History of ischemic stroke    - MR-BRAIN-W/O; Future        Numerous questions were answered to the best of my knowledge. The patient is in agreement with the plan. Return to the clinic in 6 months    ADMINISTRATIVE BILLING  I spent a total of 50 minutes on this patient encounter.        Luis Alberto Rudolph MD  Diplomate of the American Board of Psychiatry and Neurology.  General Neurology & Neuro-Oncology.  [facility].   of Clinical Neurology at [facility].

## 2025-04-30 NOTE — PROGRESS NOTES
[facility] NEUROLOGY CLINIC    Date of Service: 04/30/25    Referred by: MILO Aguero  [address]  [address]  [place] [id number]      Reason for referral  History of stroke    Previously evaluated by a neurologist   No    History of present illness (HPI)   Tonya Del Valle is a 74 y.o. female with a history of atrial fibrillation and ischemic stroke. She is on anticoagulation with warfarin. She is here for follow up of CRAO, for which she was in the hospital from 1/16 - 1/17.     Summary of the events in January:    74-year-old female with history of CVA (2019), paroxysmal atrial fibrillation on warfarin, hypertension, and hyperlipidemia presented from ophthalmology with acute left eye vision changes concerning for CRAO. CTA head/neck revealed ~50% stenosis of the distal right vertebral artery and multiple small pulmonary nodules (largest 5.1 mm). Brain MRI showed no acute infarct but did reveal chronic pontine infarcts and a small right petrous apex effusion. Echocardiogram showed normal EF with no regional wall motion abnormalities. Vision returned to baseline. INR was therapeutic at 2.79. Labs were unremarkable (LDL 76, TSH 3.22, A1c 6.1). The patient remained clinically stable, eating and drinking well. Stroke workup completed, and patient was discharged home in stable condition with instructions to follow up closely with ophthalmology, PCP, and cardiology (to consider possible switch to DOAC). Pulmonary referral was offered for nodules but deferred by patient, who prefers PCP coordination.          Review of Systems (ROS)  Negative for: Fever, chills, chest pain, shortness of breath, cough, diarrhea, constipation, urinary frequency, dysuria, skin rash, swelling.    Medical history  Past Medical History:   Diagnosis Date    Arrhythmia     Blood transfusion without reported diagnosis     Cataract     Clotting disorder (HCC)     History of echocardiogram - 7/2019 normal     Hyperlipidemia      Hypertension     Ischemic cerebrovascular accident (CVA) ([facility]) 05/05/2017    Obesity (BMI 30-39.9)          Surgical history  Past Surgical History:   Procedure Laterality Date    CATARACT EXTRACTION WITH IOL      DENTAL EXTRACTION(S)      OTHER      artery repair in 1970 related to childbirth         Relevant family history  Family History   Problem Relation Age of Onset    Heart Disease Mother     Obesity Mother     Heart Attack Father         at age of 72    Alcohol abuse Maternal Grandfather     Cancer Paternal Grandmother         hand lesion    Alcohol abuse Paternal Grandfather          Social history  Social History     Tobacco Use    Smoking status: Never    Smokeless tobacco: Never   Substance Use Topics    Alcohol use: Not Currently     Comment: rare wine         Medications    Outpatient Medications Marked as Taking for the 4/30/25 encounter (Office Visit) with Luis Alberto Rudolph M.D.   Medication Sig Dispense Refill    VITAMIN D PO Take 3,000 mg by mouth every day.      atorvastatin (LIPITOR) 20 MG Tab Take 1 Tablet by mouth every evening. 100 Tablet 3    aspirin (ASA) 81 MG Chew Tab chewable tablet Chew 1 Tablet every day. 100 Tablet 3    Calcium 600 MG Tab Take 600 mg by mouth every day.      carvedilol (COREG) 25 MG Tab Take 2 Tablets by mouth 2 times a day. 360 Tablet 3    chlorthalidone (HYGROTON) 25 MG Tab Take 1 Tablet by mouth every morning. 100 Tablet 3    cloNIDine (CATAPRES) 0.1 MG Tab Take 1 Tablet by mouth 2 times a day. 200 Tablet 3    dilTIAZem (CARDIZEM) 120 MG Tab Take 1 Tablet by mouth 2 times a day. 200 Tablet 3    loratadine (CLARITIN) 10 MG Tab Take 1 Tablet by mouth every day. 100 Tablet 3    losartan (COZAAR) 100 MG Tab Take 1 Tablet by mouth every day. 100 Tablet 3    warfarin (COUMADIN) 5 MG Tab Follow directions per anticoagulation clinic. 5 mg (5 mg x 1) every Wed; 10 mg (5 mg x 2) all other days (Patient taking differently: 5 mg on Saturday, 10 mg all other days) 200  Tablet 3    Omega-3 Fatty Acids (OMEGA 3 PO) Take 1 Capsule by mouth every day.           Screening    Depression Screening    Little interest or pleasure in doing things?  0 - not at all  Feeling down, depressed , or hopeless? 0 - not at all  Trouble falling or staying asleep, or sleeping too much?     Feeling tired or having little energy?     Poor appetite or overeating?     Feeling bad about yourself - or that you are a failure or have let yourself or your family down?    Trouble concentrating on things, such as reading the newspaper or watching television?    Moving or speaking so slowly that other people could have noticed.  Or the opposite - being so fidgety or restless that you have been moving around a lot more than usual?     Thoughts that you would be better off dead, or of hurting yourself?     Patient Health Questionnaire Score:        If depressive symptoms identified deferred to follow up visit unless specifically addressed in assesment and plan.    Interpretation of PHQ-9 Total Score   Score Severity   1-4 No Depression   5-9 Mild Depression   10-14 Moderate Depression   15-19 Moderately Severe Depression   20-27 Severe Depression          ***          Physical exam    Vitals:    04/30/25 0945   BP: 124/60   Pulse: 72   Resp: 14   SpO2: 96%         ***      Lab Results  Lab Results   Component Value Date/Time    WBC 7.1 01/16/2025 09:44 PM    RBC 5.15 01/16/2025 09:44 PM    HEMOGLOBIN 15.8 01/16/2025 09:44 PM    HEMATOCRIT 47.2 (H) 01/16/2025 09:44 PM    MCV 91.7 01/16/2025 09:44 PM    MCH 30.7 01/16/2025 09:44 PM    MCHC 33.5 01/16/2025 09:44 PM    MPV 10.0 01/16/2025 09:44 PM    NEUTSPOLYS 56.00 01/16/2025 09:44 PM    LYMPHOCYTES 32.00 01/16/2025 09:44 PM    MONOCYTES 8.50 01/16/2025 09:44 PM    EOSINOPHILS 2.50 01/16/2025 09:44 PM    BASOPHILS 0.70 01/16/2025 09:44 PM          Lab Results   Component Value Date/Time    SODIUM 141 01/16/2025 09:44 PM    POTASSIUM 4.5 01/16/2025 09:44 PM    CHLORIDE  103 01/16/2025 09:44 PM    CO2 26 01/16/2025 09:44 PM    GLUCOSE 98 01/16/2025 09:44 PM    BUN 23 (H) 01/16/2025 09:44 PM    CREATININE 0.82 01/16/2025 09:44 PM            NEURO-DIAGNOSTICS  MRI brain wo contrast: 1/17/25  No acute process.     Age-related volume loss and chronic microvascular ischemic changes.     Remote pontine infarcts.     Small right petrous apex effusion.          CTA head and neck: 1/17/25  Normal CTA head.  1.  Approximately 50% stenosis of the distal right vertebral artery  2.  Pulmonary nodules, see nodule follow-up recommendations below.    Impression and Plan  Briefly, 74 y.o. female ***      ***      Numerous questions were answered to the best of my knowledge. The patient is in agreement with the plan. Return to the clinic ***    ADMINISTRATIVE BILLING  I spent a total of *** minutes on this patient encounter.        Luis Alberto Rudolph MD  Diplomate of the American Board of Psychiatry and Neurology.  General Neurology & Neuro-Oncology.  [facility].   of Clinical Neurology at [facility] of Medicine.

## 2025-05-02 ENCOUNTER — ANTICOAGULATION MONITORING (OUTPATIENT)
Dept: VASCULAR LAB | Facility: MEDICAL CENTER | Age: 75
End: 2025-05-02
Payer: MEDICARE

## 2025-05-02 DIAGNOSIS — Z79.01 LONG TERM (CURRENT) USE OF ANTICOAGULANTS: ICD-10-CM

## 2025-05-02 DIAGNOSIS — Z86.73 HISTORY OF ISCHEMIC STROKE: ICD-10-CM

## 2025-05-02 LAB — INR PPP: 2.6 (ref 2–3.5)

## 2025-05-02 NOTE — PROGRESS NOTES
Anticoagulation Summary  As of 2025      INR goal:  2.0-3.0   TTR:  85.4% (4.8 y)   INR used for dosin.60 (2025)   Warfarin maintenance plan:  5 mg (5 mg x 1) every Sat; 10 mg (5 mg x 2) all other days   Weekly warfarin total:  65 mg   Plan last modified:  Hyacinth Bliss A.P.NKimmy (7/15/2024)   Next INR check:  2025   Target end date:  Indefinite    Indications    AF (atrial fibrillation) (HCC) [I48.91]  Long term (current) use of anticoagulants [Z79.01] [Z79.01]  History of ischemic stroke [Z86.73]                 Anticoagulation Episode Summary       INR check location:  Anticoagulation Clinic    Preferred lab:  --    Send INR reminders to:  --    Comments:    Only call if out of range          Anticoagulation Care Providers       Provider Role Specialty Phone number    KRISTA Padilla.[email or web address].  Nurse Practitioner Family [phone]          Anticoagulation Patient Findings        Pt reported a therapeutic INR  Per chart review, pt does not want to be contacted if INR is therapeutic   Pt to continue with current warfarin dosing regimen. Requested pt contact the clinic for any s/s of unusual bleeding, bruising, clotting or any changes to diet or medication.    FU INR in 2 week(s).    Idris AyalaD

## 2025-05-16 ENCOUNTER — ANTICOAGULATION MONITORING (OUTPATIENT)
Dept: VASCULAR LAB | Facility: MEDICAL CENTER | Age: 75
End: 2025-05-16
Payer: MEDICARE

## 2025-05-16 DIAGNOSIS — Z86.73 HISTORY OF ISCHEMIC STROKE: ICD-10-CM

## 2025-05-16 DIAGNOSIS — I48.91 ATRIAL FIBRILLATION, UNSPECIFIED TYPE (HCC): Primary | ICD-10-CM

## 2025-05-16 DIAGNOSIS — Z79.01 LONG TERM (CURRENT) USE OF ANTICOAGULANTS: ICD-10-CM

## 2025-05-16 LAB — INR PPP: 2.3 (ref 2–3.5)

## 2025-05-16 NOTE — PROGRESS NOTES
Anticoagulation Summary  As of 2025      INR goal:  2.0-3.0   TTR:  85.5% (4.8 y)   INR used for dosin.30 (2025)   Warfarin maintenance plan:  5 mg (5 mg x 1) every Sat; 10 mg (5 mg x 2) all other days   Weekly warfarin total:  65 mg   Plan last modified:  Hyacinth Bliss A.P.NKimmy (7/15/2024)   Next INR check:  2025   Target end date:  Indefinite    Indications    AF (atrial fibrillation) (HCC) [I48.91]  Long term (current) use of anticoagulants [Z79.01] [Z79.01]  History of ischemic stroke [Z86.73]                 Anticoagulation Episode Summary       INR check location:  Anticoagulation Clinic    Preferred lab:  --    Send INR reminders to:  --    Comments:    Only call if out of range          Anticoagulation Care Providers       Provider Role Specialty Phone number    KRISTA Padilla.RKimmyN.  Nurse Practitioner Family [phone]            Refer to Anticoagulation Patient Findings for HPI    INR therapeutic at 2.3.      Per chart review, pt prefers no phone call if INR in range.    Pt to continue with current warfarin dosing regimen.     Will follow up in 2 week(s).     Renny Christina, PharmD, BCACP

## 2025-05-30 ENCOUNTER — ANTICOAGULATION MONITORING (OUTPATIENT)
Dept: VASCULAR LAB | Facility: MEDICAL CENTER | Age: 75
End: 2025-05-30
Payer: MEDICARE

## 2025-05-30 DIAGNOSIS — Z79.01 LONG TERM (CURRENT) USE OF ANTICOAGULANTS: Primary | ICD-10-CM

## 2025-05-30 DIAGNOSIS — Z86.73 HISTORY OF ISCHEMIC STROKE: ICD-10-CM

## 2025-05-30 LAB — INR PPP: 3 (ref 2–3.5)

## 2025-05-30 NOTE — PROGRESS NOTES
Anticoagulation Summary  As of 5/30/2025      INR goal:  2.0-3.0   TTR:  85.6% (4.9 y)   INR used for dosing:  3.00 (5/30/2025)   Warfarin maintenance plan:  5 mg (5 mg x 1) every Sat; 10 mg (5 mg x 2) all other days   Weekly warfarin total:  65 mg   Plan last modified:  Hyacinth Bliss A.P.NKimmy (7/15/2024)   Next INR check:  6/13/2025   Target end date:  Indefinite    Indications    AF (atrial fibrillation) (HCC) [I48.91]  Long term (current) use of anticoagulants [Z79.01] [Z79.01]  History of ischemic stroke [Z86.73]                 Anticoagulation Episode Summary       INR check location:  Anticoagulation Clinic    Preferred lab:  --    Send INR reminders to:  AMB ANTICOAG POOL    Comments:    Only call if out of range          Anticoagulation Care Providers       Provider Role Specialty Phone number    EVELIA Padilla.P.R.N.  Nurse Practitioner Family [phone]          Anticoagulation Patient Findings        Pt reported a therapeutic INR  Per chart review, pt does not want to be contacted if INR is therapeutic   Pt to continue with current warfarin dosing regimen. Requested pt contact the clinic for any s/s of unusual bleeding, bruising, clotting or any changes to diet or medication.    FU INR in 2 week(s).    Alyssa Caballero, IdrisD

## 2025-06-13 ENCOUNTER — ANTICOAGULATION MONITORING (OUTPATIENT)
Dept: MEDICAL GROUP | Facility: PHYSICIAN GROUP | Age: 75
End: 2025-06-13
Payer: MEDICARE

## 2025-06-13 DIAGNOSIS — Z79.01 LONG TERM (CURRENT) USE OF ANTICOAGULANTS: ICD-10-CM

## 2025-06-13 DIAGNOSIS — Z86.73 HISTORY OF ISCHEMIC STROKE: ICD-10-CM

## 2025-06-13 DIAGNOSIS — I48.91 ATRIAL FIBRILLATION, UNSPECIFIED TYPE (HCC): Primary | ICD-10-CM

## 2025-06-13 LAB — INR PPP: 2.6 (ref 2–3.5)

## 2025-06-13 NOTE — PROGRESS NOTES
Anticoagulation Summary  As of 2025      INR goal:  2.0-3.0   TTR:  85.7% (4.9 y)   INR used for dosin.60 (2025)   Warfarin maintenance plan:  5 mg (5 mg x 1) every Sat; 10 mg (5 mg x 2) all other days   Weekly warfarin total:  65 mg   Plan last modified:  Hyacinth Bliss A.P.N. (7/15/2024)   Next INR check:  2025   Target end date:  Indefinite    Indications    AF (atrial fibrillation) (HCC) [I48.91]  Long term (current) use of anticoagulants [Z79.01] [Z79.01]  History of ischemic stroke [Z86.73]                 Anticoagulation Episode Summary       INR check location:  Anticoagulation Clinic    Preferred lab:  --    Send INR reminders to:  AMB ANTICOAG POOL    Comments:    Only call if out of range          Anticoagulation Care Providers       Provider Role Specialty Phone number    Hyacinth Jones, EVELIA.P.R.N.  Nurse Practitioner Family [phone]          Anticoagulation Patient Findings    INR therapeutic at 2.6.  Per chart notes, patient requests contact only when out of range.  Pt is to continue with current warfarin dosing regimen.  Follow up in 2 weeks, to reduce risk of adverse events related to this high risk medication,  Warfarin.    Nestor Burch, PharmD, BCACP

## 2025-06-23 ASSESSMENT — ENCOUNTER SYMPTOMS
NIGHT SWEATS: 0
FEVER: 0
ABDOMINAL PAIN: 0
SHORTNESS OF BREATH: 0
WHEEZING: 0
FOCAL WEAKNESS: 0
PALPITATIONS: 0
WEAKNESS: 0
NAUSEA: 0
IRREGULAR HEARTBEAT: 0
DYSPNEA ON EXERTION: 0
DIARRHEA: 0
COUGH: 0
ORTHOPNEA: 0
VOMITING: 0
PND: 0
SYNCOPE: 0
NEAR-SYNCOPE: 0
DIZZINESS: 0

## 2025-06-24 ENCOUNTER — OFFICE VISIT (OUTPATIENT)
Dept: CARDIOLOGY | Facility: MEDICAL CENTER | Age: 75
End: 2025-06-24
Attending: STUDENT IN AN ORGANIZED HEALTH CARE EDUCATION/TRAINING PROGRAM
Payer: MEDICARE

## 2025-06-24 VITALS
RESPIRATION RATE: 16 BRPM | OXYGEN SATURATION: 93 % | SYSTOLIC BLOOD PRESSURE: 130 MMHG | HEIGHT: 65 IN | DIASTOLIC BLOOD PRESSURE: 72 MMHG | WEIGHT: 191 LBS | HEART RATE: 78 BPM | BODY MASS INDEX: 31.82 KG/M2

## 2025-06-24 DIAGNOSIS — I10 ESSENTIAL HYPERTENSION: ICD-10-CM

## 2025-06-24 DIAGNOSIS — E78.5 DYSLIPIDEMIA: ICD-10-CM

## 2025-06-24 DIAGNOSIS — I48.0 PAROXYSMAL ATRIAL FIBRILLATION (HCC): Primary | ICD-10-CM

## 2025-06-24 DIAGNOSIS — I48.0 HYPERCOAGULABLE STATE DUE TO PAROXYSMAL ATRIAL FIBRILLATION (HCC): ICD-10-CM

## 2025-06-24 DIAGNOSIS — D68.69 HYPERCOAGULABLE STATE DUE TO PAROXYSMAL ATRIAL FIBRILLATION (HCC): ICD-10-CM

## 2025-06-24 PROCEDURE — 99212 OFFICE O/P EST SF 10 MIN: CPT | Performed by: STUDENT IN AN ORGANIZED HEALTH CARE EDUCATION/TRAINING PROGRAM

## 2025-06-24 PROCEDURE — 3075F SYST BP GE 130 - 139MM HG: CPT | Performed by: STUDENT IN AN ORGANIZED HEALTH CARE EDUCATION/TRAINING PROGRAM

## 2025-06-24 PROCEDURE — 3078F DIAST BP <80 MM HG: CPT | Performed by: STUDENT IN AN ORGANIZED HEALTH CARE EDUCATION/TRAINING PROGRAM

## 2025-06-24 PROCEDURE — 99214 OFFICE O/P EST MOD 30 MIN: CPT | Performed by: STUDENT IN AN ORGANIZED HEALTH CARE EDUCATION/TRAINING PROGRAM

## 2025-06-24 ASSESSMENT — FIBROSIS 4 INDEX: FIB4 SCORE: 3.05

## 2025-06-24 NOTE — PROGRESS NOTES
Cardiology Follow-up Consultation Note    Date of note:    06/24/25  Primary Care Provider: MILO Padilla     Patient Name: Tonya Del Valle     YOB: 1950  MRN:              [id number]    Chief Complaint: Follow up    History of Present Illness: Ms. Tonya Del Valle is a 74 y.o. female whose current medical problems include afib on anticoagulation, dyslipidemia, HTN, and CVA who is here for follow up afib.     The patient was last seen in my clinic on 10/05/23. The patient then followed with [facility], last seen by NICOLE West, 1/31/2025. Atorvastatin was increased during the last visit. No other changes were made to her medications. There was discussion in terms of switching to DOAC - the patient opted to stay on warfarin.     The patient returns today for follow up. She presents with her .  She reports feeling well today. She has been doing some seated exercises at home without any problems.  She denies any chest pain or shortness of breath with exertion. No orthopnea/PND/leg swelling. No palpitations.  No syncope or presyncopal episodes.      Cardiovascular Risk Factors:  1. Smoking status: Never smoker  2. Type II Diabetes Mellitus: diet controlled  Lab Results   Component Value Date/Time    HBA1C 6.1 (H) 01/16/2025 09:44 PM    HBA1C 5.8 (H) 08/17/2024 07:32 AM     3. Hypertension: On medication  4. Dyslipidemia: On statin   Lab Results   Component Value Date/Time    CHOLSTRLTOT 143 01/17/2025 0026    TRIGLYCERIDE 88 01/17/2025 0026    HDL 49 01/17/2025 0026    LDL 76 01/17/2025 0026     5. Family history of early Coronary Artery Disease in a first degree relative (Male less than 55 years of age; Female less than 65 years of age): Mother with possible at age 58  6.  Obesity and/or Metabolic Syndrome: Body mass index is 31.78 kg/m².   7. Sedentary lifestyle: Not sedentary    Review of Systems   Constitutional: Negative for fever, malaise/fatigue and night sweats.  "  Cardiovascular:  Negative for chest pain, dyspnea on exertion, irregular heartbeat, leg swelling, near-syncope, orthopnea, palpitations, paroxysmal nocturnal dyspnea and syncope.   Respiratory:  Negative for cough, shortness of breath and wheezing.    Gastrointestinal:  Negative for abdominal pain, diarrhea, nausea and vomiting.   Neurological:  Negative for dizziness, focal weakness and weakness.       All other systems reviewed and are negative.         Current Outpatient Medications   Medication Sig Dispense Refill    VITAMIN D PO Take 3,000 mg by mouth every day.      atorvastatin (LIPITOR) 20 MG Tab Take 1 Tablet by mouth every evening. 100 Tablet 3    aspirin (ASA) 81 MG Chew Tab chewable tablet Chew 1 Tablet every day. 100 Tablet 3    Calcium 600 MG Tab Take 600 mg by mouth every day.      carvedilol (COREG) 25 MG Tab Take 2 Tablets by mouth 2 times a day. 360 Tablet 3    chlorthalidone (HYGROTON) 25 MG Tab Take 1 Tablet by mouth every morning. 100 Tablet 3    cloNIDine (CATAPRES) 0.1 MG Tab Take 1 Tablet by mouth 2 times a day. 200 Tablet 3    dilTIAZem (CARDIZEM) 120 MG Tab Take 1 Tablet by mouth 2 times a day. 200 Tablet 3    loratadine (CLARITIN) 10 MG Tab Take 1 Tablet by mouth every day. 100 Tablet 3    losartan (COZAAR) 100 MG Tab Take 1 Tablet by mouth every day. 100 Tablet 3    warfarin (COUMADIN) 5 MG Tab Follow directions per anticoagulation clinic. 5 mg (5 mg x 1) every Wed; 10 mg (5 mg x 2) all other days (Patient taking differently: 5 mg on Saturday, 10 mg all other days) 200 Tablet 3    Omega-3 Fatty Acids (OMEGA 3 PO) Take 1 Capsule by mouth every day.       No current facility-administered medications for this visit.         Allergies   Allergen Reactions    Sulfa Drugs Rash and Itching             Physical Exam:  Ambulatory Vitals  /72 (BP Location: Left arm, Patient Position: Sitting, BP Cuff Size: Large adult)   Pulse 78   Resp 16   Ht 1.651 m (5' 5\")   Wt 86.6 kg (191 lb)   " SpO2 93%    Oxygen Therapy:  Pulse Oximetry: 93 %  BP Readings from Last 4 Encounters:   06/24/25 130/72   04/30/25 124/60   03/26/25 124/72   02/10/25 130/70       Weight/BMI: Body mass index is 31.78 kg/m².  Wt Readings from Last 4 Encounters:   06/24/25 86.6 kg (191 lb)   04/30/25 89.2 kg (196 lb 10.4 oz)   03/26/25 88.9 kg (196 lb)   02/10/25 88.5 kg (195 lb)       General: Well appearing and in no apparent distress  Eyes: nl conjunctiva, no icteric sclera  ENT:  normal external appearance of ears, nose, and throat  Neck: no visible JVP,  no carotid bruits  Lungs: normal respiratory effort, CTAB  Heart: RRR, no murmurs, no rubs or gallops,  no edema bilateral lower extremities. No LV/RV heave on cardiac palpatation. + bilateral radial pulses.  + bilateral dp pulses.   Abdomen: soft, non tender, non distended, no masses, normal bowel sounds.  No HSM.  Extremities/MSK: no clubbing, no cyanosis  Neurological: No focal sensory deficits  Psychiatric: Appropriate affect, A/O x 3, intact judgement and insight  Skin: Warm extremities      Lab Data Review:  Lab Results   Component Value Date/Time    CHOLSTRLTOT 143 01/17/2025 12:26 AM    LDL 76 01/17/2025 12:26 AM    HDL 49 01/17/2025 12:26 AM    TRIGLYCERIDE 88 01/17/2025 12:26 AM       Lab Results   Component Value Date/Time    SODIUM 141 01/16/2025 09:44 PM    POTASSIUM 4.5 01/16/2025 09:44 PM    CHLORIDE 103 01/16/2025 09:44 PM    CO2 26 01/16/2025 09:44 PM    GLUCOSE 98 01/16/2025 09:44 PM    BUN 23 (H) 01/16/2025 09:44 PM    CREATININE 0.82 01/16/2025 09:44 PM     Lab Results   Component Value Date/Time    ALKPHOSPHAT 62 01/16/2025 09:44 PM    ASTSGOT 38 01/16/2025 09:44 PM    ALTSGPT 17 01/16/2025 09:44 PM    TBILIRUBIN 0.7 01/16/2025 09:44 PM      Lab Results   Component Value Date/Time    WBC 7.1 01/16/2025 09:44 PM    HEMOGLOBIN 15.8 01/16/2025 09:44 PM     Lab Results   Component Value Date/Time    HBA1C 6.1 (H) 01/16/2025 09:44 PM    HBA1C 5.8 (H) 08/17/2024  07:32 AM         Cardiac Imaging and Procedures Review:    EKG 10/5/2023: My personal interpretation is sinus rhythm, prolonged MS interval, old anterior infarct.     Echo dated 1/17/2025  CONCLUSIONS  Compared to the prior study on 10/27/23, no changes.   Normal left ventricular systolic function.   Aneurysmal interatrial septum. Agitated saline (bubble) study was   performed with and without Valsalva maneuver. No evidence of right to   left shunt by agitated saline study.  No evidence of valvular abnormality based on Doppler evaluation.     Echocardiogram 7/2019 - normal as per prior cardiologist note      Assessment & Plan     1. Paroxysmal atrial fibrillation (HCC)        2. Hypercoagulable state due to paroxysmal atrial fibrillation (HCC)        3. Essential hypertension        4. Dyslipidemia              Shared Medical Decision Making:    Paroxysmal afib  Hypercoagulable state due to AF  Asymptomatic. Currently in NSR. [id number] 5 (age, female, HTN, CVAx2).    -Continue diltiazem 120mg twice daily and carvedilol 50mg tiwce daily.   -Continue warfarin (OK to switch to DOAC if recommended by neurology/ophthalmology)     HTN  BP within normal limits  -Continue losartan 100mg daily, clonidine 0.1mg twice daily, chlorthalidone 25 mg daily  -Continue diltiazem and carvedilol as above    Dyslipidemia  -Continue atorvastatin 20mg daily  -Counseled on heart healthy diet and exercise.     All of the patient's excellent questions were answered to the best of my knowledge and to her satisfaction.  It was a pleasure seeing Ms. Tonya Del Valle in my clinic today. Return in about 1 year (around 6/24/2026), or if symptoms worsen or fail to improve. Patient is aware to call the cardiology clinic with any questions or concerns.      Isabella Mcleod MD  [facility]  [facility], [address]  [address] [address]  Phone: [phone]  Fax: [phone]

## 2025-07-01 ENCOUNTER — OFFICE VISIT (OUTPATIENT)
Dept: MEDICAL GROUP | Facility: PHYSICIAN GROUP | Age: 75
End: 2025-07-01
Payer: MEDICARE

## 2025-07-01 VITALS
RESPIRATION RATE: 16 BRPM | WEIGHT: 198 LBS | BODY MASS INDEX: 32.99 KG/M2 | SYSTOLIC BLOOD PRESSURE: 128 MMHG | DIASTOLIC BLOOD PRESSURE: 62 MMHG | HEART RATE: 69 BPM | HEIGHT: 65 IN | OXYGEN SATURATION: 94 % | TEMPERATURE: 98.6 F

## 2025-07-01 DIAGNOSIS — I10 HYPERTENSION, UNSPECIFIED TYPE: Primary | ICD-10-CM

## 2025-07-01 DIAGNOSIS — I48.91 ATRIAL FIBRILLATION, UNSPECIFIED TYPE (HCC): ICD-10-CM

## 2025-07-01 DIAGNOSIS — E78.5 HYPERLIPIDEMIA, UNSPECIFIED HYPERLIPIDEMIA TYPE: ICD-10-CM

## 2025-07-01 DIAGNOSIS — H34.12 CENTRAL RETINAL ARTERY OCCLUSION OF LEFT EYE: ICD-10-CM

## 2025-07-01 PROCEDURE — 3078F DIAST BP <80 MM HG: CPT

## 2025-07-01 PROCEDURE — 3074F SYST BP LT 130 MM HG: CPT

## 2025-07-01 PROCEDURE — 99214 OFFICE O/P EST MOD 30 MIN: CPT

## 2025-07-01 ASSESSMENT — ENCOUNTER SYMPTOMS: FALLS: 0

## 2025-07-01 ASSESSMENT — FIBROSIS 4 INDEX: FIB4 SCORE: 3.05

## 2025-07-01 NOTE — PROGRESS NOTES
Verbal consent was acquired by the patient to use Text[employer] ambient listening note generation during this visit     Subjective:     CC: The primary encounter diagnosis was Hypertension, unspecified type. Diagnoses of Atrial fibrillation, unspecified type (HCC), Hyperlipidemia, unspecified hyperlipidemia type, and Central retinal artery occlusion of left eye were also pertinent to this visit.    HPI:   Tonya presents today with    History of Present Illness  The patient presents for evaluation of leg edema, left eye fluid, blood pressure management, and diabetes.    Leg Edema  - She reports leg edema and considers using chlorthalidone for a day or two to alleviate swelling, intending to taper off once the swelling subsides.    Left Eye Fluid  - A retina specialist identified increased fluid behind her left eye.  - She initially denied steroid use but later recalled using Olay body lotion with collagen peptides and vitamin E cream, suspecting they might contain steroids.  - The specialist advised that the fluid often resolves on its own but may require needle drainage if persistent.  - Follow-up in 3 months was recommended.  - She also mentions occasional double vision and a shadow in one area of her eye.    Blood Pressure Management  - Her cardiologist expressed no preference for a specific blood thinner as long as she remains on one, recommending annual visits due to her stable condition.  - She engages in simple chair exercises and was advised to continue them.  - The cardiologist requested information from the retina specialist regarding the most suitable blood thinner.  - She plans to discuss this with the retina specialist during her next visit.  - Her neurologist suggested switching to a different blood thinner but did not follow up.  - She is currently on Coumadin and baby aspirin, which she tolerates well.  - She monitors her blood levels every 2 weeks at home, with the most recent reading being  "2.6.  - Over the past 5 years, she has had 4 or 5 instances of out-of-range levels.  - She enjoys high vitamin K vegetables but limits intake.  - She feels stable on her current regimen.  - She recalls an incident where Coumadin failed, leading to a stroke, admitting to irregular intake times.    Diabetes  - She is on metformin extended release, taken after meals.  - She is curious about timing and whether it would be beneficial to take it immediately after consuming sweets.  - Her A1c levels have never exceeded 6.6, with the highest being 6.4, prompting metformin therapy.  - For several years, her A1c levels were maintained at 5.8.    Current Medications[1]    Problem   Vision loss, left eye   Htn (Hypertension)   Hyperlipidemia   Af (Atrial Fibrillation) (Hcc)       ROS: negative except for as documented below, in HPI, Assessment and Plan  Review of Systems   Musculoskeletal:  Negative for falls.   All other systems reviewed and are negative.      Objective:     Exam:  /62 (BP Location: Right arm, Patient Position: Sitting)   Pulse 69   Temp 37 °C (98.6 °F) (Temporal)   Resp 16   Ht 1.651 m (5' 5\")   Wt 89.8 kg (198 lb)   SpO2 94%   BMI 32.95 kg/m²  Body mass index is 32.95 kg/m².    Physical Exam  Vitals reviewed.   Constitutional:       General: She is not in acute distress.     Appearance: Normal appearance. She is obese. She is not ill-appearing.   HENT:      Head: Normocephalic and atraumatic.   Cardiovascular:      Rate and Rhythm: Normal rate and regular rhythm.      Pulses: Normal pulses.      Heart sounds: Murmur heard.   Pulmonary:      Effort: Pulmonary effort is normal. No respiratory distress.      Breath sounds: Normal breath sounds.   Abdominal:      General: Bowel sounds are normal.      Palpations: Abdomen is soft.   Skin:     General: Skin is warm and dry.      Findings: No rash.   Neurological:      General: No focal deficit present.      Mental Status: She is alert and oriented to " person, place, and time.      Gait: Gait abnormal (walker).   Psychiatric:         Mood and Affect: Mood normal.         Behavior: Behavior normal.         Assessment & Plan:     74 y.o. female with the following -     Assessment & Plan  1. Edema: Chronic.  - Likely due to fluid leakage from weakened blood vessels, exacerbated by high-salt diet. Significant leg swelling noted.  - Discussed chlorthalidone use as needed and monitoring potassium levels.  - Prescription for chlorthalidone sent to pharmacy. Labs and potassium levels to be reevaluated in a couple of weeks.    2. Left eye fluid accumulation.  - Fluid behind left eye noted by retina specialist. No evidence of steroid use in current topical treatments.  - Needle drainage if fluid persists.    3. Blood pressure management.  - Blood pressure readings within normal range. Current regimen of Coumadin and baby aspirin well-tolerated.  - Discussed stability and effectiveness of current regimen.  - Continue current regimen.    4. Diabetes mellitus.  - A1c levels stable and within target range. Current regimen of metformin extended release effective.  - Discussed timing of metformin and dietary considerations.  - Continue current regimen and maintain consistent diet. Provided information about American Diabetes Association and TCOYD for further education.    Follow-up  - Follow-up with retina specialist in 3 months.    Problem List Items Addressed This Visit       AF (atrial fibrillation) (HCC)    Chronic, stable. Continue diltiazem 120 mg BID, aspirin 81 mg daily, coumadin to keep PT/INR between 2-3         HTN (hypertension) - Primary    Chronic, stable. Bp in clinic today 128/62. Continue losartan 100 mg daily, clonidine 0.1 mg BID, chlorthalidone 25 mg daily, coreg 50 mg BID         Hyperlipidemia    Chronic, stable. Continue atorvastatin 20 mg nightly         Vision loss, left eye    Chronic, stable. Seeing ophthalmology every 3 months. Reports return of vision  mostly to left eye          Patient was educated in proper administration of medication(s) ordered today including safety, possible SE, risks, benefits, rationale and alternatives to therapy.   Supportive care, differential diagnoses, and indications for immediate follow-up discussed with patient.    Pathogenesis of diagnosis discussed including typical length and natural progression.    Instructed to return to clinic or nearest emergency department for any change in condition, further concerns, or worsening of symptoms.  Patient states understanding of the plan of care and discharge instructions.    Return in about 3 months (around 10/1/2025), or if symptoms worsen or fail to improve.    Please note that this dictation was created using voice recognition software. I have made every reasonable attempt to correct obvious errors, but I expect that there are errors of grammar and possibly content that I did not discover before finalizing the note.             [1]   Current Outpatient Medications   Medication Sig Dispense Refill    VITAMIN D PO Take 3,000 mg by mouth every day.      atorvastatin (LIPITOR) 20 MG Tab Take 1 Tablet by mouth every evening. 100 Tablet 3    aspirin (ASA) 81 MG Chew Tab chewable tablet Chew 1 Tablet every day. 100 Tablet 3    Calcium 600 MG Tab Take 600 mg by mouth every day.      carvedilol (COREG) 25 MG Tab Take 2 Tablets by mouth 2 times a day. 360 Tablet 3    chlorthalidone (HYGROTON) 25 MG Tab Take 1 Tablet by mouth every morning. 100 Tablet 3    cloNIDine (CATAPRES) 0.1 MG Tab Take 1 Tablet by mouth 2 times a day. 200 Tablet 3    dilTIAZem (CARDIZEM) 120 MG Tab Take 1 Tablet by mouth 2 times a day. 200 Tablet 3    loratadine (CLARITIN) 10 MG Tab Take 1 Tablet by mouth every day. 100 Tablet 3    losartan (COZAAR) 100 MG Tab Take 1 Tablet by mouth every day. 100 Tablet 3    warfarin (COUMADIN) 5 MG Tab Follow directions per anticoagulation clinic. 5 mg (5 mg x 1) every Wed; 10 mg (5 mg x  2) all other days (Patient taking differently: 5 mg on Saturday, 10 mg all other days) 200 Tablet 3    Omega-3 Fatty Acids (OMEGA 3 PO) Take 1 Capsule by mouth every day.       No current facility-administered medications for this visit.

## 2025-07-01 NOTE — ASSESSMENT & PLAN NOTE
Chronic, stable. Bp in clinic today 128/62. Continue losartan 100 mg daily, clonidine 0.1 mg BID, chlorthalidone 25 mg daily, coreg 50 mg BID

## 2025-07-01 NOTE — ASSESSMENT & PLAN NOTE
Chronic, stable. Continue diltiazem 120 mg BID, aspirin 81 mg daily, coumadin to keep PT/INR between 2-3

## 2025-07-04 LAB — INR PPP: 2.4 (ref 2–3.5)

## 2025-07-07 ENCOUNTER — ANTICOAGULATION MONITORING (OUTPATIENT)
Dept: VASCULAR LAB | Facility: MEDICAL CENTER | Age: 75
End: 2025-07-07
Payer: MEDICARE

## 2025-07-07 DIAGNOSIS — I48.91 ATRIAL FIBRILLATION, UNSPECIFIED TYPE (HCC): Primary | ICD-10-CM

## 2025-07-07 DIAGNOSIS — Z79.01 LONG TERM (CURRENT) USE OF ANTICOAGULANTS: ICD-10-CM

## 2025-07-07 DIAGNOSIS — Z86.73 HISTORY OF ISCHEMIC STROKE: ICD-10-CM

## 2025-07-07 NOTE — PROGRESS NOTES
Anticoagulation Summary  As of 2025      INR goal:  2.0-3.0   TTR:  85.9% (5 y)   INR used for dosin.40 (2025)   Warfarin maintenance plan:  5 mg (5 mg x 1) every Sat; 10 mg (5 mg x 2) all other days   Weekly warfarin total:  65 mg   Plan last modified:  Hyacinth Bliss A.P.NKimmy (7/15/2024)   Next INR check:  2025   Target end date:  Indefinite    Indications    AF (atrial fibrillation) (HCC) [I48.91]  Long term (current) use of anticoagulants [Z79.01] [Z79.01]  History of ischemic stroke [Z86.73]                 Anticoagulation Episode Summary       INR check location:  Anticoagulation Clinic    Preferred lab:  --    Send INR reminders to:  AMB ANTICOAG POOL    Comments:    Only call if out of range          Anticoagulation Care Providers       Provider Role Specialty Phone number    EVELIA Padilla.P.R.N.  Nurse Practitioner Family [phone]          Anticoagulation Patient Findings        Pt reported a therapeutic INR  Per chart review, pt does not want to be contacted if INR is therapeutic   Pt to continue with current warfarin dosing regimen. Requested pt contact the clinic for any s/s of unusual bleeding, bruising, clotting or any changes to diet or medication.    FU INR in 2 week(s).    Jolanta Barnett, PharmD

## 2025-07-17 NOTE — PROGRESS NOTES
Anticoagulation Summary  As of 2022    INR goal:  2.0-3.0   TTR:  82.5 % (1.7 y)   INR used for dosin.30 (2022)   Warfarin maintenance plan:  5 mg (5 mg x 1) every Wed; 10 mg (5 mg x 2) all other days   Weekly warfarin total:  65 mg   Plan last modified:  Vicky Callahan (2022)   Next INR check:  2022   Target end date:  Indefinite    Indications    Paroxysmal atrial fibrillation (HCC) [I48.0]  Long term (current) use of anticoagulants [Z79.01] [Z79.01]  Ischemic cerebrovascular accident (CVA) (HCC) [I63.9]             Anticoagulation Episode Summary     INR check location:  Anticoagulation Clinic    Preferred lab:      Send INR reminders to:      Comments:        Anticoagulation Care Providers     Provider Role Specialty Phone number    MILO Padilla  Nurse Practitioner Family [phone]        Anticoagulation Patient Findings    Spoke to the patient's  Allen on the phone. Patient denies any signs of bleeding or bruising. Patient's INR is therapeutic at 2.3. No recent changes in diet or medications. Patient instructed to continue the current warfarin dosing as shown above. Follow-up in 2 week(s).    Maddison Martin, [email or web address].D, BC-ACP, BC-ADM           
Where Is Your Acne Located?: Face
Please List The Treatments That Have Worked Best For Your Acne: (Separate Each Entry With A Comma): Tretinoin

## 2025-07-18 ENCOUNTER — ANTICOAGULATION MONITORING (OUTPATIENT)
Dept: VASCULAR LAB | Facility: MEDICAL CENTER | Age: 75
End: 2025-07-18
Payer: MEDICARE

## 2025-07-18 DIAGNOSIS — Z79.01 LONG TERM (CURRENT) USE OF ANTICOAGULANTS: ICD-10-CM

## 2025-07-18 DIAGNOSIS — Z86.73 HISTORY OF ISCHEMIC STROKE: ICD-10-CM

## 2025-07-18 DIAGNOSIS — I48.91 ATRIAL FIBRILLATION, UNSPECIFIED TYPE (HCC): Primary | ICD-10-CM

## 2025-07-18 LAB — INR PPP: 2.1 (ref 2–3.5)

## 2025-07-18 NOTE — PROGRESS NOTES
Anticoagulation Summary  As of 2025      INR goal:  2.0-3.0   TTR:  86.0% (5 y)   INR used for dosin.10 (2025)   Warfarin maintenance plan:  5 mg (5 mg x 1) every Sat; 10 mg (5 mg x 2) all other days   Weekly warfarin total:  65 mg   Plan last modified:  Hyacinth Bliss A.P.N. (7/15/2024)   Next INR check:  2025   Target end date:  Indefinite    Indications    AF (atrial fibrillation) (HCC) [I48.91]  Long term (current) use of anticoagulants [Z79.01] [Z79.01]  History of ischemic stroke [Z86.73]                 Anticoagulation Episode Summary       INR check location:  Anticoagulation Clinic    Preferred lab:  --    Send INR reminders to:  AMB ANTICOAG POOL    Comments:    Only call if out of range          Anticoagulation Care Providers       Provider Role Specialty Phone number    EVELIA [email or web address].  Nurse Practitioner Family [phone]          Anticoagulation Patient Findings        INR is therapeutic  Reason(s) for out of range INR today: N/A      Per chart review, patient prefers no call is INR is in range.     Pt is on antiplatelet therapy with ASA for stroke in the eye.      Warfarin Plan: Continue regimen as listed above.    Next INR in 2 week(s).    Keily Rice, PharmD

## 2025-08-01 ENCOUNTER — ANTICOAGULATION MONITORING (OUTPATIENT)
Dept: VASCULAR LAB | Facility: MEDICAL CENTER | Age: 75
End: 2025-08-01
Payer: MEDICARE

## 2025-08-01 DIAGNOSIS — Z86.73 HISTORY OF ISCHEMIC STROKE: ICD-10-CM

## 2025-08-01 DIAGNOSIS — Z79.01 LONG TERM (CURRENT) USE OF ANTICOAGULANTS: Primary | ICD-10-CM

## 2025-08-01 LAB — INR PPP: 2.5 (ref 2–3.5)

## 2025-08-15 ENCOUNTER — ANTICOAGULATION MONITORING (OUTPATIENT)
Dept: VASCULAR LAB | Facility: MEDICAL CENTER | Age: 75
End: 2025-08-15
Payer: MEDICARE

## 2025-08-15 DIAGNOSIS — Z86.73 HISTORY OF ISCHEMIC STROKE: ICD-10-CM

## 2025-08-15 DIAGNOSIS — Z79.01 LONG TERM (CURRENT) USE OF ANTICOAGULANTS: Primary | ICD-10-CM

## 2025-08-15 LAB — INR PPP: 2.7 (ref 2–3.5)

## 2025-08-29 ENCOUNTER — ANTICOAGULATION MONITORING (OUTPATIENT)
Dept: VASCULAR LAB | Facility: MEDICAL CENTER | Age: 75
End: 2025-08-29
Payer: MEDICARE

## 2025-08-29 DIAGNOSIS — Z79.01 LONG TERM (CURRENT) USE OF ANTICOAGULANTS: ICD-10-CM

## 2025-08-29 DIAGNOSIS — Z86.73 HISTORY OF ISCHEMIC STROKE: ICD-10-CM

## 2025-08-29 DIAGNOSIS — I48.91 ATRIAL FIBRILLATION, UNSPECIFIED TYPE (HCC): Primary | ICD-10-CM

## 2025-08-29 LAB — INR PPP: 2.6 (ref 2–3.5)
